# Patient Record
Sex: FEMALE | Race: WHITE | NOT HISPANIC OR LATINO | Employment: PART TIME | URBAN - METROPOLITAN AREA
[De-identification: names, ages, dates, MRNs, and addresses within clinical notes are randomized per-mention and may not be internally consistent; named-entity substitution may affect disease eponyms.]

---

## 2017-02-21 ENCOUNTER — TELEPHONE (OUTPATIENT)
Dept: FAMILY MEDICINE | Facility: CLINIC | Age: 62
End: 2017-02-21

## 2017-02-21 NOTE — TELEPHONE ENCOUNTER
Panel Management Review      Patient has the following on her problem list:     Depression / Dysthymia review  PHQ-9 SCORE 5/11/2015 1/15/2016 10/13/2016   Total Score 1 - -   Total Score - 18 2      Patient is due for:  None    Hypertension   Last three blood pressure readings:  BP Readings from Last 3 Encounters:   11/10/16 134/86   06/10/16 101/47   05/23/16 126/86     Blood pressure: Passed    HTN Guidelines:  Age 18-59 BP range:  Less than 140/90  Age 60-85 with Diabetes:  Less than 140/90  Age 60-85 without Diabetes:  less than 150/90      Composite cancer screening  Chart review shows that this patient is due/due soon for the following Fecal Colorectal (FIT)  Summary:    Patient is due/failing the following:   DAP and FIT    Action needed:   Patient needs to do the colon cancer screening(FIT Test). Looks like she did the FIT test last year.  So if she does not have the FIT Test she can pick one up at the lab.     Type of outreach:    Phone, left message for patient to call back.     Questions for provider review:    None                                                                                                                                    Lisa Magill, CMA       Chart routed to Care Team .

## 2017-02-24 NOTE — TELEPHONE ENCOUNTER
Patient has moved, she no longer in this area. She will follow up with new doctor in her area. Sherley Luna

## 2017-04-18 DIAGNOSIS — F41.9 ANXIETY: ICD-10-CM

## 2017-04-18 RX ORDER — BUPROPION HYDROCHLORIDE 150 MG/1
TABLET ORAL
Qty: 30 TABLET | Refills: 0 | Status: SHIPPED | OUTPATIENT
Start: 2017-04-18 | End: 2017-05-20

## 2017-04-18 NOTE — TELEPHONE ENCOUNTER
Bupropion       Last Written Prescription Date: 11/10/16  Last Fill Quantity: 90; # refills: 1  Last Office Visit with [facility], [facility] or [facility] prescribing provider:  11/10/16        Last PHQ-9 score on record=   PHQ-9 SCORE 10/13/2016   Total Score -   Total Score 2       Lab Results   Component Value Date    AST 16 05/23/2016     Lab Results   Component Value Date    ALT 21 05/23/2016

## 2017-04-18 NOTE — TELEPHONE ENCOUNTER
buPROPion (WELLBUTRIN XL) 150 MG 24 hr tablet       Last Written Prescription Date: 11/10/16  Last Fill Quantity: 90; # refills: 1  Last Office Visit with [facility], [facility] or [facility] prescribing provider:  11/10/16        Last PHQ-9 score on record=   PHQ-9 SCORE 10/13/2016   Total Score -   Total Score 2       Lab Results   Component Value Date    AST 16 05/23/2016     Lab Results   Component Value Date    ALT 21 05/23/2016

## 2017-04-18 NOTE — TELEPHONE ENCOUNTER
Prescription approved per [facility] Refill Protocol.    Margie FRAGA RN

## 2017-04-21 ENCOUNTER — OFFICE VISIT (OUTPATIENT)
Dept: FAMILY MEDICINE | Facility: CLINIC | Age: 62
End: 2017-04-21
Payer: COMMERCIAL

## 2017-04-21 VITALS
WEIGHT: 264 LBS | BODY MASS INDEX: 40.01 KG/M2 | TEMPERATURE: 98.2 F | DIASTOLIC BLOOD PRESSURE: 74 MMHG | RESPIRATION RATE: 20 BRPM | HEIGHT: 68 IN | HEART RATE: 76 BPM | SYSTOLIC BLOOD PRESSURE: 128 MMHG

## 2017-04-21 DIAGNOSIS — M81.0 OSTEOPOROSIS: ICD-10-CM

## 2017-04-21 DIAGNOSIS — Z13.29 SCREENING FOR HYPOTHYROIDISM: ICD-10-CM

## 2017-04-21 DIAGNOSIS — E66.01 MORBID OBESITY DUE TO EXCESS CALORIES (H): ICD-10-CM

## 2017-04-21 DIAGNOSIS — Z13.220 SCREENING FOR HYPERLIPIDEMIA: ICD-10-CM

## 2017-04-21 DIAGNOSIS — F32.0 MILD SINGLE CURRENT EPISODE OF MAJOR DEPRESSIVE DISORDER (H): ICD-10-CM

## 2017-04-21 DIAGNOSIS — Z12.11 COLON CANCER SCREENING: ICD-10-CM

## 2017-04-21 DIAGNOSIS — I10 BENIGN ESSENTIAL HYPERTENSION: ICD-10-CM

## 2017-04-21 DIAGNOSIS — Z00.00 ENCOUNTER FOR HEALTH MAINTENANCE EXAMINATION IN ADULT: Primary | ICD-10-CM

## 2017-04-21 DIAGNOSIS — Z96.653 STATUS POST TOTAL BILATERAL KNEE REPLACEMENT: ICD-10-CM

## 2017-04-21 LAB
ALBUMIN SERPL-MCNC: 3.8 G/DL (ref 3.4–5)
ALP SERPL-CCNC: 114 U/L (ref 40–150)
ALT SERPL W P-5'-P-CCNC: 16 U/L (ref 0–50)
ANION GAP SERPL CALCULATED.3IONS-SCNC: 9 MMOL/L (ref 3–14)
AST SERPL W P-5'-P-CCNC: 14 U/L (ref 0–45)
BASOPHILS # BLD AUTO: 0 10E9/L (ref 0–0.2)
BASOPHILS NFR BLD AUTO: 0.3 %
BILIRUB SERPL-MCNC: 0.7 MG/DL (ref 0.2–1.3)
BUN SERPL-MCNC: 17 MG/DL (ref 7–30)
CALCIUM SERPL-MCNC: 8.9 MG/DL (ref 8.5–10.1)
CHLORIDE SERPL-SCNC: 105 MMOL/L (ref 94–109)
CHOLEST SERPL-MCNC: 173 MG/DL
CO2 SERPL-SCNC: 25 MMOL/L (ref 20–32)
CREAT SERPL-MCNC: 0.81 MG/DL (ref 0.52–1.04)
DIFFERENTIAL METHOD BLD: NORMAL
EOSINOPHIL # BLD AUTO: 0.1 10E9/L (ref 0–0.7)
EOSINOPHIL NFR BLD AUTO: 1.4 %
ERYTHROCYTE [DISTWIDTH] IN BLOOD BY AUTOMATED COUNT: 14.1 % (ref 10–15)
GFR SERPL CREATININE-BSD FRML MDRD: 72 ML/MIN/1.7M2
GLUCOSE SERPL-MCNC: 81 MG/DL (ref 70–99)
HCT VFR BLD AUTO: 46 % (ref 35–47)
HDLC SERPL-MCNC: 73 MG/DL
HGB BLD-MCNC: 15.1 G/DL (ref 11.7–15.7)
LDLC SERPL CALC-MCNC: 85 MG/DL
LYMPHOCYTES # BLD AUTO: 1.6 10E9/L (ref 0.8–5.3)
LYMPHOCYTES NFR BLD AUTO: 26.3 %
MCH RBC QN AUTO: 29.4 PG (ref 26.5–33)
MCHC RBC AUTO-ENTMCNC: 32.8 G/DL (ref 31.5–36.5)
MCV RBC AUTO: 90 FL (ref 78–100)
MONOCYTES # BLD AUTO: 0.6 10E9/L (ref 0–1.3)
MONOCYTES NFR BLD AUTO: 9.1 %
NEUTROPHILS # BLD AUTO: 3.9 10E9/L (ref 1.6–8.3)
NEUTROPHILS NFR BLD AUTO: 62.9 %
NONHDLC SERPL-MCNC: 100 MG/DL
PLATELET # BLD AUTO: 212 10E9/L (ref 150–450)
POTASSIUM SERPL-SCNC: 4.2 MMOL/L (ref 3.4–5.3)
PROT SERPL-MCNC: 6.9 G/DL (ref 6.8–8.8)
RBC # BLD AUTO: 5.14 10E12/L (ref 3.8–5.2)
SODIUM SERPL-SCNC: 139 MMOL/L (ref 133–144)
TRIGL SERPL-MCNC: 76 MG/DL
TSH SERPL DL<=0.005 MIU/L-ACNC: 0.74 MU/L (ref 0.4–4)
WBC # BLD AUTO: 6.2 10E9/L (ref 4–11)

## 2017-04-21 PROCEDURE — 84443 ASSAY THYROID STIM HORMONE: CPT | Performed by: NURSE PRACTITIONER

## 2017-04-21 PROCEDURE — 80061 LIPID PANEL: CPT | Performed by: NURSE PRACTITIONER

## 2017-04-21 PROCEDURE — 85025 COMPLETE CBC W/AUTO DIFF WBC: CPT | Performed by: NURSE PRACTITIONER

## 2017-04-21 PROCEDURE — 80053 COMPREHEN METABOLIC PANEL: CPT | Performed by: NURSE PRACTITIONER

## 2017-04-21 PROCEDURE — 36415 COLL VENOUS BLD VENIPUNCTURE: CPT | Performed by: NURSE PRACTITIONER

## 2017-04-21 PROCEDURE — 99396 PREV VISIT EST AGE 40-64: CPT | Performed by: NURSE PRACTITIONER

## 2017-04-21 ASSESSMENT — ANXIETY QUESTIONNAIRES
7. FEELING AFRAID AS IF SOMETHING AWFUL MIGHT HAPPEN: NOT AT ALL
3. WORRYING TOO MUCH ABOUT DIFFERENT THINGS: SEVERAL DAYS
GAD7 TOTAL SCORE: 4
1. FEELING NERVOUS, ANXIOUS, OR ON EDGE: SEVERAL DAYS
2. NOT BEING ABLE TO STOP OR CONTROL WORRYING: SEVERAL DAYS
5. BEING SO RESTLESS THAT IT IS HARD TO SIT STILL: NOT AT ALL
6. BECOMING EASILY ANNOYED OR IRRITABLE: NOT AT ALL

## 2017-04-21 ASSESSMENT — PATIENT HEALTH QUESTIONNAIRE - PHQ9: 5. POOR APPETITE OR OVEREATING: SEVERAL DAYS

## 2017-04-21 NOTE — LETTER
My Depression Action Plan  Name: Donita Lerner   Date of Birth 1955  Date: 4/21/2017    My doctor: Daya Bruno   My clinic: [facility]  [address] [facility] [address]  [phone]          GREEN    ZONE   Good Control    What it looks like:     Things are going generally well. You have normal up s and down s. You may even feel depressed from time to time, but bad moods usually last less than a day.   What you need to do:  1. Continue to care for yourself (see self care plan)  2. Check your depression survival kit and update it as needed  3. Follow your physician s recommendations including any medication.  4. Do not stop taking medication unless you consult with your physician first.           YELLOW         ZONE Getting Worse    What it looks like:     Depression is starting to interfere with your life.     It may be hard to get out of bed; you may be starting to isolate yourself from others.    Symptoms of depression are starting to last most all day and this has happened for several days.     You may have suicidal thoughts but they are not constant.   What you need to do:     1. Call your care team, your response to treatment will improve if you keep your care team informed of your progress. Yellow periods are signs an adjustment may need to be made.     2. Continue your self-care, even if you have to fake it!    3. Talk to someone in your support network    4. Open up your depression survival kit           RED    ZONE Medical Alert - Get Help    What it looks like:     Depression is seriously interfering with your life.     You may experience these or other symptoms: You can t get out of bed most days, can t work or engage in other necessary activities, you have trouble taking care of basic hygiene, or basic responsibilities, thoughts of suicide or death that will not go away, self-injurious behavior.     What you need to do:  1. Call your care team and  request a same-day appointment. If they are not available (weekends or after hours) call your local crisis line, emergency room or 911.      Electronically signed by: Carrie Jones, April 21, 2017    Depression Self Care Plan / Survival Kit    Self-Care for Depression  Here s the deal. Your body and mind are really not as separate as most people think.  What you do and think affects how you feel and how you feel influences what you do and think. This means if you do things that people who feel good do, it will help you feel better.  Sometimes this is all it takes.  There is also a place for medication and therapy depending on how severe your depression is, so be sure to consult with your medical provider and/ or Behavioral Health Consultant if your symptoms are worsening or not improving.     In order to better manage my stress, I will:    Exercise  Get some form of exercise, every day. This will help reduce pain and release endorphins, the  feel good  chemicals in your brain. This is almost as good as taking antidepressants!  This is not the same as joining a gym and then never going! (they count on that by the way ) It can be as simple as just going for a walk or doing some gardening, anything that will get you moving.      Hygiene   Maintain good hygiene (Get out of bed in the morning, Make your bed, Brush your teeth, Take a shower, and Get dressed like you were going to work, even if you are unemployed).  If your clothes don't fit try to get ones that do.    Diet  I will strive to eat foods that are good for me, drink plenty of water, and avoid excessive sugar, caffeine, alcohol, and other mood-altering substances.  Some foods that are helpful in depression are: complex carbohydrates, B vitamins, flaxseed, fish or fish oil, fresh fruits and vegetables.    Psychotherapy  I agree to participate in Individual Therapy (if recommended).    Medication  If prescribed medications, I agree to take them.  Missing doses  can result in serious side effects.  I understand that drinking alcohol, or other illicit drug use, may cause potential side effects.  I will not stop my medication abruptly without first discussing it with my provider.    Staying Connected With Others  I will stay in touch with my friends, family members, and my primary care provider/team.    Use your imagination  Be creative.  We all have a creative side; it doesn t matter if it s oil painting, sand castles, or mud pies! This will also kick up the endorphins.    Witness Beauty  (AKA stop and smell the roses) Take a look outside, even in mid-winter. Notice colors, textures. Watch the squirrels and birds.     Service to others  Be of service to others.  There is always someone else in need.  By helping others we can  get out of ourselves  and remember the really important things.  This also provides opportunities for practicing all the other parts of the program.    Humor  Laugh and be silly!  Adjust your TV habits for less news and crime-drama and more comedy.    Control your stress  Try breathing deep, massage therapy, biofeedback, and meditation. Find time to relax each day.     My support system    Clinic Contact:  Phone number:    Contact 1:  Phone number:    Contact 2:  Phone number:    [employer]/:  Phone number:    Therapist:  Phone number:    Local crisis center:    Phone number:    Other community support:  Phone number:

## 2017-04-21 NOTE — MR AVS SNAPSHOT
After Visit Summary   4/21/2017    Donita Lerner    MRN: [id number]           Patient Information     Date Of Birth          1955        Visit Information        Provider Department      4/21/2017 8:00 AM Ami Augustine APRN [facility]        Today's Diagnoses     Encounter for health maintenance examination in adult    -  1    Colon cancer screening        Benign essential hypertension        Morbid obesity due to excess calories (H)        Status post total bilateral knee replacement        Osteoporosis        Mild single current episode of major depressive disorder (H)        Screening for hyperlipidemia        Screening for hypothyroidism          Care Instructions    We will call you with the results of your labs     Continue to work on weight loss    Schedule both your colonoscopy and bone scan (DEXA scan). You are due for both.    Return in a year please.        Preventive Health Recommendations  Female Ages 50 - 64    Yearly exam: See your health care provider every year in order to  o Review health changes.   o Discuss preventive care.    o Review your medicines if your doctor has prescribed any.      Get a Pap test every three years (unless you have an abnormal result and your provider advises testing more often).    If you get Pap tests with HPV test, you only need to test every 5 years, unless you have an abnormal result.     You do not need a Pap test if your uterus was removed (hysterectomy) and you have not had cancer.    You should be tested each year for STDs (sexually transmitted diseases) if you're at risk.     Have a mammogram every 1 to 2 years.    Have a colonoscopy at age 50, or have a yearly FIT test (stool test). These exams screen for colon cancer.      Have a cholesterol test every 5 years, or more often if advised.    Have a diabetes test (fasting glucose) every three years. If you are at risk for diabetes, you should have this test more  often.     If you are at risk for osteoporosis (brittle bone disease), think about having a bone density scan (DEXA).    Shots: Get a flu shot each year. Get a tetanus shot every 10 years.    Nutrition:     Eat at least 5 servings of fruits and vegetables each day.    Eat whole-grain bread, whole-wheat pasta and brown rice instead of white grains and rice.    Talk to your provider about Calcium and Vitamin D.     Lifestyle    Exercise at least 150 minutes a week (30 minutes a day, 5 days a week). This will help you control your weight and prevent disease.    Limit alcohol to one drink per day.    No smoking.     Wear sunscreen to prevent skin cancer.     See your dentist every six months for an exam and cleaning.    See your eye doctor every 1 to 2 years.    Colorectal Cancer Screening  Colorectal cancer (cancer in the colon or rectum) is a leading cause of cancer deaths in the [place]. But it doesn t have to be. When this cancer is found and removed early, the chances of a full recovery are very good. Because colorectal cancer rarely causes symptoms in its early stages, screening for the disease is important. It s even more crucial if you have risk factors for the disease. Learn more about colorectal cancer and its risk factors. Then talk to your healthcare provider about being screened. You could be saving your own life.  Risk factors for colorectal cancer  Your risk of having colorectal cancer increases if you:    Are 50 years of age or older    Have a family history or personal history of colorectal cancer or polyps    Have a personal history of type 2 diabetes, Crohn s disease, or ulcerative colitis    Have an inherited genetic syndrome like Mac syndrome (also known as HNPCC) or familial adenomatous polyposis (FAP)    Are very overweight    Are not physically active    Smoke    Drink a lot of alcohol    Eat a lot of red or processed meat  The colon and rectum        A SANDHYA can detect a growth in the  rectum or anus.      Waste from food you eat enters the colon from the small intestine. As it travels through the colon, the waste (stool) loses water and becomes more solid. Intestinal muscles push it toward the sigmoid--the last section of the colon. Stool then moves into the rectum, where it s stored until it s ready to leave the body during a bowel movement.  How cancer develops  Polyps are growths that form on the inner lining of the colon or rectum. Most are benign, which means they aren t cancerous. But over time, some polyps can become malignant (cancerous). This happens when cells in these polyps begin growing abnormally. In time, malignant cells invade more and more of the colon and rectum. The cancer may also spread to nearby organs or lymph nodes or to other parts of the body. Finding and removing polyps can help prevent cancer from ever forming.  Your screening  Screening means looking for a medical problem before you have symptoms. During screening for colorectal cancer, your healthcare provider will ask about your medical history, examine you, and do one or more tests.  History and exam  The history and exam involve the following:    Medical history. Your healthcare provider will ask about your medical history. Mention if a family member has had colon cancer or polyps. Also mention any health problems you have had in the past.    Digital rectal exam (SANDHYA). During a SANDHYA, the healthcare provider inserts a lubricated gloved finger into the rectum. The test is painless and takes less than a minute. Healthcare providers agree that this test alone is not enough to screen for colorectal cancer.  Screening test choices  Fecal occult blood test (FOBT) or fecal immunochemical test (FIT)  These tests check for occult blood in stool (blood you can t see). Hidden blood may be a sign of colon polyps or cancer. A small sample of stool is tested for blood in a laboratory. Most often, you collect this sample at home  using a kit your healthcare provider gives you. Follow the instructions carefully for using this kit. You might need to avoid certain foods and medicines before the test, as directed.  Barium enema with contrast (double-contrast barium enema)  This test uses X-rays to provide images of the entire colon and rectum. The day before this test, you will need to do a bowel prep to clean out the colon and rectum. A bowel prep is a liquid diet plus strong laxatives or enemas. You will be awake for the test, but you may be given medicine to help you relax. At the start of the test, a radiologist (a healthcare provider who specializes in imaging tests) places a soft tube into the rectum. The tube is used to fill the colon with a contrast liquid (barium) and air. This can be uncomfortable for some people. The liquid helps the colon show up clearly on the X-rays. Because the test uses X-rays, it exposes you to a small amount of radiation.  Virtual colonoscopy  This exam is also called a CT colonography. It uses a series of X-ray photographs to create a 3-D view of the colon and rectum. The day before the test, you will need to do a bowel prep to clean out your colon. Your healthcare provider will give you instructions on how to do this. During the procedure, you will lie on a table that is part of a special X-ray machine called a CT scanner. A small tube will be placed into your rectum to fill the colon and rectum with air. This can be uncomfortable for some people. Then, the table will move into the machine and pictures will be taken of your colon and rectum. A computer will combine these photos to create a 3-D picture. Because the test uses X-rays, it exposes you to a small amount of radiation.        Scope exams  Here are two types of scope exams:    Colonoscopy. This test can be used to find and remove polyps anywhere in the colon or rectum. The day before the test, you will do a bowel prep. This is a liquid diet plus a  strong laxative solution or an enema. The bowel prep will cleanse your colon. You will be given instructions for this. Just before the test, you are given a medicine to make you sleepy. Then, a long, flexible, lighted tube called a colonoscope is gently inserted into the rectum and guided through the entire colon. Images of the colon are viewed on a video screen. Any polyps that are found are removed and sent to a lab for testing. If a polyp can t be removed, a sample of tissue is taken and the polyp might be removed later during surgery. You will need to bring someone with you to drive you home after this test.     Sigmoidoscopy. This test is similar to colonoscopy, but focuses only on the sigmoid colon and rectum. As with colonoscopy, bowel prep must be done the day before this test. It might not need to be as complete as the bowel prep for a colonoscopy. You are awake during the procedure, but you may be given medicine to help you relax. During the test, the healthcare provider guides a thin, flexible, lighted tube called a sigmoidoscope through your rectum and lower colon. The images are displayed on a video screen. Polyps are removed, if possible, and sent to a lab for testing.  Colonoscopy is the only screening test that lets your healthcare provider see the entire colon and rectum. This test also lets your healthcare provider remove any pieces of tissue that need to be looked at by a lab. If something suspicious is found using any other tests, you will likely need a colonoscopy.  When to call your healthcare provider after a test  Call your healthcare provider if you have any of the following after any screening test:    Bleeding    Fever over 101 F (38.8 C)    Abdominal pain    Vomiting     9798-6554 [employer] [address] [place] [address], [place] [address]. All rights reserved. This information is not intended as a substitute for professional medical care. Always follow your healthcare  professional's instructions.        Weight Management: Healthy Eating  Food is your body s fuel. You can t live without it. The key is to give your body enough nutrients and energy without eating too much. Reading food labels can help you make healthy choices. Also, learn new eating habits to manage your weight.     All the values on the label are based on one serving. The serving size is the average portion. Remember to multiply the values on the label by the number of servings you eat.   Eat less fat  A gram of fat has almost 2.5 times the calories of a gram of protein or carbohydrates. Try to balance your food choices so that only 20% to 35% of your calories comes from total fat. This means an average of 2  to 3  grams of fat for each 100 calories you eat.  Eat more fiber  High-fiber foods are digested more slowly than low-fiber foods, so you feel full longer. Try to get 31 grams of fiber each day. Foods high in fiber include:    Vegetables and fruits    Whole-grain or bran breads, pastas, and cereals    Legumes (beans) and peas  As you begin to eat more fiber, be sure to drink plenty of water to keep your digestive system working smoothly.  Tips    Don t skip meals. This often leads to overeating later on. It s best to spread your eating throughout the day.    Eat a variety of foods, not just a few favorites.    If you find yourself eating when you re not hungry, ask yourself why. Many of us eat when we re bored, stressed, or just to be polite. Listen to your body. If you re not hungry, get busy doing something else instead of eating.    Eat slower, shooting for 20 to 30 minutes for each meal. It takes 20 minutes for your stomach to tell your brain that it s full.    Pay attention to what you eat. Don t read or watch TV during your meal.    8521-9535 [employer] [address] [place] [address], [place] [address]. All rights reserved. This information is not intended as a substitute for professional medical  care. Always follow your healthcare professional's instructions.        Weight Management: Overcoming Your Barriers  You may have many reasons why you re not ready to lose weight. You may not feel you have the time or the skills. You may be afraid of losing weight and gaining it back again. Well, you can lose weight. And you can keep the weight off, if you make changes slowly and stick with them. Remember that you may never find the perfect time to lose weight. Decide that the right time to be healthier is now.    Common barriers  Barrier 1: I don t want to deny myself.  Barrier Buster: You don t have to! Moderation is the key.    Watch portion sizes and know when you're eating more than one serving.    Plan to ask for a doggy bag when you eat out.    Have just one.    Choose lower-fat and lower-calorie versions of your favorites.  Barrier 2: I lost weight before but I gained it right back.  Barrier Buster: Make this time different.    List what worked and didn t work last time and what you can try this time.    Choose changes that you are willing to stick with.    Work exercise into your weight-loss plan.    Be realistic about what is possible.   Barrier 3: I don t have the time to be active.  Barrier Buster: It takes just a few minutes a day!    Be active with a pet or the kids.    Block off activity time in your schedule.    Borrow some time that you usually spend watching TV.    You are too important not to take time to exercise -- it is your life!   Feel good about yourself  Do you eat more because you feel bad about yourself, then feel even worse as you gain weight? This is a  vicious cycle.  Breaking this cycle is not easy. You may need group support or counseling. Always remember that you are a valuable person, no matter what size or shape you are.  Do you have a health problem? If so, don t use it as an excuse for not losing weight. Ask your doctor, dietitian, or other health care provider about methods to  lose weight that are safe for you. For example, even if you have severe arthritis, you can walk in a pool. Get advice from a .      6242-4456 [employer]. [address] [place] [address], [place] [address]. All rights reserved. This information is not intended as a substitute for professional medical care. Always follow your healthcare professional's instructions.              Follow-ups after your visit        Additional Services     GASTROENTEROLOGY ADULT REF PROCEDURE ONLY       Last Lab Result: Creatinine (mg/dL)       Date                     Value                 06/08/2016               0.59             ----------  Body mass index is 40.14 kg/(m^2).     Needed:  No  Language:  English    Patient will be contacted to schedule procedure.     Please be aware that coverage of these services is subject to the terms and limitations of your health insurance plan.  Call member services at your health plan with any benefit or coverage questions.  Any procedures must be performed at a [facility] facility OR coordinated by your clinic's referral office.    Please bring the following with you to your appointment:    (1) Any X-Rays, CTs or MRIs which have been performed.  Contact the facility where they were done to arrange for  prior to your scheduled appointment.    (2) List of current medications   (3) This referral request   (4) Any documents/labs given to you for this referral                  Future tests that were ordered for you today     Open Future Orders        Priority Expected Expires Ordered    Fecal colorectal cancer screen (FIT) Routine 5/12/2017 7/14/2017 4/21/2017            Who to contact     If you have questions or need follow up information about today's clinic visit or your schedule please contact [facility] directly at [phone].  Normal or non-critical lab and imaging results will be communicated to you by MyChart, letter or phone within  "4 business days after the clinic has received the results. If you do not hear from us within 7 days, please contact the clinic through [facility] or phone. If you have a critical or abnormal lab result, we will notify you by phone as soon as possible.  Submit refill requests through [facility] or call your pharmacy and they will forward the refill request to us. Please allow 3 business days for your refill to be completed.          Additional Information About Your Visit        [facility] Information     [facility] gives you secure access to your electronic health record. If you see a primary care provider, you can also send messages to your care team and make appointments. If you have questions, please call your primary care clinic.  If you do not have a primary care provider, please call [phone] and they will assist you.        Care EveryWhere ID     This is your Care EveryWhere ID. This could be used by other organizations to access your [facility] medical records  [id number]        Your Vitals Were     Pulse Temperature Respirations Height Last Period BMI (Body Mass Index)    76 98.2  F (36.8  C) (Tympanic) 20 5' 8\" (1.727 m) 07/01/2002 40.14 kg/m2       Blood Pressure from Last 3 Encounters:   04/21/17 128/74   11/10/16 134/86   06/10/16 101/47    Weight from Last 3 Encounters:   04/21/17 264 lb (119.7 kg)   11/10/16 267 lb (121.1 kg)   06/08/16 257 lb 9.6 oz (116.8 kg)              We Performed the Following     CBC with platelets differential     Comprehensive metabolic panel     DEPRESSION ACTION PLAN (DAP)     GASTROENTEROLOGY ADULT REF PROCEDURE ONLY     Lipid Profile with reflex to direct LDL     TSH with free T4 reflex          Today's Medication Changes          These changes are accurate as of: 4/21/17  9:08 AM.  If you have any questions, ask your nurse or doctor.               These medicines have changed or have updated prescriptions.        Dose/Directions    vitamin D 2000 UNITS tablet   This may have " changed:  when to take this   Used for:  Routine general medical examination at a health care facility        Dose:  2000 Units   Take 2,000 Units by mouth daily   Quantity:  100 tablet   Refills:  3                Primary Care Provider Office Phone # Fax #    Daya Bruno [phone]       [facility] [address]   [facility] [address]        Thank you!     Thank you for choosing [facility] [place]  for your care. Our goal is always to provide you with excellent care. Hearing back from our patients is one way we can continue to improve our services. Please take a few minutes to complete the written survey that you may receive in the mail after your visit with us. Thank you!             Your Updated Medication List - Protect others around you: Learn how to safely use, store and throw away your medicines at www.disposemymeds.org.          This list is accurate as of: 4/21/17  9:08 AM.  Always use your most recent med list.                   Brand Name Dispense Instructions for use    amLODIPine 5 MG tablet    NORVASC    90 tablet    Take 1 tablet (5 mg) by mouth daily       aspirin 81 MG tablet     90 tablet    Take 1 tablet (81 mg) by mouth daily       buPROPion 150 MG 24 hr tablet    WELLBUTRIN XL    30 tablet    TAKE ONE TABLET BY MOUTH ONCE DAILY IN THE MORNING       escitalopram 20 MG tablet    LEXAPRO    90 tablet    Take 1 tablet (20 mg) by mouth daily       metoprolol 50 MG 24 hr tablet    TOPROL-XL    90 tablet    Take 1 tablet (50 mg) by mouth daily       vitamin D 2000 UNITS tablet     100 tablet    Take 2,000 Units by mouth daily

## 2017-04-21 NOTE — NURSING NOTE
"Chief Complaint   Patient presents with     Physical       Initial /74  Pulse 76  Temp 98.2  F (36.8  C) (Tympanic)  Resp 20  Ht 5' 8\" (1.727 m)  Wt 264 lb (119.7 kg)  LMP 07/01/2002  BMI 40.14 kg/m2 Estimated body mass index is 40.14 kg/(m^2) as calculated from the following:    Height as of this encounter: 5' 8\" (1.727 m).    Weight as of this encounter: 264 lb (119.7 kg).  Medication Reconciliation: complete  "

## 2017-04-21 NOTE — PROGRESS NOTES
SUBJECTIVE:     CC: Donita Lerner is an 61 year old woman who presents for preventive health visit.     Healthy Habits:    Do you get at least three servings of calcium containing foods daily (dairy, green leafy vegetables, etc.)? yes    Amount of exercise or daily activities, outside of work: minimal      Problems taking medications regularly No    Medication side effects: No    Have you had an eye exam in the past two years? yes    Do you see a dentist twice per year? yes  Do you have sleep apnea, excessive snoring or daytime drowsiness?no      HTN  BP Readings from Last 6 Encounters:   04/21/17 128/74   11/10/16 134/86   06/10/16 101/47   05/23/16 126/86   03/28/16 (!) 148/98   03/08/16 (!) 158/106     Depression:  Doing well on the Wellbutrin  Does not see any need to change medication or dose      Status post total bilateral knee replacement  Left partial knee 2016  Right knee on 2013  No current issues.    Morbid obesity  Wt Readings from Last 4 Encounters:   04/21/17 264 lb (119.7 kg)   11/10/16 267 lb (121.1 kg)   06/08/16 257 lb 9.6 oz (116.8 kg)   05/23/16 257 lb 9.6 oz (116.8 kg)   discussed availability of dietitian to review diet with patient.  She refuses at this time, stating that she knows what she needs to do.  Options available through [facility] were discussed including bariatric clinic down at the [facility].    Status post CVA  Had Vertigo In the past.. MRI done in 2005 found previous CVA on right, some left side weakness    4/20/2005:    MRI BRAIN WITHOUT AND WITH INTRAVENOUS CONTRAST:    HISTORY: Chronic left-sided deafness with recent onset of  dizziness and left endolymphatic hydrops.   Evaluate for  retrocochlear lesion.   IMPRESSION:  1.  Volume loss related to the right sylvian fissure region with  a small ribbon of underlying abnormal increased T2 signal in this  area.   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    2.   No other significant intracranial abnormalities.   In  particular, no posterior fossa lesions are identified.    3.   Small right maxillary sinus showing mild mucosal thickening.    Antonio Cho M.D./dickson    Health maintenance screening:  Screening for hyperlipidemia  Screening for hypothyroidism  Colon cancer screening      Today's PHQ-2 Score:   PHQ-2 ( 1999 [employer]) 5/23/2016 3/28/2016   Q1: Little interest or pleasure in doing things 0 0   Q2: Feeling down, depressed or hopeless 0 0   PHQ-2 Score 0 0       Abuse: Current or Past(Physical, Sexual or Emotional)- No  Do you feel safe in your environment - Yes    Social History   Substance Use Topics     Smoking status: Never Smoker     Smokeless tobacco: Never Used     Alcohol use No     The patient does not drink >3 drinks per day nor >7 drinks per week.    Recent Labs   Lab Test  03/28/16   1109  03/23/15   1117   CHOL  189  163   HDL  69  64   LDL  105*  87   TRIG  73  60   CHOLHDLRATIO   --   2.5   NHDL  120   --        Reviewed orders with patient.  Reviewed health maintenance and updated orders accordingly - Yes    Mammo Decision Support:  Patient over age 50, mutual decision to screen reflected in health maintenance.    Pertinent mammograms are reviewed under the imaging tab.  History of abnormal Pap smear: NO - age 30- 65 PAP every 3 years recommended    Reviewed and updated as needed this visit by clinical staff  Tobacco  Allergies  Med Hx  Surg Hx  Fam Hx  Soc Hx        Reviewed and updated as needed this visit by Provider            ROS:  C: NEGATIVE for fever, chills, change in weight  I: NEGATIVE for worrisome rashes, moles or lesions  E: NEGATIVE for vision changes or irritation  ENT: NEGATIVE for ear, mouth and throat problems  R: NEGATIVE for significant cough or SOB  B: NEGATIVE for masses, tenderness or discharge  CV: NEGATIVE for chest pain, palpitations or peripheral edema  GI: NEGATIVE for nausea, abdominal pain, heartburn, or  "change in bowel habits  : NEGATIVE for unusual urinary or vaginal symptoms. No vaginal bleeding.  M: NEGATIVE for significant arthralgias or myalgia  N: NEGATIVE for weakness, dizziness or paresthesias  P: NEGATIVE for changes in mood or affect     Problem list, Medication list, Allergies, and Medical/Social/Surgical histories reviewed in [facility] and updated as appropriate.  OBJECTIVE:     /74  Pulse 76  Temp 98.2  F (36.8  C) (Tympanic)  Resp 20  Ht 5' 8\" (1.727 m)  Wt 264 lb (119.7 kg)  LMP 07/01/2002  BMI 40.14 kg/m2  EXAM:  GENERAL: healthy, alert, no distress and obese  EYES: Eyes grossly normal to inspection and conjunctivae and sclerae normal  HENT: ear canals and TM's normal, nose and mouth without ulcers or lesions  NECK: no adenopathy, no asymmetry, masses, or scars and thyroid normal to palpation  RESP: lungs clear to auscultation - no rales, rhonchi or wheezes  BREAST: normal without masses, tenderness or nipple discharge and no palpable axillary masses or adenopathy  CV: regular rate and rhythm, normal S1 S2, no S3 or S4, no murmur, click or rub, no peripheral edema and peripheral pulses strong  ABDOMEN: unable to hear bowel sounds due to abdominal girth  MS: no gross musculoskeletal defects noted, no edema  SKIN: no suspicious lesions or rashes  NEURO: Normal strength and tone, mentation intact and speech normal  PSYCH: mentation appears normal, affect normal/bright    ASSESSMENT/PLAN:     1. Encounter for health maintenance examination in adult  - CBC with platelets differential      2. Morbid obesity due to excess calories (H)  Discussed options available to assist with weight loss.  She is not interested at this time. Encouraged patient to give more thought to receiving support.      3. Benign essential hypertension  Blood pressure now is in desired range on metoprolol and amlodipine. No change in plan of care  - Comprehensive metabolic panel      4. Status post total bilateral knee " "replacement  Denies knee pain.  Stable    5. Mild single current episode of major depressive disorder (H)  Well-managed on bupropion and escitalopram. No change in plan of care    6. Colon cancer screening  - Fecal colorectal cancer screen (FIT); Future  - GASTROENTEROLOGY ADULT REF PROCEDURE ONLY    7. Screening for hyperlipidemia  - Lipid Profile with reflex to direct LDL    8. Screening for hypothyroidism  - TSH with free T4 reflex      COUNSELING:   Reviewed preventive health counseling, as reflected in patient instructions  Special attention given to:        Regular exercise       Healthy diet/nutrition       Colon cancer screening    BP Screening:   Last 3 BP Readings:    BP Readings from Last 3 Encounters:   04/21/17 128/74   11/10/16 134/86   06/10/16 101/47       The following was recommended to the patient:  Re-screen BP within a year and recommended lifestyle modifications     reports that she has never smoked. She has never used smokeless tobacco.    Estimated body mass index is 40.14 kg/(m^2) as calculated from the following:    Height as of this encounter: 5' 8\" (1.727 m).    Weight as of this encounter: 264 lb (119.7 kg).   Weight management plan: Discussed healthy diet and exercise guidelines and patient will follow up in 12 months in clinic to re-evaluate.    Counseling Resources:  ATP IV Guidelines  Pooled Cohorts Equation Calculator  Breast Cancer Risk Calculator  FRAX Risk Assessment  ICSI Preventive Guidelines  Dietary Guidelines for Americans, 2010  USDA's MyPlate  ASA Prophylaxis  Lung CA Screening  ASSESSMENT:      Patient Instructions     We will call you with the results of your labs     Continue to work on weight loss    Schedule both your colonoscopy and bone scan (DEXA scan). You are due for both.    Return in a year please.        Preventive Health Recommendations  Female Ages 50 - 64    Yearly exam: See your health care provider every year in order to  o Review health changes. "   o Discuss preventive care.    o Review your medicines if your doctor has prescribed any.      Get a Pap test every three years (unless you have an abnormal result and your provider advises testing more often).    If you get Pap tests with HPV test, you only need to test every 5 years, unless you have an abnormal result.     You do not need a Pap test if your uterus was removed (hysterectomy) and you have not had cancer.    You should be tested each year for STDs (sexually transmitted diseases) if you're at risk.     Have a mammogram every 1 to 2 years.    Have a colonoscopy at age 50, or have a yearly FIT test (stool test). These exams screen for colon cancer.      Have a cholesterol test every 5 years, or more often if advised.    Have a diabetes test (fasting glucose) every three years. If you are at risk for diabetes, you should have this test more often.     If you are at risk for osteoporosis (brittle bone disease), think about having a bone density scan (DEXA).    Shots: Get a flu shot each year. Get a tetanus shot every 10 years.    Nutrition:     Eat at least 5 servings of fruits and vegetables each day.    Eat whole-grain bread, whole-wheat pasta and brown rice instead of white grains and rice.    Talk to your provider about Calcium and Vitamin D.     Lifestyle    Exercise at least 150 minutes a week (30 minutes a day, 5 days a week). This will help you control your weight and prevent disease.    Limit alcohol to one drink per day.    No smoking.     Wear sunscreen to prevent skin cancer.     See your dentist every six months for an exam and cleaning.    See your eye doctor every 1 to 2 years.    Colorectal Cancer Screening  Colorectal cancer (cancer in the colon or rectum) is a leading cause of cancer deaths in the [place]. But it doesn t have to be. When this cancer is found and removed early, the chances of a full recovery are very good. Because colorectal cancer rarely causes symptoms in its  early stages, screening for the disease is important. It s even more crucial if you have risk factors for the disease. Learn more about colorectal cancer and its risk factors. Then talk to your healthcare provider about being screened. You could be saving your own life.  Risk factors for colorectal cancer  Your risk of having colorectal cancer increases if you:    Are 50 years of age or older    Have a family history or personal history of colorectal cancer or polyps    Have a personal history of type 2 diabetes, Crohn s disease, or ulcerative colitis    Have an inherited genetic syndrome like Mac syndrome (also known as HNPCC) or familial adenomatous polyposis (FAP)    Are very overweight    Are not physically active    Smoke    Drink a lot of alcohol    Eat a lot of red or processed meat  The colon and rectum        A SANDHYA can detect a growth in the rectum or anus.      Waste from food you eat enters the colon from the small intestine. As it travels through the colon, the waste (stool) loses water and becomes more solid. Intestinal muscles push it toward the sigmoid--the last section of the colon. Stool then moves into the rectum, where it s stored until it s ready to leave the body during a bowel movement.  How cancer develops  Polyps are growths that form on the inner lining of the colon or rectum. Most are benign, which means they aren t cancerous. But over time, some polyps can become malignant (cancerous). This happens when cells in these polyps begin growing abnormally. In time, malignant cells invade more and more of the colon and rectum. The cancer may also spread to nearby organs or lymph nodes or to other parts of the body. Finding and removing polyps can help prevent cancer from ever forming.  Your screening  Screening means looking for a medical problem before you have symptoms. During screening for colorectal cancer, your healthcare provider will ask about your medical history, examine you, and do  one or more tests.  History and exam  The history and exam involve the following:    Medical history. Your healthcare provider will ask about your medical history. Mention if a family member has had colon cancer or polyps. Also mention any health problems you have had in the past.    Digital rectal exam (SANDHYA). During a SANDHYA, the healthcare provider inserts a lubricated gloved finger into the rectum. The test is painless and takes less than a minute. Healthcare providers agree that this test alone is not enough to screen for colorectal cancer.  Screening test choices  Fecal occult blood test (FOBT) or fecal immunochemical test (FIT)  These tests check for occult blood in stool (blood you can t see). Hidden blood may be a sign of colon polyps or cancer. A small sample of stool is tested for blood in a laboratory. Most often, you collect this sample at home using a kit your healthcare provider gives you. Follow the instructions carefully for using this kit. You might need to avoid certain foods and medicines before the test, as directed.  Barium enema with contrast (double-contrast barium enema)  This test uses X-rays to provide images of the entire colon and rectum. The day before this test, you will need to do a bowel prep to clean out the colon and rectum. A bowel prep is a liquid diet plus strong laxatives or enemas. You will be awake for the test, but you may be given medicine to help you relax. At the start of the test, a radiologist (a healthcare provider who specializes in imaging tests) places a soft tube into the rectum. The tube is used to fill the colon with a contrast liquid (barium) and air. This can be uncomfortable for some people. The liquid helps the colon show up clearly on the X-rays. Because the test uses X-rays, it exposes you to a small amount of radiation.  Virtual colonoscopy  This exam is also called a CT colonography. It uses a series of X-ray photographs to create a 3-D view of the colon and  rectum. The day before the test, you will need to do a bowel prep to clean out your colon. Your healthcare provider will give you instructions on how to do this. During the procedure, you will lie on a table that is part of a special X-ray machine called a CT scanner. A small tube will be placed into your rectum to fill the colon and rectum with air. This can be uncomfortable for some people. Then, the table will move into the machine and pictures will be taken of your colon and rectum. A computer will combine these photos to create a 3-D picture. Because the test uses X-rays, it exposes you to a small amount of radiation.        Scope exams  Here are two types of scope exams:    Colonoscopy. This test can be used to find and remove polyps anywhere in the colon or rectum. The day before the test, you will do a bowel prep. This is a liquid diet plus a strong laxative solution or an enema. The bowel prep will cleanse your colon. You will be given instructions for this. Just before the test, you are given a medicine to make you sleepy. Then, a long, flexible, lighted tube called a colonoscope is gently inserted into the rectum and guided through the entire colon. Images of the colon are viewed on a video screen. Any polyps that are found are removed and sent to a lab for testing. If a polyp can t be removed, a sample of tissue is taken and the polyp might be removed later during surgery. You will need to bring someone with you to drive you home after this test.     Sigmoidoscopy. This test is similar to colonoscopy, but focuses only on the sigmoid colon and rectum. As with colonoscopy, bowel prep must be done the day before this test. It might not need to be as complete as the bowel prep for a colonoscopy. You are awake during the procedure, but you may be given medicine to help you relax. During the test, the healthcare provider guides a thin, flexible, lighted tube called a sigmoidoscope through your rectum and lower  colon. The images are displayed on a video screen. Polyps are removed, if possible, and sent to a lab for testing.  Colonoscopy is the only screening test that lets your healthcare provider see the entire colon and rectum. This test also lets your healthcare provider remove any pieces of tissue that need to be looked at by a lab. If something suspicious is found using any other tests, you will likely need a colonoscopy.  When to call your healthcare provider after a test  Call your healthcare provider if you have any of the following after any screening test:    Bleeding    Fever over 101 F (38.8 C)    Abdominal pain    Vomiting     2269-8367 [employer]. [address] [place] [address]. All rights reserved. This information is not intended as a substitute for professional medical care. Always follow your healthcare professional's instructions.        Weight Management: Healthy Eating  Food is your body s fuel. You can t live without it. The key is to give your body enough nutrients and energy without eating too much. Reading food labels can help you make healthy choices. Also, learn new eating habits to manage your weight.     All the values on the label are based on one serving. The serving size is the average portion. Remember to multiply the values on the label by the number of servings you eat.   Eat less fat  A gram of fat has almost 2.5 times the calories of a gram of protein or carbohydrates. Try to balance your food choices so that only 20% to 35% of your calories comes from total fat. This means an average of 2  to 3  grams of fat for each 100 calories you eat.  Eat more fiber  High-fiber foods are digested more slowly than low-fiber foods, so you feel full longer. Try to get 31 grams of fiber each day. Foods high in fiber include:    Vegetables and fruits    Whole-grain or bran breads, pastas, and cereals    Legumes (beans) and peas  As you begin to eat more fiber, be sure to drink  plenty of water to keep your digestive system working smoothly.  Tips    Don t skip meals. This often leads to overeating later on. It s best to spread your eating throughout the day.    Eat a variety of foods, not just a few favorites.    If you find yourself eating when you re not hungry, ask yourself why. Many of us eat when we re bored, stressed, or just to be polite. Listen to your body. If you re not hungry, get busy doing something else instead of eating.    Eat slower, shooting for 20 to 30 minutes for each meal. It takes 20 minutes for your stomach to tell your brain that it s full.    Pay attention to what you eat. Don t read or watch TV during your meal.    9274-8942 [employer]. [address] [place] [address], [place] [address]. All rights reserved. This information is not intended as a substitute for professional medical care. Always follow your healthcare professional's instructions.        Weight Management: Overcoming Your Barriers  You may have many reasons why you re not ready to lose weight. You may not feel you have the time or the skills. You may be afraid of losing weight and gaining it back again. Well, you can lose weight. And you can keep the weight off, if you make changes slowly and stick with them. Remember that you may never find the perfect time to lose weight. Decide that the right time to be healthier is now.    Common barriers  Barrier 1: I don t want to deny myself.  Barrier Buster: You don t have to! Moderation is the key.    Watch portion sizes and know when you're eating more than one serving.    Plan to ask for a doggy bag when you eat out.    Have just one.    Choose lower-fat and lower-calorie versions of your favorites.  Barrier 2: I lost weight before but I gained it right back.  Barrier Buster: Make this time different.    List what worked and didn t work last time and what you can try this time.    Choose changes that you are willing to stick with.    Work exercise  into your weight-loss plan.    Be realistic about what is possible.   Barrier 3: I don t have the time to be active.  Barrier Buster: It takes just a few minutes a day!    Be active with a pet or the kids.    Block off activity time in your schedule.    Borrow some time that you usually spend watching TV.    You are too important not to take time to exercise -- it is your life!   Feel good about yourself  Do you eat more because you feel bad about yourself, then feel even worse as you gain weight? This is a  vicious cycle.  Breaking this cycle is not easy. You may need group support or counseling. Always remember that you are a valuable person, no matter what size or shape you are.  Do you have a health problem? If so, don t use it as an excuse for not losing weight. Ask your doctor, dietitian, or other health care provider about methods to lose weight that are safe for you. For example, even if you have severe arthritis, you can walk in a pool. Get advice from a .      [id number] [employer]. [address] [place] [address], [place] [address]. All rights reserved. This information is not intended as a substitute for professional medical care. Always follow your healthcare professional's instructions.          Ami Augustine NP, APRN [facility]

## 2017-04-22 ASSESSMENT — ANXIETY QUESTIONNAIRES: GAD7 TOTAL SCORE: 4

## 2017-04-22 ASSESSMENT — PATIENT HEALTH QUESTIONNAIRE - PHQ9: SUM OF ALL RESPONSES TO PHQ QUESTIONS 1-9: 3

## 2017-05-08 PROCEDURE — 82274 ASSAY TEST FOR BLOOD FECAL: CPT | Performed by: NURSE PRACTITIONER

## 2017-05-09 DIAGNOSIS — Z12.11 COLON CANCER SCREENING: ICD-10-CM

## 2017-05-09 LAB — HEMOCCULT STL QL IA: NEGATIVE

## 2017-05-20 DIAGNOSIS — I10 ESSENTIAL HYPERTENSION, BENIGN: ICD-10-CM

## 2017-05-20 DIAGNOSIS — F41.9 ANXIETY: ICD-10-CM

## 2017-05-22 RX ORDER — BUPROPION HYDROCHLORIDE 150 MG/1
150 TABLET ORAL EVERY MORNING
Qty: 90 TABLET | Refills: 1 | Status: SHIPPED | OUTPATIENT
Start: 2017-05-22 | End: 2017-11-21

## 2017-05-22 RX ORDER — AMLODIPINE BESYLATE 5 MG/1
TABLET ORAL
Qty: 90 TABLET | Refills: 1 | Status: SHIPPED | OUTPATIENT
Start: 2017-05-22 | End: 2017-12-28

## 2017-05-22 NOTE — TELEPHONE ENCOUNTER
bupropion       Last Written Prescription Date: 4/18/17  Last Fill Quantity: 30; # refills: 0  Last Office Visit with [facility] or [facility] provider:  11/10/16        Last PHQ-9 score on record=   PHQ-9 SCORE 4/21/2017   Total Score -   Total Score 3       Lab Results   Component Value Date    AST 14 04/21/2017     Lab Results   Component Value Date    ALT 16 04/21/2017     amlodipine      Last Written Prescription Date: 11/10/16  Last Fill Quantity: 90, # refills: 1    Last Office Visit with [facility] prescribing provider:  11/10/16   Future Office Visit:        BP Readings from Last 3 Encounters:   04/21/17 128/74   11/10/16 134/86   06/10/16 101/47

## 2017-06-16 ENCOUNTER — TELEPHONE (OUTPATIENT)
Dept: FAMILY MEDICINE | Facility: CLINIC | Age: 62
End: 2017-06-16

## 2017-06-16 NOTE — TELEPHONE ENCOUNTER
Panel Management Review      Patient has the following on her problem list:     Depression / Dysthymia review  PHQ-9 SCORE 1/15/2016 10/13/2016 4/21/2017   Total Score - - -   Total Score 18 2 3      Patient is due for:  None    Hypertension   Last three blood pressure readings:  BP Readings from Last 3 Encounters:   04/21/17 128/74   11/10/16 134/86   06/10/16 101/47     Blood pressure: Passed    HTN Guidelines:  Age 18-59 BP range:  Less than 140/90  Age 60-85 with Diabetes:  Less than 140/90  Age 60-85 without Diabetes:  less than 150/90      Composite cancer screening  Chart review shows that this patient is due/due soon for the following Fecal Colorectal (FIT)  Summary:    Patient is due/failing the following:   FIT    Action needed:   NONE.     Type of outreach:    NONE.  Patient completed the FIT Test on 05/08/17.     Questions for provider review:    None                                                                                                                                    Lisa Magill, CMA

## 2017-07-16 DIAGNOSIS — F41.9 ANXIETY: ICD-10-CM

## 2017-07-17 RX ORDER — ESCITALOPRAM OXALATE 20 MG/1
TABLET ORAL
Qty: 90 TABLET | Refills: 0 | Status: SHIPPED | OUTPATIENT
Start: 2017-07-17 | End: 2017-09-19

## 2017-07-17 NOTE — TELEPHONE ENCOUNTER
#scitalopram     Last Written Prescription Date: 11/10/16  Last Fill Quantity: 90, # refills: 1  Last Office Visit with [facility] primary care provider:  04/21/17        Last PHQ-9 score on record=   PHQ-9 SCORE 4/21/2017   Total Score -   Total Score 3

## 2017-07-25 DIAGNOSIS — I10 ESSENTIAL HYPERTENSION, BENIGN: ICD-10-CM

## 2017-07-25 RX ORDER — METOPROLOL SUCCINATE 50 MG/1
TABLET, EXTENDED RELEASE ORAL
Qty: 90 TABLET | Refills: 0 | Status: SHIPPED | OUTPATIENT
Start: 2017-07-25 | End: 2017-10-24

## 2017-07-25 NOTE — TELEPHONE ENCOUNTER
metoprolol      Last Written Prescription Date: 11/10/16  Last Fill Quantity: 90, # refills: 1    Last Office Visit with [facility] or [facility] prescribing provider:  04/21/17   Future Office Visit:        BP Readings from Last 3 Encounters:   04/21/17 128/74   11/10/16 134/86   06/10/16 101/47

## 2017-09-05 ENCOUNTER — OFFICE VISIT (OUTPATIENT)
Dept: ENDOCRINOLOGY | Facility: CLINIC | Age: 62
End: 2017-09-05

## 2017-09-05 VITALS
HEART RATE: 54 BPM | SYSTOLIC BLOOD PRESSURE: 134 MMHG | WEIGHT: 264 LBS | HEIGHT: 68 IN | OXYGEN SATURATION: 97 % | BODY MASS INDEX: 40.01 KG/M2 | DIASTOLIC BLOOD PRESSURE: 85 MMHG

## 2017-09-05 DIAGNOSIS — E66.01 MORBID OBESITY, UNSPECIFIED OBESITY TYPE (H): Primary | ICD-10-CM

## 2017-09-05 RX ORDER — NALTREXONE HYDROCHLORIDE 50 MG/1
TABLET, FILM COATED ORAL
Qty: 30 TABLET | Refills: 3 | Status: SHIPPED | OUTPATIENT
Start: 2017-09-05 | End: 2018-04-18

## 2017-09-05 ASSESSMENT — PAIN SCALES - GENERAL: PAINLEVEL: NO PAIN (0)

## 2017-09-05 NOTE — MR AVS SNAPSHOT
After Visit Summary   9/5/2017    Donita Lerner    MRN: [id number]           Patient Information     Date Of Birth          1955        Visit Information        Provider Department      9/5/2017 12:00 PM Amaya Silverio MD  Health Medical Weight Management        Today's Diagnoses     Morbid obesity, unspecified obesity type (H)    -  1      Care Instructions    Start naltrexone 1/2 tab or 25 mg - take 1-2 hours before worst cravings - daily. Work up to 50 mg daily after a few days if tolerating lower dose.      1,200-1,300 calorie meal plan to lose 1 lbs weekly without exercise  Use meal replacements such as Teresa's meals, Lean Cuisines, Healthy Choice, Smart Ones, Weight Watchers Meals, and Slim Fast and Glucerna shakes and supplement with fresh fruits and vegetables  Please drink a lot of water daily. Most people typically need about 2 liters of water daily and more if they are exercising, have a large weight, or have a fever or illness. Add Crystal Light for flavoring if desired. But no pop with calories in it.  Please keep a food journal of what you eat, calories in what you eat, and mood and bring the journal with you to your next appointment.  Consider using applications for smart phones such as ProHatch, Shanda Games, PopdeemRecipes, LoseIt, Tap&Track, and RelaxM.  Focus on wet volumetrics, meaning, eat more foods that are high in water and fiber such as fruits and vegetables in order to get that full feeling. These are also good for your overall health as well.  Check out Dr. Monique Barrios from [facility] - she has cookbooks with low calorie volumetric recipes  You can try Let's Dish to help you prepare meals for you and your family. Often times, the caloric and nutrition data and serving sizes are available for this food. This can be a time saving maneuver. The website can give you more information http://www.Kitware.Viridity Energy/  Jeanine's Delivers has Let's Dish & fresh low calorie  salads  Check out Hello Fresh at https://www.Causata.Zoobean/food-boxes/classic-box/  Try Cooking Light recipes for low calorie meal preparation and planning  Other food plan options you can search for on the internet and check out include: Lavinia KNOTT, Esme Negron  Please consider health psychologist to discuss how depression and/or anxiety impact your eating.  Pool exercise or bike 60 minutes x 5 days weekly              Follow-ups after your visit        Follow-up notes from your care team     Return in about 3 months (around 12/5/2017).      Who to contact     Please call your clinic at [phone] to:    Ask questions about your health    Make or cancel appointments    Discuss your medicines    Learn about your test results    Speak to your doctor   If you have compliments or concerns about an experience at your clinic, or if you wish to file a complaint, please contact [facility] Patient Relations at [phone] or email us at [email or web address]         Additional Information About Your Visit        Nimbus LLChart Information     Pipeliner CRM gives you secure access to your electronic health record. If you see a primary care provider, you can also send messages to your care team and make appointments. If you have questions, please call your primary care clinic.  If you do not have a primary care provider, please call [phone] and they will assist you.      Pipeliner CRM is an electronic gateway that provides easy, online access to your medical records. With Pipeliner CRM, you can request a clinic appointment, read your test results, renew a prescription or communicate with your care team.     To access your existing account, please contact your [facility] or call [phone] for assistance.        Care EveryWhere ID     This is your Care EveryWhere ID. This could be used by other organizations to access your [place] medical records  [id number]       "  Your Vitals Were     Pulse Height Last Period Pulse Oximetry BMI (Body Mass Index)       54 1.727 m (5' 8\") 07/01/2002 97% 40.14 kg/m2        Blood Pressure from Last 3 Encounters:   09/05/17 134/85   04/21/17 128/74   11/10/16 134/86    Weight from Last 3 Encounters:   09/05/17 119.7 kg (264 lb)   04/21/17 119.7 kg (264 lb)   11/10/16 121.1 kg (267 lb)              Today, you had the following     No orders found for display         Today's Medication Changes          These changes are accurate as of: 9/5/17 12:33 PM.  If you have any questions, ask your nurse or doctor.               Start taking these medicines.        Dose/Directions    naltrexone 50 MG tablet   Commonly known as:  DEPADE;REVIA   Used for:  Morbid obesity, unspecified obesity type (H)   Started by:  Amaya Silverio MD        Take 1/2 tablet.  Time it one to two hours prior to worst cravings.  Then increase to one full tablet as instructed.   Quantity:  30 tablet   Refills:  3         These medicines have changed or have updated prescriptions.        Dose/Directions    vitamin D 2000 UNITS tablet   This may have changed:  when to take this   Used for:  Routine general medical examination at a health care facility        Dose:  2000 Units   Take 2,000 Units by mouth daily   Quantity:  100 tablet   Refills:  3            Where to get your medicines      These medications were sent to [facility] [address] [place] [address] [address]  [address] [address]     Phone:  [phone]     naltrexone 50 MG tablet                Primary Care Provider Office Phone # Fax #    Daya Debbie Bruno [phone]       [id number]  [address]   [facility] [address]        Equal Access to Services     [facility] BETHANIE AH: Liliane Graham, wanadege ortega, qaybta kaalmakamini petit, sonia covarrubias. So [facility] [phone].    ATENCIÓN: Si habla español, tiene a escobar disposición servicios " penny de asistencia lingüística. Shanda price [phone].    We comply with applicable federal civil rights laws and [place] laws. We do not discriminate on the basis of race, color, national origin, age, disability sex, sexual orientation or gender identity.            Thank you!     Thank you for choosing [facility]  for your care. Our goal is always to provide you with excellent care. Hearing back from our patients is one way we can continue to improve our services. Please take a few minutes to complete the written survey that you may receive in the mail after your visit with us. Thank you!             Your Updated Medication List - Protect others around you: Learn how to safely use, store and throw away your medicines at www.disposemymeds.org.          This list is accurate as of: 9/5/17 12:33 PM.  Always use your most recent med list.                   Brand Name Dispense Instructions for use Diagnosis    amLODIPine 5 MG tablet    NORVASC    90 tablet    TAKE ONE TABLET BY MOUTH ONCE DAILY    Essential hypertension, benign       aspirin 81 MG tablet     90 tablet    Take 1 tablet (81 mg) by mouth daily        buPROPion 150 MG 24 hr tablet    WELLBUTRIN XL    90 tablet    Take 1 tablet (150 mg) by mouth every morning    Anxiety       escitalopram 20 MG tablet    LEXAPRO    90 tablet    TAKE ONE TABLET BY MOUTH ONCE DAILY    Anxiety       metoprolol 50 MG 24 hr tablet    TOPROL-XL    90 tablet    TAKE ONE TABLET BY MOUTH ONCE DAILY    Essential hypertension, benign       naltrexone 50 MG tablet    DEPADE;REVIA    30 tablet    Take 1/2 tablet.  Time it one to two hours prior to worst cravings.  Then increase to one full tablet as instructed.    Morbid obesity, unspecified obesity type (H)       vitamin D 2000 UNITS tablet     100 tablet    Take 2,000 Units by mouth daily    Routine general medical examination at a health care facility

## 2017-09-05 NOTE — NURSING NOTE
"(   Chief Complaint   Patient presents with     Consult     new pt    )    ( Weight: 264 lb )  ( Height: 5' 8\" )  ( BMI (Calculated): 40.22 )  (   )  (   )  (   )  (   )  (   )  (   )    ( BP: 134/85 )  (   )  (   )  (   )  ( Pulse: 54 )  (   )  ( SpO2: 97 % )    (   Patient Active Problem List   Diagnosis     Generalized anxiety disorder     Symptomatic menopausal or female climacteric states     Sensorineural hearing loss     CNS DISORDER -gliosis on mri     S/P total knee arthroplasty     Advanced care planning/counseling discussion     Abnormal MRI of head     Essential hypertension      obesity, unspecified obesity type (HCC)     Major depressive disorder, single episode, mild (H)     S/P left unicompartmental knee replacement     Osteoporosis     Seasonal affective disorder (H)    )  (   Current Outpatient Prescriptions   Medication Sig Dispense Refill     metoprolol (TOPROL-XL) 50 MG 24 hr tablet TAKE ONE TABLET BY MOUTH ONCE DAILY 90 tablet 0     escitalopram (LEXAPRO) 20 MG tablet TAKE ONE TABLET BY MOUTH ONCE DAILY 90 tablet 0     buPROPion (WELLBUTRIN XL) 150 MG 24 hr tablet Take 1 tablet (150 mg) by mouth every morning 90 tablet 1     amLODIPine (NORVASC) 5 MG tablet TAKE ONE TABLET BY MOUTH ONCE DAILY 90 tablet 1     aspirin 81 MG tablet Take 1 tablet (81 mg) by mouth daily 90 tablet 3     Cholecalciferol (VITAMIN D) 2000 UNITS tablet Take 2,000 Units by mouth daily (Patient taking differently: Take 2,000 Units by mouth 2 times daily ) 100 tablet 3    )  ( Diabetes Eval:    )    ( Pain Eval:  No Pain (0) )    ( Wound Eval:       )    (   History   Smoking Status     Never Smoker   Smokeless Tobacco     Never Used    )    ( Signed By:  Anthony Norris; September 5, 2017; 12:02 PM )    "

## 2017-09-05 NOTE — PATIENT INSTRUCTIONS
Start naltrexone 1/2 tab or 25 mg - take 1-2 hours before worst cravings - daily. Work up to 50 mg daily after a few days if tolerating lower dose.      1,200-1,300 calorie meal plan to lose 1 lbs weekly without exercise  Use meal replacements such as Teresa's meals, Lean Cuisines, Healthy Choice, Smart Ones, Weight Watchers Meals, and Slim Fast and Glucerna shakes and supplement with fresh fruits and vegetables  Please drink a lot of water daily. Most people typically need about 2 liters of water daily and more if they are exercising, have a large weight, or have a fever or illness. Add Crystal Light for flavoring if desired. But no pop with calories in it.  Please keep a food journal of what you eat, calories in what you eat, and mood and bring the journal with you to your next appointment.  Consider using applications for smart phones such as MyActivityPal, Ozmott, Liquipel, LoseIt, Tap&Track, and RelaxM.  Focus on wet volumetrics, meaning, eat more foods that are high in water and fiber such as fruits and vegetables in order to get that full feeling. These are also good for your overall health as well.  Check out Dr. Monique Barrios from [facility] - she has cookbooks with low calorie volumetric recipes  You can try Let's Dish to help you prepare meals for you and your family. Often times, the caloric and nutrition data and serving sizes are available for this food. This can be a time saving maneuver. The website can give you more information http://www.Receept.EraGen Biosciences/  Coborn's Delivers has Let's Dish & fresh low calorie salads  Check out Hello Fresh at https://www.Glo Bags/food-boxes/classic-box/  Try Cooking Light recipes for low calorie meal preparation and planning  Other food plan options you can search for on the internet and check out include: Lavinia KNOTT, [facility]  Please consider health psychologist to discuss how depression and/or anxiety impact your eating.  Pool exercise or bike 60 minutes x  5 days weekly

## 2017-09-05 NOTE — PROGRESS NOTES
"    New Medical Weight Management Consult    PATIENT:  Donita Lerner  MRN:         [id number]  :         1955  SHANA:         2017    Dear Kiana, Daya Lewis,    I had the pleasure of seeing your patient, Dointa Lerner.  Full intake/assessment done to determine barriers to weight loss success and develop a treatment plan.  Donita Lerner is a 61 year old female interested in treatment of medical problems associated with weight.  Her weight today is 264 lbs 0 oz, Body mass index is 40.14 kg/(m^2)., and she has the following co-morbidities:     2017   I have the following co-morbidities associated with obesity: High Blood Pressure, Weight Bearing Joint Pain       Patient Goals Reviewed With Patient 2017   I am interested in attaining a healthier weight to diminish current health problems related to co-morbid conditions: Yes   I am interested in attaining a healthier weight in order to prevent future health problems: Yes       Referring Provider 2017   Please name the provider who referred you to Medical Weight Management.  If you do not know, please answer: \"I Don't Know\". I dont know       Wt Readings from Last 4 Encounters:   17 119.7 kg (264 lb)   17 119.7 kg (264 lb)   11/10/16 121.1 kg (267 lb)   16 116.8 kg (257 lb 9.6 oz)       Weight History Reviewed With Patient 2017   How concerned are you about your weight? Very Concerned   Would you describe your weight gain as gradual? Yes   I became overweight: As an Adult   The following factors have contributed to my weight gain:  A Health Crisis/Stress, Eating Too Much, Lack of Exercise   I have tried the following methods to lose weight: Watching Portions or Calories, Exercise, Weight Watchers   I have the following family history of obesity/being overweight:  Many of my relatives are overweight   Has anyone in your family had weight loss surgery? No       No flowsheet data found.    Eating Habits Reviewed With " Patient 9/5/2017   Generally, my meals include foods like these: bread, pasta, rice, potatoes, corn, crackers, sweet dessert, pop, or juice. Almost Everyday   Generally, my meals include foods like these: fried meats, brats, burgers, french fries, pizza, cheese, chips, or ice cream. Half of the Week   Eat fast food (like McDonalds, BurSecret Stoney, Power.com Bell). Never   Eat at a buffet or sit-down restaurant. Never   Eat most of my meals in front of the TV or computer. Almost Everyday   Often skip meals, eat at random times, have no regular eating times. A Few Times a Week   Rarely sit down for a meal but snack or graze throughout.  A Few Times a Week   Eat extra snacks between meals. Almost Everyday   Eat most of my food at the end of the day. Almost Everyday   Eat in the middle of the night or wake up at night to eat. Never   Eat extra snacks to prevent or correct low blood sugar. Never   Eat to prevent acid reflux or stomach pain. Never   Worry about not having enough food to eat. Never   Have you been to the food shelf at least a few times this year? No   I eat when I am depressed, stressed, anxious, or bored. Everyday   I eat when I am happy or as a reward. A Few Times a Week   I feel hungry all the time even if I just have eaten. Almost Everyday   Feeling full is important to me. Everyday   Once I start eating, it is hard to stop. Almost Everyday   I finish all the food on my plate even if I am already full. Almost Everyday   I can't resist eating delicious food or walk past the good food/smell. A Few Times a Week   I eat/snack without noticing that I am eating. Almost Everyday   I eat when I am preparing the meal. Almost Everyday   I eat more than usual when I see others eating. Almost Everyday   I have trouble not eating sweets, ice cream, cookies, or chips if they are around the house. Almost Everyday   I think about food all day. A Few Times a Week   What foods, if any, do you crave? Cheese   Please list any  other foods you crave? bread     ROS    PAST MEDICAL HISTORY:  Past Medical History:   Diagnosis Date     Arthritis      Depressive disorder, not elsewhere classified      Generalized anxiety disorder      Hypertension     No cardiologist     Meniere's disease 5/17/2005     Problem list name updated by automated process. 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 Less Than 50%   What is your marital status? /In a Relationship   If in a relationship, is your significant other overweight? Yes   Do you have children? Yes   If you have children, are they overweight? No       Mental Health History Reviewed With Patient 9/5/2017   Have you ever been physically or sexually abused? No   How often in the past 2 weeks have you felt little interest or pleasure in doing things? More Than Half the Days   Over the past 2 weeks how often have you felt down, depressed, or hopeless? More Than Half the Days       No flowsheet data found.    MEDICATIONS:   Current Outpatient Prescriptions   Medication Sig Dispense Refill     metoprolol (TOPROL-XL) 50 MG 24 hr tablet TAKE ONE TABLET BY MOUTH ONCE DAILY 90 tablet 0     escitalopram (LEXAPRO) 20 MG tablet TAKE ONE TABLET BY MOUTH ONCE DAILY 90 tablet 0     buPROPion (WELLBUTRIN XL) 150 MG 24 hr tablet Take 1 tablet (150 mg) by mouth every morning 90 tablet 1     amLODIPine (NORVASC) 5 MG tablet TAKE ONE TABLET BY MOUTH ONCE DAILY 90 tablet 1     aspirin 81 MG tablet Take 1 tablet (81 mg) by mouth daily 90 tablet 3     Cholecalciferol (VITAMIN D) 2000 UNITS tablet Take 2,000 Units by mouth daily (Patient taking differently:  "Take 2,000 Units by mouth 2 times daily ) 100 tablet 3       ALLERGIES:   Allergies   Allergen Reactions     Penicillins Anaphylaxis and Swelling     Lisinopril Cough     Sulfa Drugs Rash       PHYSICAL EXAM:  /85  Pulse 54  Ht 1.727 m (5' 8\")  Wt 119.7 kg (264 lb)  LMP 07/01/2002  SpO2 97%  BMI 40.14 kg/m2   A & O x 3  HEENT: NCAT, mucous membranes moist  Respirations unlabored  Location of obesity: Mixed Obesity    ASSESSMENT:  Donita is a patient with mature onset morbid obesity with significant element of familial/genetic influence and with current health consequences. She does need aggressive weight loss plan due to BMI>40.  Donita Lerner eats a high carb diet, eats a high fat diet, uses food as a reward, uses food as mood management, has perception of intense hunger, eats most meals in front of TV, mostly eats during the evening and tends to snack/graze throughout day, rarely sitting to eat a true meal.    Her problem is complicated by a hunger disorder, strong craving/reward pathways, mental health/psychopharmacological barriers, poor lifestyle choices and is s/p 2 knee replacements.    Her ability to lose weight is impacted by physical impairment and food cravings.    PLAN:    Craving/Reward   Ancillary testing:  N/A.  Food Plan:  See below.   Activity Plan:  See below.  Supplementary:  N/A.   Medication:  The patient will begin medication in pursuit of improved medical status as influenced by body weight. She will start naltrexone. Patient was made aware that naltrexone is not approved for the treatment of obesity.  There is a mutual understanding of the goals and risks of this therapy. The patient is in agreement. She is educated on dosage regimen and possible side effects.      Start naltrexone 1/2 tab or 25 mg - take 1-2 hours before worst cravings - daily. Work up to 50 mg daily after a few days if tolerating lower dose.    1,200-1,300 calorie meal plan to lose 1 lbs weekly without " exercise  Use meal replacements such as Teresa's meals, Lean Cuisines, Healthy Choice, Smart Ones, Weight Watchers Meals, and Slim Fast and Glucerna shakes and supplement with fresh fruits and vegetables  Please drink a lot of water daily. Most people typically need about 2 liters of water daily and more if they are exercising, have a large weight, or have a fever or illness. Add Crystal Light for flavoring if desired. But no pop with calories in it.  Please keep a food journal of what you eat, calories in what you eat, and mood and bring the journal with you to your next appointment.  Consider using applications for smart phones such as TARGET BRAZIL, Wututu, uberVU, LoseIt, Tap&Track, and RelaxM.  Focus on wet volumetrics, meaning, eat more foods that are high in water and fiber such as fruits and vegetables in order to get that full feeling. These are also good for your overall health as well.  Check out Dr. Monique Barrios from [facility] - she has cookbooks with low calorie volumetric recipes  You can try Let's Dish to help you prepare meals for you and your family. Often times, the caloric and nutrition data and serving sizes are available for this food. This can be a time saving maneuver. The website can give you more information http://www.Executive Trading Solutions.Food Reporter/  Cobwilliam's Delivers has Let's Dish & fresh low calorie salads  Check out Hello Fresh at https://www.Lightningcast.Food Reporter/food-boxes/classic-box/  Try Cooking Light recipes for low calorie meal preparation and planning  Other food plan options you can search for on the internet and check out include: Lavinia KNOTT, [facility]  Please consider health psychologist to discuss how depression and/or anxiety impact your eating.  Pool exercise or bike 60 minutes x 5 days weekly      RTC:    3 months    TIME: 25 min spent on evaluation, management, counseling, education, & motivational interviewing with greater than 50 % of the total time was spent on counseling and  coordinating care    Sincerely,    Amaya Silverio MD

## 2017-09-05 NOTE — LETTER
"2017       RE: Donita Lerner  [address] [facility] [address]     Dear Colleague,    Thank you for referring your patient, Donita Lerner, to the [facility] MEDICAL WEIGHT MANAGEMENT at [facility]. Please see a copy of my visit note below.      New Medical Weight Management Consult    PATIENT:  Donita Lerner  MRN:         [id number]  :         1955  SHANA:         2017    Dear Daya Bruno,    I had the pleasure of seeing your patient, Donita Lerner.  Full intake/assessment done to determine barriers to weight loss success and develop a treatment plan.  Donita Lerner is a 61 year old female interested in treatment of medical problems associated with weight.  Her weight today is 264 lbs 0 oz, Body mass index is 40.14 kg/(m^2)., and she has the following co-morbidities:     2017   I have the following co-morbidities associated with obesity: High Blood Pressure, Weight Bearing Joint Pain       Patient Goals Reviewed With Patient 2017   I am interested in attaining a healthier weight to diminish current health problems related to co-morbid conditions: Yes   I am interested in attaining a healthier weight in order to prevent future health problems: Yes       Referring Provider 2017   Please name the provider who referred you to Medical Weight Management.  If you do not know, please answer: \"I Don't Know\". I dont know       Wt Readings from Last 4 Encounters:   17 119.7 kg (264 lb)   17 119.7 kg (264 lb)   11/10/16 121.1 kg (267 lb)   16 116.8 kg (257 lb 9.6 oz)       Weight History Reviewed With Patient 2017   How concerned are you about your weight? Very Concerned   Would you describe your weight gain as gradual? Yes   I became overweight: As an Adult   The following factors have contributed to my weight gain:  A Health Crisis/Stress, Eating Too Much, Lack of Exercise   I have tried the following methods to lose " weight: Watching Portions or Calories, Exercise, Weight Watchers   I have the following family history of obesity/being overweight:  Many of my relatives are overweight   Has anyone in your family had weight loss surgery? No       No flowsheet data found.    Eating Habits Reviewed With Patient 9/5/2017   Generally, my meals include foods like these: bread, pasta, rice, potatoes, corn, crackers, sweet dessert, pop, or juice. Almost Everyday   Generally, my meals include foods like these: fried meats, brats, burgers, french fries, pizza, cheese, chips, or ice cream. Half of the Week   Eat fast food (like McDonalds, [employer]Exec[employer], Taco Bell). Never   Eat at a buffet or sit-down restaurant. Never   Eat most of my meals in front of the TV or computer. Almost Everyday   Often skip meals, eat at random times, have no regular eating times. A Few Times a Week   Rarely sit down for a meal but snack or graze throughout.  A Few Times a Week   Eat extra snacks between meals. Almost Everyday   Eat most of my food at the end of the day. Almost Everyday   Eat in the middle of the night or wake up at night to eat. Never   Eat extra snacks to prevent or correct low blood sugar. Never   Eat to prevent acid reflux or stomach pain. Never   Worry about not having enough food to eat. Never   Have you been to the food shelf at least a few times this year? No   I eat when I am depressed, stressed, anxious, or bored. Everyday   I eat when I am happy or as a reward. A Few Times a Week   I feel hungry all the time even if I just have eaten. Almost Everyday   Feeling full is important to me. Everyday   Once I start eating, it is hard to stop. Almost Everyday   I finish all the food on my plate even if I am already full. Almost Everyday   I can't resist eating delicious food or walk past the good food/smell. A Few Times a Week   I eat/snack without noticing that I am eating. Almost Everyday   I eat when I am preparing the meal. Almost Everyday   I  eat more than usual when I see others eating. Almost Everyday   I have trouble not eating sweets, ice cream, cookies, or chips if they are around the house. Almost Everyday   I think about food all day. A Few Times a Week   What foods, if any, do you crave? Cheese   Please list any other foods you crave? bread     ROS    PAST MEDICAL HISTORY:  Past Medical History:   Diagnosis Date     Arthritis      Depressive disorder, not elsewhere classified      Generalized anxiety disorder      Hypertension     No cardiologist     Meniere's disease 5/17/2005     Problem list name updated by automated process. 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 Less Than 50%   What is your marital status? /In a Relationship   If in a relationship, is your significant other overweight? Yes   Do you have children? Yes   If you have children, are they overweight? No       Mental Health History Reviewed With Patient 9/5/2017   Have you ever been physically or sexually abused? No   How often in the past 2 weeks have you felt little interest or pleasure in doing things? More Than Half the Days   Over the past 2 weeks how often have you felt down, depressed, or hopeless? More Than Half the Days       No flowsheet data found.    MEDICATIONS:   Current Outpatient Prescriptions   Medication Sig Dispense Refill     metoprolol (TOPROL-XL) 50 MG 24 hr tablet TAKE ONE TABLET BY MOUTH ONCE DAILY 90 tablet 0     escitalopram (LEXAPRO) 20 MG tablet TAKE ONE TABLET BY MOUTH ONCE DAILY 90 tablet 0     buPROPion (WELLBUTRIN XL) 150 MG 24 hr tablet Take 1 tablet (150 mg) by mouth every morning  "90 tablet 1     amLODIPine (NORVASC) 5 MG tablet TAKE ONE TABLET BY MOUTH ONCE DAILY 90 tablet 1     aspirin 81 MG tablet Take 1 tablet (81 mg) by mouth daily 90 tablet 3     Cholecalciferol (VITAMIN D) 2000 UNITS tablet Take 2,000 Units by mouth daily (Patient taking differently: Take 2,000 Units by mouth 2 times daily ) 100 tablet 3       ALLERGIES:   Allergies   Allergen Reactions     Penicillins Anaphylaxis and Swelling     Lisinopril Cough     Sulfa Drugs Rash       PHYSICAL EXAM:  /85  Pulse 54  Ht 1.727 m (5' 8\")  Wt 119.7 kg (264 lb)  LMP 07/01/2002  SpO2 97%  BMI 40.14 kg/m2   A & O x 3  HEENT: NCAT, mucous membranes moist  Respirations unlabored  Location of obesity: Mixed Obesity    ASSESSMENT:  Donita is a patient with mature onset morbid obesity with significant element of familial/genetic influence and with current health consequences. She does need aggressive weight loss plan due to BMI>40.  Donita Lerner eats a high carb diet, eats a high fat diet, uses food as a reward, uses food as mood management, has perception of intense hunger, eats most meals in front of TV, mostly eats during the evening and tends to snack/graze throughout day, rarely sitting to eat a true meal.    Her problem is complicated by a hunger disorder, strong craving/reward pathways, mental health/psychopharmacological barriers, poor lifestyle choices and is s/p 2 knee replacements.    Her ability to lose weight is impacted by physical impairment and food cravings.    PLAN:    Craving/Reward   Ancillary testing:  N/A.  Food Plan:  See below.   Activity Plan:  See below.  Supplementary:  N/A.   Medication:  The patient will begin medication in pursuit of improved medical status as influenced by body weight. She will start naltrexone. Patient was made aware that naltrexone is not approved for the treatment of obesity.  There is a mutual understanding of the goals and risks of this therapy. The patient is in agreement. She " is educated on dosage regimen and possible side effects.      Start naltrexone 1/2 tab or 25 mg - take 1-2 hours before worst cravings - daily. Work up to 50 mg daily after a few days if tolerating lower dose.    1,200-1,300 calorie meal plan to lose 1 lbs weekly without exercise  Use meal replacements such as Teresa's meals, Lean Cuisines, Healthy Choice, Smart Ones, Weight Watchers Meals, and Slim Fast and Glucerna shakes and supplement with fresh fruits and vegetables  Please drink a lot of water daily. Most people typically need about 2 liters of water daily and more if they are exercising, have a large weight, or have a fever or illness. Add Crystal Light for flavoring if desired. But no pop with calories in it.  Please keep a food journal of what you eat, calories in what you eat, and mood and bring the journal with you to your next appointment.  Consider using applications for smart phones such as Floxx, Knottykart, [employer]Mentors, LoseIt, Tap&Track, and RelaxM.  Focus on wet volumetrics, meaning, eat more foods that are high in water and fiber such as fruits and vegetables in order to get that full feeling. These are also good for your overall health as well.  Check out Dr. Monique Barrios from [facility] - she has cookbooks with low calorie volumetric recipes  You can try Let's Dish to help you prepare meals for you and your family. Often times, the caloric and nutrition data and serving sizes are available for this food. This can be a time saving maneuver. The website can give you more information http://www.Videoplaza.Dot/  C[employer] has Let's Dish & fresh low calorie salads  Check out Hello Fresh at https://www.Funding Circle/food-boxes/classic-box/  Try Cooking Light recipes for low calorie meal preparation and planning  Other food plan options you can search for on the internet and check out include: Lavinia KNOTT, [facility]  Please consider health psychologist to discuss how depression and/or  anxiety impact your eating.  Pool exercise or bike 60 minutes x 5 days weekly      RTC:    3 months    TIME: 25 min spent on evaluation, management, counseling, education, & motivational interviewing with greater than 50 % of the total time was spent on counseling and coordinating care    Sincerely,    Amaya Silverio MD

## 2017-09-19 DIAGNOSIS — F41.9 ANXIETY: ICD-10-CM

## 2017-09-19 RX ORDER — ESCITALOPRAM OXALATE 20 MG/1
TABLET ORAL
Qty: 90 TABLET | Refills: 0 | Status: SHIPPED | OUTPATIENT
Start: 2017-09-19 | End: 2017-10-19

## 2017-09-19 NOTE — TELEPHONE ENCOUNTER
escitalopram (LEXAPRO) 20 MG tablet     Last Written Prescription Date: 7/17/2017  Last Fill Quantity: 90, # refills: 0  Last Office Visit with [facility] primary care provider:  4/21/2017        Last PHQ-9 score on record=   PHQ-9 SCORE 4/21/2017   Total Score -   Total Score 3

## 2017-10-19 DIAGNOSIS — F41.9 ANXIETY: ICD-10-CM

## 2017-10-19 RX ORDER — ESCITALOPRAM OXALATE 20 MG/1
TABLET ORAL
Qty: 30 TABLET | Refills: 0 | Status: SHIPPED | OUTPATIENT
Start: 2017-10-19 | End: 2018-01-20

## 2017-10-24 DIAGNOSIS — I10 ESSENTIAL HYPERTENSION, BENIGN: ICD-10-CM

## 2017-10-24 RX ORDER — METOPROLOL SUCCINATE 50 MG/1
TABLET, EXTENDED RELEASE ORAL
Qty: 90 TABLET | Refills: 0 | Status: SHIPPED | OUTPATIENT
Start: 2017-10-24 | End: 2018-01-20

## 2017-11-21 DIAGNOSIS — F41.9 ANXIETY: ICD-10-CM

## 2017-11-21 RX ORDER — BUPROPION HYDROCHLORIDE 150 MG/1
TABLET ORAL
Qty: 90 TABLET | Refills: 0 | Status: SHIPPED | OUTPATIENT
Start: 2017-11-21 | End: 2018-02-23

## 2018-01-20 DIAGNOSIS — I10 ESSENTIAL HYPERTENSION, BENIGN: ICD-10-CM

## 2018-01-20 DIAGNOSIS — F41.9 ANXIETY: ICD-10-CM

## 2018-01-20 NOTE — TELEPHONE ENCOUNTER
"Requested Prescriptions   Pending Prescriptions Disp Refills     escitalopram (LEXAPRO) 20 MG tablet   Last Written Prescription Date:  10/19/17  Last Fill Quantity: 30,  # refills: 0   Last Office Visit with [facility] or [facility] prescribing provider:  11/10/16   Future Office Visit:      30 tablet 0     Sig: TAKE ONE TABLET BY MOUTH ONCE DAILY    SSRIs Protocol Failed    1/20/2018  8:06 AM       Failed - PHQ-9 score less than 5 in past 6 months    The PHQ-9 criteria is meant to fail. It requires a PHQ-9 score review  PHQ-9 SCORE 1/15/2016 10/13/2016 4/21/2017   Total Score - - -   Total Score 18 2 3     NATACHA-7 SCORE 5/11/2015 1/15/2016 4/21/2017   Total Score 1 - -   Total Score - 15 4                  Failed - Recent (6 mo) or future visit with authorizing provider's specialty    Patient had office visit in the last 6 months or has a visit in the next 30 days with authorizing provider.  See \"Patient Info\" tab in inbasket, or \"Choose Columns\" in Meds & Orders section of the refill encounter.           Passed - Recent or future visit with authorizing provider    Patient had office visit in the last year or has a visit in the next 30 days with authorizing provider.  See \"Patient Info\" tab in inbasket, or \"Choose Columns\" in Meds & Orders section of the refill encounter.            Passed - Patient is age 18 or older       Passed - No active pregnancy on record       Passed - No positive pregnancy test in last 12 months        metoprolol succinate (TOPROL-XL) 50 MG 24 hr tablet   Last Written Prescription Date:  10/24/17  Last Fill Quantity: 90,  # refills: 0   Last Office Visit with [facility], [facility] or [facility] prescribing provider:  11/10/16   Future Office Visit:      90 tablet 0     Sig: TAKE ONE TABLET BY MOUTH ONCE DAILY    Beta-Blockers Protocol Passed    1/20/2018  8:06 AM       Passed - Blood pressure under 140/90    BP Readings from Last 3 Encounters:   09/05/17 134/85   04/21/17 128/74   11/10/16 134/86             " "   Passed - Patient is age 6 or older       Passed - Recent or future visit with authorizing provider's specialty    Patient had office visit in the last year or has a visit in the next 30 days with authorizing provider.  See \"Patient Info\" tab in inbasket, or \"Choose Columns\" in Meds & Orders section of the refill encounter.               "

## 2018-01-22 RX ORDER — METOPROLOL SUCCINATE 50 MG/1
TABLET, EXTENDED RELEASE ORAL
Qty: 60 TABLET | Refills: 0 | Status: SHIPPED | OUTPATIENT
Start: 2018-01-22 | End: 2018-04-18

## 2018-01-22 RX ORDER — ESCITALOPRAM OXALATE 20 MG/1
TABLET ORAL
Qty: 60 TABLET | Refills: 0 | Status: SHIPPED | OUTPATIENT
Start: 2018-01-22 | End: 2018-03-27

## 2018-02-23 DIAGNOSIS — F41.9 ANXIETY: ICD-10-CM

## 2018-02-23 RX ORDER — BUPROPION HYDROCHLORIDE 150 MG/1
TABLET ORAL
Qty: 90 TABLET | Refills: 0 | Status: SHIPPED | OUTPATIENT
Start: 2018-02-23 | End: 2018-04-18

## 2018-02-23 NOTE — TELEPHONE ENCOUNTER
"Requested Prescriptions   Pending Prescriptions Disp Refills     buPROPion (WELLBUTRIN XL) 150 MG 24 hr tablet [Pharmacy Med Name: BUPROPION XL 150MG TAB] 90 tablet 0     Sig: TAKE ONE TABLET BY MOUTH ONCE DAILY IN THE MORNING    SSRIs Protocol Passed    2/23/2018 10:07 AM       Passed - Recent or future visit with authorizing provider    Patient had office visit in the last year or has a visit in the next 30 days with authorizing provider.  See \"Patient Info\" tab in inbasket, or \"Choose Columns\" in Meds & Orders section of the refill encounter.            Passed - Medication is Bupropion    If the medication is Bupropion (Wellbutrin), and the patient is taking for smoking cessation; OK to refill.         Passed - Patient is age 18 or older       Passed - No active pregnancy on record       Passed - No positive pregnancy test in last 12 months        PHQ-9 SCORE 1/15/2016 10/13/2016 4/21/2017   Total Score - - -   Total Score 18 2 3     NATACHA-7 SCORE 5/11/2015 1/15/2016 4/21/2017   Total Score 1 - -   Total Score - 15 4       Last Written Prescription Date:  11/21/2017  Last Fill Quantity: 90,  # refills: 0   Last office visit: 4/21/2017 with prescribing provider:  Fawn   Future Office Visit:      "

## 2018-03-27 DIAGNOSIS — F41.9 ANXIETY: ICD-10-CM

## 2018-03-27 RX ORDER — ESCITALOPRAM OXALATE 20 MG/1
TABLET ORAL
Qty: 60 TABLET | Refills: 0 | Status: SHIPPED | OUTPATIENT
Start: 2018-03-27 | End: 2018-04-18

## 2018-04-18 ENCOUNTER — OFFICE VISIT (OUTPATIENT)
Dept: FAMILY MEDICINE | Facility: CLINIC | Age: 63
End: 2018-04-18
Payer: COMMERCIAL

## 2018-04-18 VITALS
WEIGHT: 274.4 LBS | BODY MASS INDEX: 41.59 KG/M2 | DIASTOLIC BLOOD PRESSURE: 82 MMHG | HEIGHT: 68 IN | TEMPERATURE: 96.6 F | OXYGEN SATURATION: 99 % | RESPIRATION RATE: 18 BRPM | SYSTOLIC BLOOD PRESSURE: 138 MMHG | HEART RATE: 48 BPM

## 2018-04-18 DIAGNOSIS — E66.01 MORBID OBESITY, UNSPECIFIED OBESITY TYPE (H): ICD-10-CM

## 2018-04-18 DIAGNOSIS — E55.9 VITAMIN D DEFICIENCY: ICD-10-CM

## 2018-04-18 DIAGNOSIS — I10 ESSENTIAL HYPERTENSION, BENIGN: ICD-10-CM

## 2018-04-18 DIAGNOSIS — Z12.31 VISIT FOR SCREENING MAMMOGRAM: ICD-10-CM

## 2018-04-18 DIAGNOSIS — Z12.11 SCREEN FOR COLON CANCER: ICD-10-CM

## 2018-04-18 DIAGNOSIS — Z78.0 ASYMPTOMATIC POSTMENOPAUSAL STATUS: ICD-10-CM

## 2018-04-18 DIAGNOSIS — Z00.00 ROUTINE GENERAL MEDICAL EXAMINATION AT A HEALTH CARE FACILITY: Primary | ICD-10-CM

## 2018-04-18 DIAGNOSIS — F41.9 ANXIETY: ICD-10-CM

## 2018-04-18 LAB
ALBUMIN SERPL-MCNC: 3.8 G/DL (ref 3.4–5)
ALP SERPL-CCNC: 110 U/L (ref 40–150)
ALT SERPL W P-5'-P-CCNC: 16 U/L (ref 0–50)
ANION GAP SERPL CALCULATED.3IONS-SCNC: 2 MMOL/L (ref 3–14)
AST SERPL W P-5'-P-CCNC: 15 U/L (ref 0–45)
BASOPHILS # BLD AUTO: 0 10E9/L (ref 0–0.2)
BASOPHILS NFR BLD AUTO: 0.3 %
BILIRUB SERPL-MCNC: 0.7 MG/DL (ref 0.2–1.3)
BUN SERPL-MCNC: 19 MG/DL (ref 7–30)
CALCIUM SERPL-MCNC: 8.9 MG/DL (ref 8.5–10.1)
CHLORIDE SERPL-SCNC: 107 MMOL/L (ref 94–109)
CHOLEST SERPL-MCNC: 185 MG/DL
CO2 SERPL-SCNC: 30 MMOL/L (ref 20–32)
CREAT SERPL-MCNC: 0.71 MG/DL (ref 0.52–1.04)
DEPRECATED CALCIDIOL+CALCIFEROL SERPL-MC: 36 UG/L (ref 20–75)
DIFFERENTIAL METHOD BLD: NORMAL
EOSINOPHIL # BLD AUTO: 0.1 10E9/L (ref 0–0.7)
EOSINOPHIL NFR BLD AUTO: 1.9 %
ERYTHROCYTE [DISTWIDTH] IN BLOOD BY AUTOMATED COUNT: 14 % (ref 10–15)
GFR SERPL CREATININE-BSD FRML MDRD: 83 ML/MIN/1.7M2
GLUCOSE SERPL-MCNC: 89 MG/DL (ref 70–99)
HCT VFR BLD AUTO: 46.4 % (ref 35–47)
HDLC SERPL-MCNC: 63 MG/DL
HGB BLD-MCNC: 15.1 G/DL (ref 11.7–15.7)
LDLC SERPL CALC-MCNC: 105 MG/DL
LYMPHOCYTES # BLD AUTO: 1.6 10E9/L (ref 0.8–5.3)
LYMPHOCYTES NFR BLD AUTO: 23.8 %
MCH RBC QN AUTO: 30 PG (ref 26.5–33)
MCHC RBC AUTO-ENTMCNC: 32.5 G/DL (ref 31.5–36.5)
MCV RBC AUTO: 92 FL (ref 78–100)
MONOCYTES # BLD AUTO: 0.5 10E9/L (ref 0–1.3)
MONOCYTES NFR BLD AUTO: 7.8 %
NEUTROPHILS # BLD AUTO: 4.6 10E9/L (ref 1.6–8.3)
NEUTROPHILS NFR BLD AUTO: 66.2 %
NONHDLC SERPL-MCNC: 122 MG/DL
PLATELET # BLD AUTO: 232 10E9/L (ref 150–450)
POTASSIUM SERPL-SCNC: 4.5 MMOL/L (ref 3.4–5.3)
PROT SERPL-MCNC: 7.6 G/DL (ref 6.8–8.8)
RBC # BLD AUTO: 5.04 10E12/L (ref 3.8–5.2)
SODIUM SERPL-SCNC: 139 MMOL/L (ref 133–144)
TRIGL SERPL-MCNC: 85 MG/DL
WBC # BLD AUTO: 6.9 10E9/L (ref 4–11)

## 2018-04-18 PROCEDURE — 80053 COMPREHEN METABOLIC PANEL: CPT | Performed by: NURSE PRACTITIONER

## 2018-04-18 PROCEDURE — 80061 LIPID PANEL: CPT | Performed by: NURSE PRACTITIONER

## 2018-04-18 PROCEDURE — 85025 COMPLETE CBC W/AUTO DIFF WBC: CPT | Performed by: NURSE PRACTITIONER

## 2018-04-18 PROCEDURE — 82306 VITAMIN D 25 HYDROXY: CPT | Performed by: NURSE PRACTITIONER

## 2018-04-18 PROCEDURE — 99396 PREV VISIT EST AGE 40-64: CPT | Performed by: NURSE PRACTITIONER

## 2018-04-18 PROCEDURE — 36415 COLL VENOUS BLD VENIPUNCTURE: CPT | Performed by: NURSE PRACTITIONER

## 2018-04-18 RX ORDER — NALTREXONE HYDROCHLORIDE 50 MG/1
TABLET, FILM COATED ORAL
Qty: 30 TABLET | Refills: 3 | Status: SHIPPED | OUTPATIENT
Start: 2018-04-18 | End: 2019-04-03

## 2018-04-18 RX ORDER — BUPROPION HYDROCHLORIDE 150 MG/1
TABLET ORAL
Qty: 90 TABLET | Refills: 0 | Status: SHIPPED | OUTPATIENT
Start: 2018-04-18 | End: 2018-08-27

## 2018-04-18 RX ORDER — METOPROLOL SUCCINATE 25 MG/1
25 TABLET, EXTENDED RELEASE ORAL DAILY
Qty: 90 TABLET | Refills: 3 | Status: SHIPPED | OUTPATIENT
Start: 2018-04-18 | End: 2019-04-03

## 2018-04-18 RX ORDER — AMLODIPINE BESYLATE 5 MG/1
7.5 TABLET ORAL DAILY
Qty: 135 TABLET | Refills: 3 | Status: SHIPPED | OUTPATIENT
Start: 2018-04-18 | End: 2019-04-03

## 2018-04-18 RX ORDER — ESCITALOPRAM OXALATE 20 MG/1
20 TABLET ORAL DAILY
Qty: 60 TABLET | Refills: 0 | Status: SHIPPED | OUTPATIENT
Start: 2018-04-18 | End: 2018-08-03

## 2018-04-18 ASSESSMENT — PAIN SCALES - GENERAL: PAINLEVEL: MILD PAIN (2)

## 2018-04-18 ASSESSMENT — ANXIETY QUESTIONNAIRES
7. FEELING AFRAID AS IF SOMETHING AWFUL MIGHT HAPPEN: NOT AT ALL
1. FEELING NERVOUS, ANXIOUS, OR ON EDGE: SEVERAL DAYS
GAD7 TOTAL SCORE: 2
6. BECOMING EASILY ANNOYED OR IRRITABLE: NOT AT ALL
2. NOT BEING ABLE TO STOP OR CONTROL WORRYING: NOT AT ALL
5. BEING SO RESTLESS THAT IT IS HARD TO SIT STILL: NOT AT ALL
3. WORRYING TOO MUCH ABOUT DIFFERENT THINGS: NOT AT ALL

## 2018-04-18 ASSESSMENT — PATIENT HEALTH QUESTIONNAIRE - PHQ9: 5. POOR APPETITE OR OVEREATING: SEVERAL DAYS

## 2018-04-18 NOTE — MR AVS SNAPSHOT
After Visit Summary   4/18/2018    Donita Lerner    MRN: [id number]           Patient Information     Date Of Birth          1955        Visit Information        Provider Department      4/18/2018 9:00 AM Ami Augustine APRN [facility]        Today's Diagnoses     Routine general medical examination at a health care facility    -  1    Screen for colon cancer        Asymptomatic postmenopausal status        Visit for screening mammogram        Essential hypertension, benign        Anxiety        Morbid obesity, unspecified obesity type (H)        Vitamin D deficiency          Care Instructions    Increase your Amlodipine to 7.5 mg daily (one and a half tablets)    Decrease the Metoprolol XL to 25 mg daily    Check your blood pressure weekly for the next month and let us know the results in a month    You are due for your mammogram, please schedule: [place][phone], [place][phone], [place]=[phone], [facility] [phone] or Nnkzswn=[phone]  Complete and mail FIT (stool) test for colon cancer screening  Preventive Health Recommendations  Female Ages 50 - 64    Yearly exam: See your health care provider every year in order to  o Review health changes.   o Discuss preventive care.    o Review your medicines if your doctor has prescribed any.      Get a Pap test every three years (unless you have an abnormal result and your provider advises testing more often).    If you get Pap tests with HPV test, you only need to test every 5 years, unless you have an abnormal result.     You do not need a Pap test if your uterus was removed (hysterectomy) and you have not had cancer.    You should be tested each year for STDs (sexually transmitted diseases) if you're at risk.     Have a mammogram every 1 to 2 years.    Have a colonoscopy at age 50, or have a yearly FIT test (stool test). These exams screen for colon cancer.      Have a cholesterol test every 5  years, or more often if advised.    Have a diabetes test (fasting glucose) every three years. If you are at risk for diabetes, you should have this test more often.     If you are at risk for osteoporosis (brittle bone disease), think about having a bone density scan (DEXA).    Shots: Get a flu shot each year. Get a tetanus shot every 10 years.    Nutrition:     Eat at least 5 servings of fruits and vegetables each day.    Eat whole-grain bread, whole-wheat pasta and brown rice instead of white grains and rice.    Talk to your provider about Calcium and Vitamin D.     Lifestyle    Exercise at least 150 minutes a week (30 minutes a day, 5 days a week). This will help you control your weight and prevent disease.    Limit alcohol to one drink per day.    No smoking.     Wear sunscreen to prevent skin cancer.     See your dentist every six months for an exam and cleaning.    See your eye doctor every 1 to 2 years.            Follow-ups after your visit        Future tests that were ordered for you today     Open Future Orders        Priority Expected Expires Ordered    DEXA HIP/PELVIS/SPINE - Future Routine  4/18/2019 4/18/2018    MA SCREENING DIGITAL BILAT - Future  (s+30) Routine  4/18/2019 4/18/2018    Fecal colorectal cancer screen FIT - Future (S+30) Routine 5/9/2018 4/18/2019 4/18/2018            Who to contact     If you have questions or need follow up information about today's clinic visit or your schedule please contact [facility] directly at [phone].  Normal or non-critical lab and imaging results will be communicated to you by MyChart, letter or phone within 4 business days after the clinic has received the results. If you do not hear from us within 7 days, please contact the clinic through [email or web address] or phone. If you have a critical or abnormal lab result, we will notify you by phone as soon as possible.  Submit refill requests through [employer] or call your pharmacy and they will forward  "the refill request to us. Please allow 3 business days for your refill to be completed.          Additional Information About Your Visit        [email or web address] Information     [employer] gives you secure access to your electronic health record. If you see a primary care provider, you can also send messages to your care team and make appointments. If you have questions, please call your primary care clinic.  If you do not have a primary care provider, please call [phone] and they will assist you.        Care EveryWhere ID     This is your Care EveryWhere ID. This could be used by other organizations to access your [facility] medical records  [id number]        Your Vitals Were     Pulse Temperature Respirations Height Last Period Pulse Oximetry    48 96.6  F (35.9  C) 18 5' 7.5\" (1.715 m) 07/01/2002 99%    Breastfeeding? BMI (Body Mass Index)                No 42.34 kg/m2           Blood Pressure from Last 3 Encounters:   04/18/18 138/82   09/05/17 134/85   04/21/17 128/74    Weight from Last 3 Encounters:   04/18/18 274 lb 6.4 oz (124.5 kg)   09/05/17 264 lb (119.7 kg)   04/21/17 264 lb (119.7 kg)              We Performed the Following     CBC with platelets differential     Comprehensive metabolic panel (BMP + Alb, Alk Phos, ALT, AST, Total. Bili, TP)     DEPRESSION ACTION PLAN (DAP)     Lipid Profile     Vitamin D Deficiency          Today's Medication Changes          These changes are accurate as of 4/18/18  9:58 AM.  If you have any questions, ask your nurse or doctor.               These medicines have changed or have updated prescriptions.        Dose/Directions    amLODIPine 5 MG tablet   Commonly known as:  NORVASC   This may have changed:  See the new instructions.   Used for:  Essential hypertension, benign   Changed by:  Ami Augustine, APRN CNP        Dose:  7.5 mg   Take 1.5 tablets (7.5 mg) by mouth daily   Quantity:  135 tablet   Refills:  3       buPROPion 150 MG 24 hr tablet   Commonly known as:  " WELLBUTRIN XL   This may have changed:  See the new instructions.   Used for:  Anxiety   Changed by:  Ami Augustine APRN CNP        TAKE ONE TABLET BY MOUTH ONCE DAILY IN THE MORNING   Quantity:  90 tablet   Refills:  0       escitalopram 20 MG tablet   Commonly known as:  LEXAPRO   This may have changed:  See the new instructions.   Used for:  Anxiety   Changed by:  Ami Augustine APRN CNP        Dose:  20 mg   Take 1 tablet (20 mg) by mouth daily   Quantity:  60 tablet   Refills:  0       metoprolol succinate 25 MG 24 hr tablet   Commonly known as:  TOPROL-XL   This may have changed:  See the new instructions.   Used for:  Essential hypertension, benign   Changed by:  Ami Augustine APRN CNP        Dose:  25 mg   Take 1 tablet (25 mg) by mouth daily   Quantity:  90 tablet   Refills:  3       vitamin D 2000 units tablet   This may have changed:  when to take this   Used for:  Routine general medical examination at a health care facility        Dose:  2000 Units   Take 2,000 Units by mouth daily   Quantity:  100 tablet   Refills:  3            Where to get your medicines      These medications were sent to [facility] [address] [place] [address] [address] [place] [address]  [address] [facility] [address]     Phone:  [phone]     amLODIPine 5 MG tablet    buPROPion 150 MG 24 hr tablet    escitalopram 20 MG tablet    metoprolol succinate 25 MG 24 hr tablet    naltrexone 50 MG tablet                Primary Care Provider Office Phone # Fax #    Daya Debbie Bruno [phone]       [id number]  [address]   [facility] [address]        Equal Access to Services     [facility] AH: Hadii aad ku hadasho Soomaali, waaxda luqadaha, qaybta kaalmada adeegyada, waxay pino covarrubias. So [facility] [phone].    ATENCIÓN: Si habla español, tiene a escobar disposición servicios gratuitos de asistencia lingüística. Llame al [phone].    We comply with applicable [facility] civil  rights laws and [place] laws. We do not discriminate on the basis of race, color, national origin, age, disability, sex, sexual orientation, or gender identity.            Thank you!     Thank you for choosing [facility]  for your care. Our goal is always to provide you with excellent care. Hearing back from our patients is one way we can continue to improve our services. Please take a few minutes to complete the written survey that you may receive in the mail after your visit with us. Thank you!             Your Updated Medication List - Protect others around you: Learn how to safely use, store and throw away your medicines at www.disposemymeds.org.          This list is accurate as of 4/18/18  9:58 AM.  Always use your most recent med list.                   Brand Name Dispense Instructions for use Diagnosis    amLODIPine 5 MG tablet    NORVASC    135 tablet    Take 1.5 tablets (7.5 mg) by mouth daily    Essential hypertension, benign       aspirin 81 MG tablet     90 tablet    Take 1 tablet (81 mg) by mouth daily        buPROPion 150 MG 24 hr tablet    WELLBUTRIN XL    90 tablet    TAKE ONE TABLET BY MOUTH ONCE DAILY IN THE MORNING    Anxiety       escitalopram 20 MG tablet    LEXAPRO    60 tablet    Take 1 tablet (20 mg) by mouth daily    Anxiety       metoprolol succinate 25 MG 24 hr tablet    TOPROL-XL    90 tablet    Take 1 tablet (25 mg) by mouth daily    Essential hypertension, benign       naltrexone 50 MG tablet    DEPADE;REVIA    30 tablet    Take 1/2 tablet.  Time it one to two hours prior to worst cravings.  Then increase to one full tablet as instructed.    Morbid obesity, unspecified obesity type (H)       vitamin D 2000 units tablet     100 tablet    Take 2,000 Units by mouth daily    Routine general medical examination at a health care facility

## 2018-04-18 NOTE — PATIENT INSTRUCTIONS
Increase your Amlodipine to 7.5 mg daily (one and a half tablets)    Decrease the Metoprolol XL to 25 mg daily    Check your blood pressure weekly for the next month and let us know the results in a month    You are due for your mammogram, please schedule: [place]vc=[phone], [place]=[phone], [place]f=[phone], [facility] [phone] or Tkxjwip=[phone]  Complete and mail FIT (stool) test for colon cancer screening  Preventive Health Recommendations  Female Ages 50 - 64    Yearly exam: See your health care provider every year in order to  o Review health changes.   o Discuss preventive care.    o Review your medicines if your doctor has prescribed any.      Get a Pap test every three years (unless you have an abnormal result and your provider advises testing more often).    If you get Pap tests with HPV test, you only need to test every 5 years, unless you have an abnormal result.     You do not need a Pap test if your uterus was removed (hysterectomy) and you have not had cancer.    You should be tested each year for STDs (sexually transmitted diseases) if you're at risk.     Have a mammogram every 1 to 2 years.    Have a colonoscopy at age 50, or have a yearly FIT test (stool test). These exams screen for colon cancer.      Have a cholesterol test every 5 years, or more often if advised.    Have a diabetes test (fasting glucose) every three years. If you are at risk for diabetes, you should have this test more often.     If you are at risk for osteoporosis (brittle bone disease), think about having a bone density scan (DEXA).    Shots: Get a flu shot each year. Get a tetanus shot every 10 years.    Nutrition:     Eat at least 5 servings of fruits and vegetables each day.    Eat whole-grain bread, whole-wheat pasta and brown rice instead of white grains and rice.    Talk to your provider about Calcium and Vitamin D.     Lifestyle    Exercise at least 150 minutes a week (30 minutes a day, 5 days  a week). This will help you control your weight and prevent disease.    Limit alcohol to one drink per day.    No smoking.     Wear sunscreen to prevent skin cancer.     See your dentist every six months for an exam and cleaning.    See your eye doctor every 1 to 2 years.

## 2018-04-18 NOTE — PROGRESS NOTES
SUBJECTIVE:   CC: Donita Lerner is an 62 year old woman who presents for preventive health visit.     Healthy Habits:    Do you get at least three servings of calcium containing foods daily (dairy, green leafy vegetables, etc.)? no, taking calcium and/or vitamin D supplement: yes -     Amount of exercise or daily activities, outside of work: 0 day(s) per week    Problems taking medications regularly No    Medication side effects: No    Have you had an eye exam in the past two years? yes    Do you see a dentist twice per year? yes    Do you have sleep apnea, excessive snoring or daytime drowsiness?no    Hypertension  BP Readings from Last 6 Encounters:   04/27/18 128/86   04/23/18 132/90   04/18/18 138/82   09/05/17 134/85   04/21/17 128/74   11/10/16 134/86   Bradycardia.  Heart rate today 48.  September 2017 heart rate 54.  Patient on metoprolol      Morbid obesity  Has gained 10 pounds over the last 7 months.  Quite discouraged about this.  Has not been getting much exercise during the winter months.  Patient hoping to increase her exercise now with weather getting warmer  Patient interested in weight loss.    Anxiety with depression  Doing well.  Patient feels she is managing fine on current doses.      Vitamin D deficiency  Would like to have vitamin D level checked.    Health maintenance  Patient is due for the following health maintenance: DEXA scan, mammogram, colonoscopy or fit test.  She wishes to do the fit test.        Today's PHQ-2 Score:   PHQ-2 ( 1999 Pfizer) 4/18/2018 5/23/2016   Q1: Little interest or pleasure in doing things 1 0   Q2: Feeling down, depressed or hopeless 1 0   PHQ-2 Score 2 0       Abuse: Current or Past(Physical, Sexual or Emotional)-no do you feel safe in your environment - Yes    Social History   Substance Use Topics     Smoking status: Never Smoker     Smokeless tobacco: Never Used     Alcohol use No     If you drink alcohol do you typically have >3 drinks per day or >7  drinks per week? No                     Reviewed orders with patient.  Reviewed health maintenance and updated orders accordingly - Yes  Patient Active Problem List   Diagnosis     Generalized anxiety disorder     Symptomatic menopausal or female climacteric states     Sensorineural hearing loss     CNS DISORDER -gliosis on mri     S/P total knee arthroplasty     Advanced care planning/counseling discussion     Abnormal MRI of head     Essential hypertension      obesity, unspecified obesity type (HCC)     Major depressive disorder, single episode, mild (H)     S/P left unicompartmental knee replacement     Osteoporosis     Seasonal affective disorder (H)     Calculus of gallbladder without cholecystitis     Past Surgical History:   Procedure Laterality Date     ARTHROPLASTY KNEE  2013    Procedure: ARTHROPLASTY KNEE;  Right Total Knee Arthroplasty  ;  Surgeon: Ganesh Sheikh MD;  Location: [facility] OR     ARTHROPLASTY KNEE UNICOMPARTMENT Left 2016    Procedure: ARTHROPLASTY KNEE UNICOMPARTMENT;  Surgeon: Ganesh Sheikh MD;  Location: [facility] OR     C ANESTH, SECTION      x3       Social History   Substance Use Topics     Smoking status: Never Smoker     Smokeless tobacco: Never Used     Alcohol use No     Family History   Problem Relation Age of Onset     CANCER Mother      lymphoma     Depression Mother      CANCER Father      lung     Psychotic Disorder Daughter      borderline personality disorder     Depression Brother          Chief Complaint   Patient presents with     Physical     Weight Problem     would like to increase medication for weight problem             Pertinent mammograms are reviewed under the imaging tab.  History of abnormal Pap smear:   Last 3 Pap Results:   PAP (no units)   Date Value   2016 NIL   2015 UNSAT   2007 NIL       Reviewed and updated as needed this visit by clinical staff  Tobacco  Allergies  Meds  Problems         Reviewed and  "updated as needed this visit by Provider  Allergies  Meds  Problems            ROS:  CONSTITUTIONAL: NEGATIVE for fever, chills, change in weight  INTEGUMENTARY/SKIN: NEGATIVE for worrisome rashes, moles or lesions  EYES: NEGATIVE for vision changes or irritation  ENT: NEGATIVE for ear, mouth and throat problems  RESP: NEGATIVE for significant cough or SOB  BREAST: NEGATIVE for masses, tenderness or discharge  CV: NEGATIVE for chest pain, palpitations or peripheral edema  GI: abdominal pain RUQ and RLQ  : NEGATIVE for unusual urinary or vaginal symptoms. No vaginal bleeding.  MUSCULOSKELETAL: NEGATIVE for significant arthralgias or myalgia  NEURO: NEGATIVE for weakness, dizziness or paresthesias  PSYCHIATRIC: NEGATIVE for changes in mood or affect     OBJECTIVE:   /82  Pulse (!) 48  Temp 96.6  F (35.9  C)  Resp 18  Ht 5' 7.5\" (1.715 m)  Wt 274 lb 6.4 oz (124.5 kg)  LMP 07/01/2002  SpO2 99%  Breastfeeding? No  BMI 42.34 kg/m2  EXAM:  GENERAL APPEARANCE: healthy, alert, no distress and obese  EYES: Eyes grossly normal to inspection, PERRL and conjunctivae and sclerae normal  HENT: ear canals and TM's normal, nose and mouth without ulcers or lesions, oropharynx clear and oral mucous membranes moist  NECK: no adenopathy, no asymmetry, masses, or scars and thyroid normal to palpation  RESP: lungs clear to auscultation - no rales, rhonchi or wheezes  BREAST: normal without masses, tenderness or nipple discharge and no palpable axillary masses or adenopathy  CV: regular rate and rhythm, normal S1 S2, no S3 or S4, no murmur, click or rub, no peripheral edema and peripheral pulses strong  ABDOMEN: soft, no hepatosplenomegaly, no masses, bowel sounds normal  MS: no musculoskeletal defects are noted and gait is age appropriate without ataxia  SKIN: no suspicious lesions or rashes  NEURO: Normal strength and tone, sensory exam grossly normal, mentation intact and speech normal  PSYCH: mentation appears " normal and affect normal/bright    ASSESSMENT/PLAN:     ASSESSMENT:  1. Routine general medical examination at a health care facility    2. Morbid obesity, unspecified obesity type (H).  Discussed weight loss techniques including increased exercise, diet, support program such as Weight Watchers.  We also reviewed the weight management clinic if she would consider this if unable to lose weight with these other measures.   3. Essential hypertension, benign.  Heart rate is 48.  Will decrease metoprolol and increase amlodipine.  Monitor closely.   4. Anxiety.Stable.  No change in plan of care.   5. Screen for colon cancer.  Patient will complete FIT test   6. Asymptomatic postmenopausal status    7. Visit for screening mammogram    8. Vitamin D deficiency        PLAN:  Orders Placed This Encounter     DEPRESSION ACTION PLAN (DAP)     DEXA HIP/PELVIS/SPINE - Future     MA SCREENING DIGITAL BILAT - Future  (s+30)     Fecal colorectal cancer screen FIT - Future (S+30)     Comprehensive metabolic panel (BMP + Alb, Alk Phos, ALT, AST, Total. Bili, TP)     Lipid Profile     Vitamin D Deficiency     CBC with platelets differential     amLODIPine (NORVASC) 5 MG tablet     buPROPion (WELLBUTRIN XL) 150 MG 24 hr tablet     escitalopram (LEXAPRO) 20 MG tablet     metoprolol succinate (TOPROL-XL) 25 MG 24 hr tablet     naltrexone (DEPADE;REVIA) 50 MG tablet       Patient Instructions   Increase your Amlodipine to 7.5 mg daily (one and a half tablets)    Decrease the Metoprolol XL to 25 mg daily    Check your blood pressure weekly for the next month and let us know the results in a month    You are due for your mammogram, please schedule: [place]Xhar=[phone], [place]=[phone], [place]zu=[phone], [facility] [phone] or Zrjlhsa=[phone]  Complete and mail FIT (stool) test for colon cancer screening  Preventive Health Recommendations  Female Ages 50 - 64    Yearly exam: See your health care provider every year in  order to  o Review health changes.   o Discuss preventive care.    o Review your medicines if your doctor has prescribed any.      Get a Pap test every three years (unless you have an abnormal result and your provider advises testing more often).    If you get Pap tests with HPV test, you only need to test every 5 years, unless you have an abnormal result.     You do not need a Pap test if your uterus was removed (hysterectomy) and you have not had cancer.    You should be tested each year for STDs (sexually transmitted diseases) if you're at risk.     Have a mammogram every 1 to 2 years.    Have a colonoscopy at age 50, or have a yearly FIT test (stool test). These exams screen for colon cancer.      Have a cholesterol test every 5 years, or more often if advised.    Have a diabetes test (fasting glucose) every three years. If you are at risk for diabetes, you should have this test more often.     If you are at risk for osteoporosis (brittle bone disease), think about having a bone density scan (DEXA).    Shots: Get a flu shot each year. Get a tetanus shot every 10 years.    Nutrition:     Eat at least 5 servings of fruits and vegetables each day.    Eat whole-grain bread, whole-wheat pasta and brown rice instead of white grains and rice.    Talk to your provider about Calcium and Vitamin D.     Lifestyle    Exercise at least 150 minutes a week (30 minutes a day, 5 days a week). This will help you control your weight and prevent disease.    Limit alcohol to one drink per day.    No smoking.     Wear sunscreen to prevent skin cancer.     See your dentist every six months for an exam and cleaning.    See your eye doctor every 1 to 2 years.        COUNSELING:   Reviewed preventive health counseling, as reflected in patient instructions       Regular exercise       Healthy diet/nutrition       Osteoporosis Prevention/Bone Health         reports that she has never smoked. She has never used smokeless  "tobacco.    Estimated body mass index is 42.34 kg/(m^2) as calculated from the following:    Height as of this encounter: 5' 7.5\" (1.715 m).    Weight as of this encounter: 274 lb 6.4 oz (124.5 kg).   Weight management plan: Discussed healthy diet and exercise guidelines and patient will follow up in 6 months in clinic to re-evaluate.    Counseling Resources:  ATP IV Guidelines  Pooled Cohorts Equation Calculator  Breast Cancer Risk Calculator  FRAX Risk Assessment  ICSI Preventive Guidelines  Dietary Guidelines for Americans, 2010  USDA's MyPlate  ASA Prophylaxis  Lung CA Screening    Ami Augustine, TODD, APRN [facility]  "

## 2018-04-18 NOTE — NURSING NOTE
"Chief Complaint   Patient presents with     Physical       Initial LMP 07/01/2002  Breastfeeding? No Estimated body mass index is 40.14 kg/(m^2) as calculated from the following:    Height as of 9/5/17: 5' 8\" (1.727 m).    Weight as of 9/5/17: 264 lb (119.7 kg).      Health Maintenance that is potentially due pending provider review:  Mammogram and Colonoscopy/FIT    Gave pt phone number/pended order to schedule mammo and/or colonoscopy(or FIT)        "

## 2018-04-18 NOTE — LETTER
My Depression Action Plan  Name: Donita Lerner   Date of Birth 1955  Date: 4/18/2018    My doctor: Daya Bruno   My clinic: [facility]  [address] [facility] [address]  [phone]          GREEN    ZONE   Good Control    What it looks like:     Things are going generally well. You have normal up s and down s. You may even feel depressed from time to time, but bad moods usually last less than a day.   What you need to do:  1. Continue to care for yourself (see self care plan)  2. Check your depression survival kit and update it as needed  3. Follow your physician s recommendations including any medication.  4. Do not stop taking medication unless you consult with your physician first.           YELLOW         ZONE Getting Worse    What it looks like:     Depression is starting to interfere with your life.     It may be hard to get out of bed; you may be starting to isolate yourself from others.    Symptoms of depression are starting to last most all day and this has happened for several days.     You may have suicidal thoughts but they are not constant.   What you need to do:     1. Call your care team, your response to treatment will improve if you keep your care team informed of your progress. Yellow periods are signs an adjustment may need to be made.     2. Continue your self-care, even if you have to fake it!    3. Talk to someone in your support network    4. Open up your depression survival kit           RED    ZONE Medical Alert - Get Help    What it looks like:     Depression is seriously interfering with your life.     You may experience these or other symptoms: You can t get out of bed most days, can t work or engage in other necessary activities, you have trouble taking care of basic hygiene, or basic responsibilities, thoughts of suicide or death that will not go away, self-injurious behavior.     What you need to do:  1. Call your care team and  request a same-day appointment. If they are not available (weekends or after hours) call your local crisis line, emergency room or 911.            Depression Self Care Plan / Survival Kit    Self-Care for Depression  Here s the deal. Your body and mind are really not as separate as most people think.  What you do and think affects how you feel and how you feel influences what you do and think. This means if you do things that people who feel good do, it will help you feel better.  Sometimes this is all it takes.  There is also a place for medication and therapy depending on how severe your depression is, so be sure to consult with your medical provider and/ or Behavioral Health Consultant if your symptoms are worsening or not improving.     In order to better manage my stress, I will:    Exercise  Get some form of exercise, every day. This will help reduce pain and release endorphins, the  feel good  chemicals in your brain. This is almost as good as taking antidepressants!  This is not the same as joining a gym and then never going! (they count on that by the way ) It can be as simple as just going for a walk or doing some gardening, anything that will get you moving.      Hygiene   Maintain good hygiene (Get out of bed in the morning, Make your bed, Brush your teeth, Take a shower, and Get dressed like you were going to work, even if you are unemployed).  If your clothes don't fit try to get ones that do.    Diet  I will strive to eat foods that are good for me, drink plenty of water, and avoid excessive sugar, caffeine, alcohol, and other mood-altering substances.  Some foods that are helpful in depression are: complex carbohydrates, B vitamins, flaxseed, fish or fish oil, fresh fruits and vegetables.    Psychotherapy  I agree to participate in Individual Therapy (if recommended).    Medication  If prescribed medications, I agree to take them.  Missing doses can result in serious side effects.  I understand that  drinking alcohol, or other illicit drug use, may cause potential side effects.  I will not stop my medication abruptly without first discussing it with my provider.    Staying Connected With Others  I will stay in touch with my friends, family members, and my primary care provider/team.    Use your imagination  Be creative.  We all have a creative side; it doesn t matter if it s oil painting, sand castles, or mud pies! This will also kick up the endorphins.    Witness Beauty  (AKA stop and smell the roses) Take a look outside, even in mid-winter. Notice colors, textures. Watch the squirrels and birds.     Service to others  Be of service to others.  There is always someone else in need.  By helping others we can  get out of ourselves  and remember the really important things.  This also provides opportunities for practicing all the other parts of the program.    Humor  Laugh and be silly!  Adjust your TV habits for less news and crime-drama and more comedy.    Control your stress  Try breathing deep, massage therapy, biofeedback, and meditation. Find time to relax each day.     My support system    Clinic Contact:  Phone number:    Contact 1:  Phone number:    Contact 2:  Phone number:    Mandaeism/:  Phone number:    Therapist:  Phone number:    Local crisis center:    Phone number:    Other community support:  Phone number:

## 2018-04-19 ASSESSMENT — ANXIETY QUESTIONNAIRES: GAD7 TOTAL SCORE: 2

## 2018-04-19 ASSESSMENT — PATIENT HEALTH QUESTIONNAIRE - PHQ9: SUM OF ALL RESPONSES TO PHQ QUESTIONS 1-9: 8

## 2018-04-23 ENCOUNTER — ALLIED HEALTH/NURSE VISIT (OUTPATIENT)
Dept: FAMILY MEDICINE | Facility: CLINIC | Age: 63
End: 2018-04-23
Payer: COMMERCIAL

## 2018-04-23 VITALS — DIASTOLIC BLOOD PRESSURE: 90 MMHG | HEART RATE: 56 BPM | SYSTOLIC BLOOD PRESSURE: 132 MMHG

## 2018-04-23 DIAGNOSIS — I10 ESSENTIAL HYPERTENSION: Primary | ICD-10-CM

## 2018-04-23 PROCEDURE — 99207 ZZC NO CHARGE NURSE ONLY: CPT

## 2018-04-23 NOTE — MR AVS SNAPSHOT
After Visit Summary   4/23/2018    Donita Lerner    MRN: [id number]           Patient Information     Date Of Birth          1955        Visit Information        Provider Department      4/23/2018 4:00 PM FL RC RN [facility]        Today's Diagnoses     Essential hypertension    -  1       Follow-ups after your visit        Your next 10 appointments already scheduled     Apr 30, 2018  4:00 PM CDT   Nurse Only with FL RC RN   [facility] ([facility])    [address] [place] [id number] [phone]            May 02, 2018  2:45 PM CDT   (Arrive by 2:30 PM)   MA SCREENING DIGITAL BILATERAL with NBMA1   [facility] ([facility])    [address] [place] [id number] [phone]           Do not use any powder, lotion or deodorant under your arms or on your breast. If you do, we will ask you to remove it before your exam.  Wear comfortable, two-piece clothing.  If you have any allergies, tell your care team.  Bring any previous mammograms from other facilities or have them mailed to the breast center.            May 07, 2018  4:00 PM CDT   Nurse Only with FL RC RN   [facility] ([facility])    [address] [place] [id number] [phone]            May 14, 2018  4:00 PM CDT   Nurse Only with FL RC RN   [facility] ([facility])    [address] [place] [id number] [phone]              Who to contact     If you have questions or need follow up information about today's clinic visit or your schedule please contact [facility] directly at [phone].  Normal or non-critical lab and imaging results will be communicated to you by MyChart, letter or phone within 4 business days after the clinic has received the results. If you do not hear from us within 7 days, please contact the clinic through MyChart or  phone. If you have a critical or abnormal lab result, we will notify you by phone as soon as possible.  Submit refill requests through [employer] or call your pharmacy and they will forward the refill request to us. Please allow 3 business days for your refill to be completed.          Additional Information About Your Visit        [employer] Information     [employer] gives you secure access to your electronic health record. If you see a primary care provider, you can also send messages to your care team and make appointments. If you have questions, please call your primary care clinic.  If you do not have a primary care provider, please call [phone] and they will assist you.        Care EveryWhere ID     This is your Care EveryWhere ID. This could be used by other organizations to access your [place] medical records  [id number]        Your Vitals Were     Pulse Last Period                56 07/01/2002           Blood Pressure from Last 3 Encounters:   04/23/18 132/90   04/18/18 138/82   09/05/17 134/85    Weight from Last 3 Encounters:   04/18/18 274 lb 6.4 oz (124.5 kg)   09/05/17 264 lb (119.7 kg)   04/21/17 264 lb (119.7 kg)              Today, you had the following     No orders found for display         Today's Medication Changes          These changes are accurate as of 4/23/18  4:27 PM.  If you have any questions, ask your nurse or doctor.               These medicines have changed or have updated prescriptions.        Dose/Directions    vitamin D 2000 units tablet   This may have changed:  when to take this   Used for:  Routine general medical examination at a health care facility        Dose:  2000 Units   Take 2,000 Units by mouth daily   Quantity:  100 tablet   Refills:  3                Primary Care Provider Office Phone # Fax #    Daya Bruno [phone]       [id number]  [address]   [facility][1] [id number]        Equal Access to Services     JEAN-PAUL KOVACS AH: Liliane garcia  Sofrancesco, wacoltonda luqadaha, qaybta kaalmada marisabel, sonia covarrubias. So [facility] [phone].    ATENCIÓN: Si elicia cook, tiene a escobar disposición servicios gratuitos de asistencia lingüística. Shanda price [phone].    We comply with applicable federal civil rights laws and [place] laws. We do not discriminate on the basis of race, color, national origin, age, disability, sex, sexual orientation, or gender identity.            Thank you!     Thank you for choosing [facility]  for your care. Our goal is always to provide you with excellent care. Hearing back from our patients is one way we can continue to improve our services. Please take a few minutes to complete the written survey that you may receive in the mail after your visit with us. Thank you!             Your Updated Medication List - Protect others around you: Learn how to safely use, store and throw away your medicines at www.disposemymeds.org.          This list is accurate as of 4/23/18  4:27 PM.  Always use your most recent med list.                   Brand Name Dispense Instructions for use Diagnosis    amLODIPine 5 MG tablet    NORVASC    135 tablet    Take 1.5 tablets (7.5 mg) by mouth daily    Essential hypertension, benign       aspirin 81 MG tablet     90 tablet    Take 1 tablet (81 mg) by mouth daily        buPROPion 150 MG 24 hr tablet    WELLBUTRIN XL    90 tablet    TAKE ONE TABLET BY MOUTH ONCE DAILY IN THE MORNING    Anxiety       escitalopram 20 MG tablet    LEXAPRO    60 tablet    Take 1 tablet (20 mg) by mouth daily    Anxiety       metoprolol succinate 25 MG 24 hr tablet    TOPROL-XL    90 tablet    Take 1 tablet (25 mg) by mouth daily    Essential hypertension, benign       naltrexone 50 MG tablet    DEPADE;REVIA    30 tablet    Take 1/2 tablet.  Time it one to two hours prior to worst cravings.  Then increase to one full tablet as instructed.    Morbid obesity, unspecified obesity type (H)        vitamin D 2000 units tablet     100 tablet    Take 2,000 Units by mouth daily    Routine general medical examination at a health care facility

## 2018-04-23 NOTE — PROGRESS NOTES
Increase your Amlodipine to 7.5 mg daily (one and a half tablets)     Decrease the Metoprolol XL to 25 mg daily      Pt has changed dosages as noted above x 5 days.  Pt has appointments scheduled for the next 4 mondays to have this rechecked.

## 2018-04-26 ENCOUNTER — TELEPHONE (OUTPATIENT)
Dept: FAMILY MEDICINE | Facility: CLINIC | Age: 63
End: 2018-04-26

## 2018-04-26 NOTE — TELEPHONE ENCOUNTER
Patient is calling requesting to speak with Yolanda Augustine's nurse as she is having severe pain in her stomach and her back. She is wondering about her gallbladder. Please advise.    Cristiana [facility]-Station

## 2018-04-26 NOTE — TELEPHONE ENCOUNTER
Pt states she is having pain in stomach and back. States she ate stir jimenez and 1 1/2 hours later developed pain in back and stomach. States the pain is gradually getting better after several hours. States she thinks she is having gall bladder issues. Appointment made for tomorrow with Yolanda Augustine. Lennie Paiz RN

## 2018-04-27 ENCOUNTER — OFFICE VISIT (OUTPATIENT)
Dept: FAMILY MEDICINE | Facility: CLINIC | Age: 63
End: 2018-04-27
Payer: COMMERCIAL

## 2018-04-27 VITALS
DIASTOLIC BLOOD PRESSURE: 86 MMHG | SYSTOLIC BLOOD PRESSURE: 128 MMHG | BODY MASS INDEX: 40.47 KG/M2 | HEART RATE: 62 BPM | WEIGHT: 267 LBS | TEMPERATURE: 96.5 F | HEIGHT: 68 IN | OXYGEN SATURATION: 96 %

## 2018-04-27 DIAGNOSIS — R10.13 ABDOMINAL PAIN, EPIGASTRIC: Primary | ICD-10-CM

## 2018-04-27 LAB
ALBUMIN SERPL-MCNC: 4.1 G/DL (ref 3.4–5)
ALP SERPL-CCNC: 228 U/L (ref 40–150)
ALT SERPL W P-5'-P-CCNC: 418 U/L (ref 0–50)
ANION GAP SERPL CALCULATED.3IONS-SCNC: 4 MMOL/L (ref 3–14)
AST SERPL W P-5'-P-CCNC: 346 U/L (ref 0–45)
BASOPHILS # BLD AUTO: 0 10E9/L (ref 0–0.2)
BASOPHILS NFR BLD AUTO: 0.3 %
BILIRUB SERPL-MCNC: 1.1 MG/DL (ref 0.2–1.3)
BUN SERPL-MCNC: 18 MG/DL (ref 7–30)
CALCIUM SERPL-MCNC: 9 MG/DL (ref 8.5–10.1)
CHLORIDE SERPL-SCNC: 105 MMOL/L (ref 94–109)
CO2 SERPL-SCNC: 28 MMOL/L (ref 20–32)
CREAT SERPL-MCNC: 0.78 MG/DL (ref 0.52–1.04)
DIFFERENTIAL METHOD BLD: ABNORMAL
EOSINOPHIL # BLD AUTO: 0.1 10E9/L (ref 0–0.7)
EOSINOPHIL NFR BLD AUTO: 0.9 %
ERYTHROCYTE [DISTWIDTH] IN BLOOD BY AUTOMATED COUNT: 13.8 % (ref 10–15)
GFR SERPL CREATININE-BSD FRML MDRD: 74 ML/MIN/1.7M2
GLUCOSE SERPL-MCNC: 91 MG/DL (ref 70–99)
HCT VFR BLD AUTO: 48.4 % (ref 35–47)
HGB BLD-MCNC: 15.9 G/DL (ref 11.7–15.7)
LIPASE SERPL-CCNC: 152 U/L (ref 73–393)
LYMPHOCYTES # BLD AUTO: 1.1 10E9/L (ref 0.8–5.3)
LYMPHOCYTES NFR BLD AUTO: 17.7 %
MCH RBC QN AUTO: 29.9 PG (ref 26.5–33)
MCHC RBC AUTO-ENTMCNC: 32.9 G/DL (ref 31.5–36.5)
MCV RBC AUTO: 91 FL (ref 78–100)
MONOCYTES # BLD AUTO: 0.4 10E9/L (ref 0–1.3)
MONOCYTES NFR BLD AUTO: 6.9 %
NEUTROPHILS # BLD AUTO: 4.7 10E9/L (ref 1.6–8.3)
NEUTROPHILS NFR BLD AUTO: 74.2 %
PLATELET # BLD AUTO: 224 10E9/L (ref 150–450)
POTASSIUM SERPL-SCNC: 4.7 MMOL/L (ref 3.4–5.3)
PROT SERPL-MCNC: 8.1 G/DL (ref 6.8–8.8)
RBC # BLD AUTO: 5.32 10E12/L (ref 3.8–5.2)
SODIUM SERPL-SCNC: 137 MMOL/L (ref 133–144)
WBC # BLD AUTO: 6.4 10E9/L (ref 4–11)

## 2018-04-27 PROCEDURE — 99214 OFFICE O/P EST MOD 30 MIN: CPT | Performed by: NURSE PRACTITIONER

## 2018-04-27 PROCEDURE — 83690 ASSAY OF LIPASE: CPT | Performed by: NURSE PRACTITIONER

## 2018-04-27 PROCEDURE — 36415 COLL VENOUS BLD VENIPUNCTURE: CPT | Performed by: NURSE PRACTITIONER

## 2018-04-27 PROCEDURE — 80053 COMPREHEN METABOLIC PANEL: CPT | Performed by: NURSE PRACTITIONER

## 2018-04-27 PROCEDURE — 85025 COMPLETE CBC W/AUTO DIFF WBC: CPT | Performed by: NURSE PRACTITIONER

## 2018-04-27 NOTE — PATIENT INSTRUCTIONS
We will call you with the results of your labs     If you have increased abdominal pain or fever, then go to the ER.    Schedule the ultrasound

## 2018-04-27 NOTE — MR AVS SNAPSHOT
After Visit Summary   4/27/2018    Donita Lerner    MRN: [id number]           Patient Information     Date Of Birth          1955        Visit Information        Provider Department      4/27/2018 8:40 AM Ami Augustine APRN CNP [facility]        Today's Diagnoses     Abdominal pain, epigastric    -  1      Care Instructions    We will call you with the results of your labs     If you have increased abdominal pain or fever, then go to the ER.    Schedule the ultrasound          Follow-ups after your visit        Your next 10 appointments already scheduled     Apr 30, 2018  4:00 PM CDT   Nurse Only with FL RAY RN   [facility] ([facility]    [address] [place] [id number] [phone]            May 02, 2018  2:45 PM CDT   (Arrive by 2:30 PM)   MA SCREENING DIGITAL BILATERAL with NBMA1   [facility] ([facility])    [address] [place] [phone]   [phone]           Do not use any powder, lotion or deodorant under your arms or on your breast. If you do, we will ask you to remove it before your exam.  Wear comfortable, two-piece clothing.  If you have any allergies, tell your care team.  Bring any previous mammograms from other facilities or have them mailed to the breast center.            May 07, 2018  4:00 PM CDT   Nurse Only with FL RAY RN   [facility] ([facility])    [address] [place] [id number] [phone]            May 14, 2018  4:00 PM CDT   Nurse Only with FL RAY RN   [facility] ([facility])    [address] [place] [id number] [phone]              Who to contact     If you have questions or need follow up information about today's clinic visit or your schedule please contact [facility] directly at [phone].  Normal or non-critical lab and imaging results will be  "communicated to you by [employer], letter or phone within 4 business days after the clinic has received the results. If you do not hear from us within 7 days, please contact the clinic through [employer] or phone. If you have a critical or abnormal lab result, we will notify you by phone as soon as possible.  Submit refill requests through [employer] or call your pharmacy and they will forward the refill request to us. Please allow 3 business days for your refill to be completed.          Additional Information About Your Visit        [employer] Information     [employer] gives you secure access to your electronic health record. If you see a primary care provider, you can also send messages to your care team and make appointments. If you have questions, please call your primary care clinic.  If you do not have a primary care provider, please call [phone] and they will assist you.        Care EveryWhere ID     This is your Care EveryWhere ID. This could be used by other organizations to access your [place] medical records  [id number]        Your Vitals Were     Pulse Temperature Height Last Period Pulse Oximetry BMI (Body Mass Index)    62 96.5  F (35.8  C) (Tympanic) 5' 7.5\" (1.715 m) 07/01/2002 96% 41.2 kg/m2       Blood Pressure from Last 3 Encounters:   04/27/18 128/86   04/23/18 132/90   04/18/18 138/82    Weight from Last 3 Encounters:   04/27/18 267 lb (121.1 kg)   04/18/18 274 lb 6.4 oz (124.5 kg)   09/05/17 264 lb (119.7 kg)              We Performed the Following     CBC with platelets and differential     Comprehensive metabolic panel (BMP + Alb, Alk Phos, ALT, AST, Total. Bili, TP)     Lipase          Today's Medication Changes          These changes are accurate as of 4/27/18  9:35 AM.  If you have any questions, ask your nurse or doctor.               These medicines have changed or have updated prescriptions.        Dose/Directions    vitamin D 2000 units tablet   This may have changed:  when to take this "   Used for:  Routine general medical examination at a health care facility        Dose:  2000 Units   Take 2,000 Units by mouth daily   Quantity:  100 tablet   Refills:  3                Primary Care Provider Office Phone # Fax #    Daya Debbie Bruno [phone]       [id number]  [address]   [facility] [address]        Equal Access to Services     [facility]: Hadii aad ku hadasho Soomaali, waaxda luqadaha, qaybta kaalmada adeegyada, waxay idiin hayaan adeeg kharash la'aan . So [facility] [phone].    ATENCIÓN: Si habla español, tiene a escobar disposición servicios gratuitos de asistencia lingüística. Llame al [phone].    We comply with applicable federal civil rights laws and [place] laws. We do not discriminate on the basis of race, color, national origin, age, disability, sex, sexual orientation, or gender identity.            Thank you!     Thank you for choosing [facility]  for your care. Our goal is always to provide you with excellent care. Hearing back from our patients is one way we can continue to improve our services. Please take a few minutes to complete the written survey that you may receive in the mail after your visit with us. Thank you!             Your Updated Medication List - Protect others around you: Learn how to safely use, store and throw away your medicines at www.disposemymeds.org.          This list is accurate as of 4/27/18  9:35 AM.  Always use your most recent med list.                   Brand Name Dispense Instructions for use Diagnosis    amLODIPine 5 MG tablet    NORVASC    135 tablet    Take 1.5 tablets (7.5 mg) by mouth daily    Essential hypertension, benign       aspirin 81 MG tablet     90 tablet    Take 1 tablet (81 mg) by mouth daily        buPROPion 150 MG 24 hr tablet    WELLBUTRIN XL    90 tablet    TAKE ONE TABLET BY MOUTH ONCE DAILY IN THE MORNING    Anxiety       escitalopram 20 MG tablet    LEXAPRO    60 tablet    Take 1 tablet  (20 mg) by mouth daily    Anxiety       metoprolol succinate 25 MG 24 hr tablet    TOPROL-XL    90 tablet    Take 1 tablet (25 mg) by mouth daily    Essential hypertension, benign       naltrexone 50 MG tablet    DEPADE;REVIA    30 tablet    Take 1/2 tablet.  Time it one to two hours prior to worst cravings.  Then increase to one full tablet as instructed.    Morbid obesity, unspecified obesity type (H)       vitamin D 2000 units tablet     100 tablet    Take 2,000 Units by mouth daily    Routine general medical examination at a health care facility

## 2018-04-27 NOTE — PROGRESS NOTES
SUBJECTIVE:   Donita Lerner is a 62 year old female who presents to clinic today for the following health issues:      Abdominal Pain      Duration: Started yesterday afternoon.    Description (location/character/radiation): right in the center of abdomen       Associated flank pain: pain radiates from side to back     Intensity:  moderate, 7/10    Accompanying signs and symptoms:        Fever/Chills: no        Gas/Bloating: YES- gas and bloating       Nausea/vomitting: YES- nausea       Diarrhea: YES       Dysuria or Hematuria: no     History (previous similar pain/trauma/previous testing): concerned about family history of gall bladder issues    Precipitating or alleviating factors:       Pain worse with eating/BM/urination: cannot eat when this happens        Pain relieved by BM: YES- a little     Therapies tried and outcome: None    LMP:  not applicable      Abdominal pain started yesterday afternoon and has persisted almost continuously.  Pain initially in the epigastric region, moved to the right side and around to the right flank.  Developed heartburn as well  Had chicken stir jimenez night before.  Has been eating a lot of eggs.  Better now with watching foods and staying away from cheeses and greasy foods over the past week since her physical a week ago.      Problem list and histories reviewed & adjusted, as indicated.  Additional history: as documented    Patient Active Problem List   Diagnosis     Generalized anxiety disorder     Symptomatic menopausal or female climacteric states     Sensorineural hearing loss     CNS DISORDER -gliosis on mri     S/P total knee arthroplasty     Advanced care planning/counseling discussion     Abnormal MRI of head     Essential hypertension      obesity, unspecified obesity type (HCC)     Major depressive disorder, single episode, mild (H)     S/P left unicompartmental knee replacement     Osteoporosis     Seasonal affective disorder (H)     Calculus of gallbladder  without cholecystitis     Past Surgical History:   Procedure Laterality Date     ARTHROPLASTY KNEE  2013    Procedure: ARTHROPLASTY KNEE;  Right Total Knee Arthroplasty  ;  Surgeon: Ganesh Sheikh MD;  Location: [facility] OR     ARTHROPLASTY KNEE UNICOMPARTMENT Left 2016    Procedure: ARTHROPLASTY KNEE UNICOMPARTMENT;  Surgeon: Ganesh Sheikh MD;  Location: [facility] OR     C ANESTH, SECTION      x3       Social History   Substance Use Topics     Smoking status: Never Smoker     Smokeless tobacco: Never Used     Alcohol use No     Family History   Problem Relation Age of Onset     CANCER Mother      lymphoma     Depression Mother      CANCER Father      lung     Psychotic Disorder Daughter      borderline personality disorder     Depression Brother          Current Outpatient Prescriptions   Medication Sig Dispense Refill     amLODIPine (NORVASC) 5 MG tablet Take 1.5 tablets (7.5 mg) by mouth daily 135 tablet 3     aspirin 81 MG tablet Take 1 tablet (81 mg) by mouth daily 90 tablet 3     buPROPion (WELLBUTRIN XL) 150 MG 24 hr tablet TAKE ONE TABLET BY MOUTH ONCE DAILY IN THE MORNING 90 tablet 0     Cholecalciferol (VITAMIN D) 2000 UNITS tablet Take 2,000 Units by mouth daily (Patient taking differently: Take 2,000 Units by mouth 2 times daily ) 100 tablet 3     escitalopram (LEXAPRO) 20 MG tablet Take 1 tablet (20 mg) by mouth daily 60 tablet 0     metoprolol succinate (TOPROL-XL) 25 MG 24 hr tablet Take 1 tablet (25 mg) by mouth daily 90 tablet 3     naltrexone (DEPADE;REVIA) 50 MG tablet Take 1/2 tablet.  Time it one to two hours prior to worst cravings.  Then increase to one full tablet as instructed. 30 tablet 3       Reviewed and updated as needed this visit by clinical staff  Tobacco  Allergies  Meds  Problems  Med Hx  Surg Hx  Fam Hx  Soc Hx        Reviewed and updated as needed this visit by Provider  Allergies  Meds  Problems         ROS:  Constitutional, HEENT,  "cardiovascular, pulmonary, gi and gu systems are negative, except as otherwise noted.    OBJECTIVE:     /86  Pulse 62  Temp 96.5  F (35.8  C) (Tympanic)  Ht 5' 7.5\" (1.715 m)  Wt 267 lb (121.1 kg)  LMP 07/01/2002  SpO2 96%  BMI 41.2 kg/m2  Body mass index is 41.2 kg/(m^2).       GENERAL: healthy, alert, no distress and obese  EYES: Eyes grossly normal to inspection, PERRL and conjunctivae and sclerae normal  HENT: ear canals and TM's normal, nose and mouth without ulcers or lesions  NECK: no adenopathy, no asymmetry, masses, or scars and thyroid normal to palpation  RESP: lungs clear to auscultation - no rales, rhonchi or wheezes  CV: regular rate and rhythm, normal S1 S2, no S3 or S4, no murmur, click or rub, no peripheral edema and peripheral pulses strong  ABDOMEN: tender to palpation right upper and right lower quadrants, no rebound tenderness or guarding.  Bowel sounds normal.  Abdomen soft.  No masses noted.  MS: no gross musculoskeletal defects noted, no edema  PSYCH: mentation appears normal, affect normal/bright    Diagnostic Test Results:    4/27/18  9:43 AM     Component Results   Component Value Flag Ref Range Units Status Collected Lab   Sodium 137  133 - 144 mmol/L Final 04/27/2018  9:43 AM 59   Potassium 4.7  3.4 - 5.3 mmol/L Final 04/27/2018  9:43 AM 59   Chloride 105  94 - 109 mmol/L Final 04/27/2018  9:43 AM 59   Carbon Dioxide 28  20 - 32 mmol/L Final 04/27/2018  9:43 AM 59   Anion Gap 4  3 - 14 mmol/L Final 04/27/2018  9:43 AM 59   Glucose 91  70 - 99 mg/dL Final 04/27/2018  9:43 AM 59   Urea Nitrogen 18  7 - 30 mg/dL Final 04/27/2018  9:43 AM 59   Creatinine 0.78  0.52 - 1.04 mg/dL Final 04/27/2018  9:43 AM 59   GFR Estimate 74  >60 mL/min/1.7m2 Final 04/27/2018  9:43 AM 59   Comment:   Non  GFR Calc   GFR Estimate If Black 90  >60 mL/min/1.7m2 Final 04/27/2018  9:43 AM 59   Comment:   African American GFR Calc   Calcium 9.0  8.5 - 10.1 mg/dL Final 04/27/2018 "  9:43 AM 59   Bilirubin Total 1.1  0.2 - 1.3 mg/dL Final 04/27/2018  9:43 AM 59   Albumin 4.1  3.4 - 5.0 g/dL Final 04/27/2018  9:43 AM 59   Protein Total 8.1  6.8 - 8.8 g/dL Final 04/27/2018  9:43 AM 59   Alkaline Phosphatase 228 (H) 40 - 150 U/L Final 04/27/2018  9:43 AM 59    (H) 0 - 50 U/L Final 04/27/2018  9:43 AM 59    (H) 0 - 45 U/L Final 04/27/2018  9:43 AM      Exam Date Exam Time Accession # Results    4/27/18  9:43 AM     Component Results   Component Value Flag Ref Range Units Status Collected Lab   Lipase 152  73 - 393 U/L Final 04/27/2018  9:43 AM 59     4/27/18  9:43 AM     Component Results   Component Value Flag Ref Range Units Status Collected Lab   WBC 6.4  4.0 - 11.0 10e9/L Final 04/27/2018  9:43 AM 63   RBC Count 5.32 (H) 3.8 - 5.2 10e12/L Final 04/27/2018  9:43 AM 63   Hemoglobin 15.9 (H) 11.7 - 15.7 g/dL Final 04/27/2018  9:43 AM 63   Hematocrit 48.4 (H) 35.0 - 47.0 % Final 04/27/2018  9:43 AM 63   MCV 91  78 - 100 fl Final 04/27/2018  9:43 AM 63   MCH 29.9  26.5 - 33.0 pg Final 04/27/2018  9:43 AM 63   MCHC 32.9  31.5 - 36.5 g/dL Final 04/27/2018  9:43 AM 63   RDW 13.8  10.0 - 15.0 % Final 04/27/2018  9:43 AM 63   Platelet Count 224  150 - 450 10e9/L Final 04/27/2018  9:43 AM 63   Diff Method     Final 04/27/2018  9:43 AM 63   Automated Method   % Neutrophils 74.2   % Final 04/27/2018  9:43 AM 63   % Lymphocytes 17.7   % Final 04/27/2018  9:43 AM 63   % Monocytes 6.9   % Final 04/27/2018  9:43 AM 63   % Eosinophils 0.9   % Final 04/27/2018  9:43 AM 63   % Basophils 0.3   % Final 04/27/2018  9:43 AM 63   Absolute Neutrophil 4.7  1.6 - 8.3 10e9/L Final 04/27/2018  9:43 AM 63   Absolute Lymphocytes 1.1  0.8 - 5.3 10e9/L Final 04/27/2018  9:43 AM 63   Absolute Monocytes 0.4  0.0 - 1.3 10e9/L Final 04/27/2018  9:43 AM 63   Absolute Eosinophils 0.1  0.0 - 0.7 10e9/L Final 04/27/2018  9:43 AM 63   Absolute Basophils 0.0  0.0 - 0.2 10e9/L Final 04/27/2018  9:43 AM  63         ASSESSMENT/PLAN:     ASSESSMENT:  1. Abdominal pain, epigastric.  Will do labs and ultrasound.  CBC results available at visits- WBC is normal.  Discussed with patient that if she should develop a fever or increased pain, she should go to the emergency room.     Addendum: Metabolic panel available later in the day and reviewed: alk phosphatase, ALT and AST significantly elevated.  Suspect acute process is going on.  Spoke with patient and reviewed this information with her.  She has her ultrasound scheduled for Monday.  Instructed to go to the emergency room if she develops acute pain, fever.  She voiced understanding.    PLAN:  Orders Placed This Encounter     CBC with platelets and differential     Lipase     Comprehensive metabolic panel (BMP + Alb, Alk Phos, ALT, AST, Total. Bili, TP)        Ultrasound abdomen      Patient Instructions   We will call you with the results of your labs     If you have increased abdominal pain or fever, then go to the ER.    Schedule the ultrasound    Patient agrees with plan of care as outlined. Call or return to the clinic with any worsening of symptoms or no resolution. Patient/Parent verbalized understanding and is in agreement. Medication side effects reviewed.    Chart documentation with Dragon Voice recognition Software. Although reviewed after completion, some words and grammatical errors may remain.        Ami Augustine, OTDD, APRN [facility]

## 2018-04-30 ENCOUNTER — RADIANT APPOINTMENT (OUTPATIENT)
Dept: ULTRASOUND IMAGING | Facility: CLINIC | Age: 63
End: 2018-04-30
Attending: NURSE PRACTITIONER
Payer: COMMERCIAL

## 2018-04-30 DIAGNOSIS — R10.13 ABDOMINAL PAIN, EPIGASTRIC: ICD-10-CM

## 2018-04-30 PROCEDURE — 76705 ECHO EXAM OF ABDOMEN: CPT

## 2018-05-02 ENCOUNTER — RADIANT APPOINTMENT (OUTPATIENT)
Dept: MAMMOGRAPHY | Facility: CLINIC | Age: 63
End: 2018-05-02
Attending: NURSE PRACTITIONER
Payer: COMMERCIAL

## 2018-05-02 DIAGNOSIS — Z12.31 VISIT FOR SCREENING MAMMOGRAM: ICD-10-CM

## 2018-05-02 PROCEDURE — 77067 SCR MAMMO BI INCL CAD: CPT | Mod: TC

## 2018-05-03 ENCOUNTER — TELEPHONE (OUTPATIENT)
Dept: FAMILY MEDICINE | Facility: CLINIC | Age: 63
End: 2018-05-03

## 2018-05-03 DIAGNOSIS — R79.89 ELEVATED LFTS: ICD-10-CM

## 2018-05-03 DIAGNOSIS — K80.20 CALCULUS OF GALLBLADDER WITHOUT CHOLECYSTITIS WITHOUT OBSTRUCTION: Primary | ICD-10-CM

## 2018-05-04 PROBLEM — K80.20 CALCULUS OF GALLBLADDER WITHOUT CHOLECYSTITIS: Status: ACTIVE | Noted: 2018-04-30

## 2018-05-04 NOTE — TELEPHONE ENCOUNTER
Recent Results (from the past 744 hour(s))   US Abdomen Limited    Narrative    ULTRASOUND ABDOMEN LIMITED April 30, 2018 1:30 PM     HISTORY: Abdominal pain, epigastric.     COMPARISON: CT abdomen/pelvis 10/25/2011.    FINDINGS: Liver is normal in echogenicity with a small cyst adjacent  to the gallbladder fossa as seen on prior CT. This measures 1.5 cm  which is stable. The gallbladder contains gallstones and sludge, but  there is no evidence of gallbladder wall thickening or pericholecystic  fluid. Common bile duct is normal in diameter. Pancreas is normal  where visualized. Examination of the right kidney demonstrates a large  upper pole calcification, stable since prior CT. This is likely in the  parenchyma rather than in the collecting system.      Impression    IMPRESSION:    1. Cholelithiasis without ultrasound evidence of cholecystitis. No  bile duct dilatation.  2. Stable pericholecystic cyst in the liver as compared to prior CT.  2. Parenchymal calcification mid to upper pole right kidney unchanged  since prior CT.    HONORIO MARY MD   MA SCREENING DIGITAL BILAT - Future  (s+30)    Narrative    MA SCREENING DIGITAL BILATERAL 5/2/2018 2:55 PM    HISTORY:  Screening.  No new breast complaints.    COMPARISON:  02/02/2016, 11/19/2013, 03/30/2004    TECHNIQUE:  Digital mammography with CAD is performed.    BREAST DENSITY: Scattered fibroglandular densities.    COMMENTS: No findings of suspicion for malignancy.       Impression    IMPRESSION: BI-RADS CATEGORY: 1 -  NEGATIVE.    RECOMMENDED FOLLOW-UP: Annual Mammography.    JOY CHAVEZ MD     Gallstones and sludge on US.   Surgical consult recommended with ongoing pain.  Thanks Margie Taylor [facility]

## 2018-05-04 NOTE — TELEPHONE ENCOUNTER
Yolanda,     Did talk with pt - does she need a referral placed for the surgical consult?  She is also wondering if you would like to see in her in clinic.    Advise,  Genevieve DUMONT RN

## 2018-05-04 NOTE — TELEPHONE ENCOUNTER
Call placed to patient and message left on voicemail.  She should be seen by surgery due to cholelithiasis.  Would also like her to return to the lab early this coming week and have repeat LFTs drawn.  Order entered.  Please call patient and let her know about labs.  If she has any questions, a telephone encounter could be set up for this coming Tuesday, May 8.  If she is continuing to have pain, she should return to see me. Ami Augustine RNC, NP

## 2018-05-07 ENCOUNTER — ALLIED HEALTH/NURSE VISIT (OUTPATIENT)
Dept: FAMILY MEDICINE | Facility: CLINIC | Age: 63
End: 2018-05-07
Payer: COMMERCIAL

## 2018-05-07 VITALS
SYSTOLIC BLOOD PRESSURE: 121 MMHG | HEART RATE: 53 BPM | RESPIRATION RATE: 16 BRPM | BODY MASS INDEX: 41.88 KG/M2 | DIASTOLIC BLOOD PRESSURE: 84 MMHG | WEIGHT: 271.4 LBS

## 2018-05-07 DIAGNOSIS — K80.20 CALCULUS OF GALLBLADDER WITHOUT CHOLECYSTITIS WITHOUT OBSTRUCTION: ICD-10-CM

## 2018-05-07 DIAGNOSIS — R79.89 ELEVATED LFTS: ICD-10-CM

## 2018-05-07 DIAGNOSIS — I10 ESSENTIAL HYPERTENSION, BENIGN: Primary | ICD-10-CM

## 2018-05-07 PROCEDURE — 99207 ZZC NO CHARGE NURSE ONLY: CPT

## 2018-05-07 PROCEDURE — 36415 COLL VENOUS BLD VENIPUNCTURE: CPT | Performed by: NURSE PRACTITIONER

## 2018-05-07 PROCEDURE — 80076 HEPATIC FUNCTION PANEL: CPT | Performed by: NURSE PRACTITIONER

## 2018-05-08 ENCOUNTER — OFFICE VISIT (OUTPATIENT)
Dept: SURGERY | Facility: CLINIC | Age: 63
End: 2018-05-08
Payer: COMMERCIAL

## 2018-05-08 ENCOUNTER — TELEPHONE (OUTPATIENT)
Dept: SURGERY | Facility: CLINIC | Age: 63
End: 2018-05-08

## 2018-05-08 ENCOUNTER — TELEPHONE (OUTPATIENT)
Dept: FAMILY MEDICINE | Facility: CLINIC | Age: 63
End: 2018-05-08

## 2018-05-08 VITALS
WEIGHT: 271.39 LBS | SYSTOLIC BLOOD PRESSURE: 124 MMHG | HEIGHT: 68 IN | DIASTOLIC BLOOD PRESSURE: 75 MMHG | RESPIRATION RATE: 16 BRPM | HEART RATE: 52 BPM | BODY MASS INDEX: 41.13 KG/M2 | TEMPERATURE: 97.9 F

## 2018-05-08 DIAGNOSIS — K80.71 CALCULUS OF GALLBLADDER AND BILE DUCT WITH OBSTRUCTION WITHOUT CHOLECYSTITIS: Primary | ICD-10-CM

## 2018-05-08 LAB
ALBUMIN SERPL-MCNC: 3.7 G/DL (ref 3.4–5)
ALP SERPL-CCNC: 121 U/L (ref 40–150)
ALT SERPL W P-5'-P-CCNC: 32 U/L (ref 0–50)
AST SERPL W P-5'-P-CCNC: 16 U/L (ref 0–45)
BILIRUB DIRECT SERPL-MCNC: 0.1 MG/DL (ref 0–0.2)
BILIRUB SERPL-MCNC: 0.6 MG/DL (ref 0.2–1.3)
PROT SERPL-MCNC: 7.5 G/DL (ref 6.8–8.8)

## 2018-05-08 PROCEDURE — 99244 OFF/OP CNSLTJ NEW/EST MOD 40: CPT | Performed by: SURGERY

## 2018-05-08 NOTE — LETTER
5/8/2018         RE: Donita Lerner  [address] [facility] [address]        Dear Colleague,    Thank you for referring your patient, Donita Lerner, to the [facility]. Please see a copy of my visit note below.    Patient seen in consultation for gallbladder by Ami Augustine    HPI:  Patient is a 62 year old female  with complaints of upper abdominal pain  Some associated heartburn  Pain radiated into back and lasted many hours  The patient noticed the symptoms about 1.5 weeks ago.   Has been watching what she is eating   time makes the episode better.  Now feeling nausea  Has had some lesser pains with heartburn in the past  Patient has family history of gallbladder problems in sister and mother    Review Of Systems    Skin: negative  Ears/Nose/Throat: negative  Respiratory: No shortness of breath, dyspnea on exertion, cough, or hemoptysis  Cardiovascular: negative  Gastrointestinal: as above  Genitourinary: negative  Musculoskeletal: negative  Neurologic: stroke in past when was on hormone replacement  Hematologic/Lymphatic/Immunologic: negative  Endocrine: negative      Past Medical History:   Diagnosis Date     Arthritis      Depressive disorder, not elsewhere classified      Generalized anxiety disorder      Hypertension     No cardiologist     Meniere's disease 5/17/2005     Problem list name updated by automated process. Provider to review     Meniere's disease, unspecified      Sensorineural hearing loss, unspecified     left ear since birth complete     Unspecified episodic mood disorder     depression worsens winter SADD       Past Surgical History:   Procedure Laterality Date     ARTHROPLASTY KNEE  6/17/2013    Procedure: ARTHROPLASTY KNEE;  Right Total Knee Arthroplasty  ;  Surgeon: Ganesh Sheikh MD;  Location: [facility] OR     ARTHROPLASTY KNEE UNICOMPARTMENT Left 6/8/2016    Procedure: ARTHROPLASTY KNEE UNICOMPARTMENT;  Surgeon: Ganesh Sheikh MD;   "Location: [facility] OR     C ANESTH, SECTION      x3       Social History     Social History     Marital status:      Spouse name: Alex     Number of children: 3     Years of education: 16     Occupational History     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     Social History Main Topics     Smoking status: Never Smoker     Smokeless tobacco: Never Used     Alcohol use No     Drug use: No     Sexual activity: Yes     Partners: Male     Birth control/ protection: Surgical     Other Topics Concern     Not on file     Social History Narrative       Current Outpatient Prescriptions   Medication Sig Dispense Refill     amLODIPine (NORVASC) 5 MG tablet Take 1.5 tablets (7.5 mg) by mouth daily 135 tablet 3     aspirin 81 MG tablet Take 1 tablet (81 mg) by mouth daily 90 tablet 3     buPROPion (WELLBUTRIN XL) 150 MG 24 hr tablet TAKE ONE TABLET BY MOUTH ONCE DAILY IN THE MORNING 90 tablet 0     Cholecalciferol (VITAMIN D) 2000 UNITS tablet Take 2,000 Units by mouth daily (Patient taking differently: Take 2,000 Units by mouth 2 times daily ) 100 tablet 3     escitalopram (LEXAPRO) 20 MG tablet Take 1 tablet (20 mg) by mouth daily 60 tablet 0     metoprolol succinate (TOPROL-XL) 25 MG 24 hr tablet Take 1 tablet (25 mg) by mouth daily 90 tablet 3     naltrexone (DEPADE;REVIA) 50 MG tablet Take 1/2 tablet.  Time it one to two hours prior to worst cravings.  Then increase to one full tablet as instructed. 30 tablet 3       Medications and history reviewed    Physical exam:  Vitals: /75 (BP Location: Right arm, Patient Position: Chair, Cuff Size: Adult Large)  Pulse 52  Temp 97.9  F (36.6  C) (Oral)  Resp 16  Ht 1.715 m (5' 7.5\")  Wt 123.1 kg (271 lb 6.2 oz)  LMP 2002  BMI 41.88 kg/m2  BMI= Body mass index is 41.88 kg/(m^2).    Constitutional: healthy, alert and no distress  Head: Normocephalic. No masses, lesions, tenderness or " abnormalities  Cardiovascular: negative, PMI normal. No lifts, heaves, or thrills. RRR. No murmurs, clicks gallops or rub  Respiratory: negative, Percussion normal. Good diaphragmatic excursion. Lungs clear  Gastrointestinal: Abdomen soft, non-tender. BS normal. No masses, organomegaly, positive findings: lower midline scar no palpable hernia  : Deferred  Musculoskeletal: extremities normal- no gross deformities noted, gait normal and normal muscle tone  Skin: no suspicious lesions or rashes  Psychiatric: mentation appears normal and affect normal/bright  Hematologic/Lymphatic/Immunologic: Normal cervical lymph nodes  Patient able to get up on table without difficulty.    Labs show:  Results for FREYA DELEON (MRN [id number]) as of 5/8/2018 11:33   Ref. Range 4/18/2018 10:16 4/27/2018 09:43 4/30/2018 13:30 5/2/2018 14:55 5/7/2018 16:14   Sodium Latest Ref Range: 133 - 144 mmol/L 139 137      Potassium Latest Ref Range: 3.4 - 5.3 mmol/L 4.5 4.7      Chloride Latest Ref Range: 94 - 109 mmol/L 107 105      Carbon Dioxide Latest Ref Range: 20 - 32 mmol/L 30 28      Urea Nitrogen Latest Ref Range: 7 - 30 mg/dL 19 18      Creatinine Latest Ref Range: 0.52 - 1.04 mg/dL 0.71 0.78      GFR Estimate Latest Ref Range: >60 mL/min/1.7m2 83 74      GFR Estimate If Black Latest Ref Range: >60 mL/min/1.7m2 >90 90      Calcium Latest Ref Range: 8.5 - 10.1 mg/dL 8.9 9.0      Anion Gap Latest Ref Range: 3 - 14 mmol/L 2 (L) 4      Albumin Latest Ref Range: 3.4 - 5.0 g/dL 3.8 4.1   3.7   Protein Total Latest Ref Range: 6.8 - 8.8 g/dL 7.6 8.1   7.5   Bilirubin Total Latest Ref Range: 0.2 - 1.3 mg/dL 0.7 1.1   0.6   Alkaline Phosphatase Latest Ref Range: 40 - 150 U/L 110 228 (H)   121   ALT Latest Ref Range: 0 - 50 U/L 16 418 (H)   32   AST Latest Ref Range: 0 - 45 U/L 15 346 (H)   16   Bilirubin Direct Latest Ref Range: 0.0 - 0.2 mg/dL     0.1   Cholesterol Latest Ref Range: <200 mg/dL 185       HDL Cholesterol Latest Ref Range: >49  mg/dL 63       LDL Cholesterol Calculated Latest Ref Range: <100 mg/dL 105 (H)       Lipase Latest Ref Range: 73 - 393 U/L  152      Non HDL Cholesterol Latest Ref Range: <130 mg/dL 122       Triglycerides Latest Ref Range: <150 mg/dL 85       Vitamin D Deficiency screening Latest Ref Range: 20 - 75 ug/L 36       Glucose Latest Ref Range: 70 - 99 mg/dL 89 91      WBC Latest Ref Range: 4.0 - 11.0 10e9/L 6.9 6.4      Hemoglobin Latest Ref Range: 11.7 - 15.7 g/dL 15.1 15.9 (H)      Hematocrit Latest Ref Range: 35.0 - 47.0 % 46.4 48.4 (H)      Platelet Count Latest Ref Range: 150 - 450 10e9/L 232 224      RBC Count Latest Ref Range: 3.8 - 5.2 10e12/L 5.04 5.32 (H)      MCV Latest Ref Range: 78 - 100 fl 92 91      MCH Latest Ref Range: 26.5 - 33.0 pg 30.0 29.9      MCHC Latest Ref Range: 31.5 - 36.5 g/dL 32.5 32.9      RDW Latest Ref Range: 10.0 - 15.0 % 14.0 13.8      Diff Method Unknown Automated Method Automated Method      % Neutrophils Latest Units: % 66.2 74.2      % Lymphocytes Latest Units: % 23.8 17.7      % Monocytes Latest Units: % 7.8 6.9      % Eosinophils Latest Units: % 1.9 0.9      % Basophils Latest Units: % 0.3 0.3      Absolute Neutrophil Latest Ref Range: 1.6 - 8.3 10e9/L 4.6 4.7      Absolute Lymphocytes Latest Ref Range: 0.8 - 5.3 10e9/L 1.6 1.1      Absolute Monocytes Latest Ref Range: 0.0 - 1.3 10e9/L 0.5 0.4      Absolute Eosinophils Latest Ref Range: 0.0 - 0.7 10e9/L 0.1 0.1      Absolute Basophils Latest Ref Range: 0.0 - 0.2 10e9/L 0.0 0.0      MA SCREENING DIGITAL BILATERAL Unknown    Rpt    US ABDOMEN LIMITED Unknown   Rpt         Imaging shows:  ULTRASOUND ABDOMEN LIMITED April 30, 2018 1:30 PM      HISTORY: Abdominal pain, epigastric.      COMPARISON: CT abdomen/pelvis 10/25/2011.     FINDINGS: Liver is normal in echogenicity with a small cyst adjacent  to the gallbladder fossa as seen on prior CT. This measures 1.5 cm  which is stable. The gallbladder contains gallstones and sludge,  but  there is no evidence of gallbladder wall thickening or pericholecystic  fluid. Common bile duct is normal in diameter. Pancreas is normal  where visualized. Examination of the right kidney demonstrates a large  upper pole calcification, stable since prior CT. This is likely in the  parenchyma rather than in the collecting system.         IMPRESSION:    1. Cholelithiasis without ultrasound evidence of cholecystitis. No  bile duct dilatation.  2. Stable pericholecystic cyst in the liver as compared to prior CT.  2. Parenchymal calcification mid to upper pole right kidney unchanged  since prior CT.    Assessment:     ICD-10-CM    1. Calculus of gallbladder and bile duct with obstruction without cholecystitis K80.71 Patricia-Operative Worksheet   the blocking stone seems to have passed based on improved symptoms and labs    Plan: Discussed imaging findings and what to expect with surgery. Risks of bleeding, infection, anesthesia complications, conversion to open, injury to nearby structures and bile duct injury all discussed.   We went over my discharge instructions and post-op restrictions.  Patient questions answered and we will schedule the procedure.  Will plan to to [facility] with cholecystectomy for her given the lab values from earlier  .    Erick Rivera MD      Again, thank you for allowing me to participate in the care of your patient.        Sincerely,        Erick Rivera MD

## 2018-05-08 NOTE — MR AVS SNAPSHOT
After Visit Summary   5/8/2018    Donita Lerner    MRN: [id number]           Patient Information     Date Of Birth          1955        Visit Information        Provider Department      5/8/2018 11:30 AM Erick Rivera MD [facility]        Today's Diagnoses     Calculus of gallbladder and bile duct with obstruction without cholecystitis    -  1      Care Instructions    Per Physician's instructions  Will work on scheduling surgery          Follow-ups after your visit        Your next 10 appointments already scheduled     May 14, 2018  4:00 PM CDT   Nurse Only with FL RC RN   [facility] ([facility])    [address] [facility] [address] [phone]              Who to contact     If you have questions or need follow up information about today's clinic visit or your schedule please contact [facility] directly at [phone].  Normal or non-critical lab and imaging results will be communicated to you by MyChart, letter or phone within 4 business days after the clinic has received the results. If you do not hear from us within 7 days, please contact the clinic through [employer] or phone. If you have a critical or abnormal lab result, we will notify you by phone as soon as possible.  Submit refill requests through [employer] or call your pharmacy and they will forward the refill request to us. Please allow 3 business days for your refill to be completed.          Additional Information About Your Visit        MyChart Information     [employer] gives you secure access to your electronic health record. If you see a primary care provider, you can also send messages to your care team and make appointments. If you have questions, please call your primary care clinic.  If you do not have a primary care provider, please call [phone] and they will assist you.        Care EveryWhere ID     This is your Care EveryWhere ID. This could be  "used by other organizations to access your [place] medical records  [id number]        Your Vitals Were     Pulse Temperature Respirations Height Last Period BMI (Body Mass Index)    52 97.9  F (36.6  C) (Oral) 16 1.715 m (5' 7.5\") 07/01/2002 41.88 kg/m2       Blood Pressure from Last 3 Encounters:   05/08/18 124/75   05/07/18 121/84   04/27/18 128/86    Weight from Last 3 Encounters:   05/08/18 123.1 kg (271 lb 6.2 oz)   05/07/18 123.1 kg (271 lb 6.4 oz)   04/27/18 121.1 kg (267 lb)              We Performed the Following     Patricia-Operative Worksheet          Today's Medication Changes          These changes are accurate as of 5/8/18 11:51 AM.  If you have any questions, ask your nurse or doctor.               These medicines have changed or have updated prescriptions.        Dose/Directions    vitamin D 2000 units tablet   This may have changed:  when to take this   Used for:  Routine general medical examination at a health care facility        Dose:  2000 Units   Take 2,000 Units by mouth daily   Quantity:  100 tablet   Refills:  3                Primary Care Provider Office Phone # Fax #    Ami Keensoo Augustine, APRN [facility] [phone]       [address] [place] [id number]        Equal Access to Services     JEAN-PAUL KOVACS AH: Hadii romel Graham, waaxda luqadaha, qaybta kaalmada adeegyada, sonia covarrubias. So [facility] [phone].    ATENCIÓN: Si habla español, tiene a escobar disposición servicios gratuitos de asistencia lingüística. Llame al [phone].    We comply with applicable federal civil rights laws and [place] laws. We do not discriminate on the basis of race, color, national origin, age, disability, sex, sexual orientation, or gender identity.            Thank you!     Thank you for choosing [facility]  for your care. Our goal is always to provide you with excellent care. Hearing back from our patients is one way we can continue to improve our " services. Please take a few minutes to complete the written survey that you may receive in the mail after your visit with us. Thank you!             Your Updated Medication List - Protect others around you: Learn how to safely use, store and throw away your medicines at www.disposemymeds.org.          This list is accurate as of 5/8/18 11:51 AM.  Always use your most recent med list.                   Brand Name Dispense Instructions for use Diagnosis    amLODIPine 5 MG tablet    NORVASC    135 tablet    Take 1.5 tablets (7.5 mg) by mouth daily    Essential hypertension, benign       aspirin 81 MG tablet     90 tablet    Take 1 tablet (81 mg) by mouth daily        buPROPion 150 MG 24 hr tablet    WELLBUTRIN XL    90 tablet    TAKE ONE TABLET BY MOUTH ONCE DAILY IN THE MORNING    Anxiety       escitalopram 20 MG tablet    LEXAPRO    60 tablet    Take 1 tablet (20 mg) by mouth daily    Anxiety       metoprolol succinate 25 MG 24 hr tablet    TOPROL-XL    90 tablet    Take 1 tablet (25 mg) by mouth daily    Essential hypertension, benign       naltrexone 50 MG tablet    DEPADE;REVIA    30 tablet    Take 1/2 tablet.  Time it one to two hours prior to worst cravings.  Then increase to one full tablet as instructed.    Morbid obesity, unspecified obesity type (H)       vitamin D 2000 units tablet     100 tablet    Take 2,000 Units by mouth daily    Routine general medical examination at a health care facility

## 2018-05-08 NOTE — PROGRESS NOTES
Patient seen in consultation for gallbladder by Ami Augustine    HPI:  Patient is a 62 year old female  with complaints of upper abdominal pain  Some associated heartburn  Pain radiated into back and lasted many hours  The patient noticed the symptoms about 1.5 weeks ago.   Has been watching what she is eating   time makes the episode better.  Now feeling nausea  Has had some lesser pains with heartburn in the past  Patient has family history of gallbladder problems in sister and mother    Review Of Systems    Skin: negative  Ears/Nose/Throat: negative  Respiratory: No shortness of breath, dyspnea on exertion, cough, or hemoptysis  Cardiovascular: negative  Gastrointestinal: as above  Genitourinary: negative  Musculoskeletal: negative  Neurologic: stroke in past when was on hormone replacement  Hematologic/Lymphatic/Immunologic: negative  Endocrine: negative      Past Medical History:   Diagnosis Date     Arthritis      Depressive disorder, not elsewhere classified      Generalized anxiety disorder      Hypertension     No cardiologist     Meniere's disease 2005     Problem list name updated by automated process. Provider to review     Meniere's disease, unspecified      Sensorineural hearing loss, unspecified     left ear since birth complete     Unspecified episodic mood disorder     depression worsens winter SADD       Past Surgical History:   Procedure Laterality Date     ARTHROPLASTY KNEE  2013    Procedure: ARTHROPLASTY KNEE;  Right Total Knee Arthroplasty  ;  Surgeon: Ganesh Sheikh MD;  Location: [facility] OR     ARTHROPLASTY KNEE UNICOMPARTMENT Left 2016    Procedure: ARTHROPLASTY KNEE UNICOMPARTMENT;  Surgeon: Ganesh Sheikh MD;  Location: [facility] OR     C ANESTH, SECTION      x3       Social History     Social History     Marital status:      Spouse name: Alex     Number of children: 3     Years of education: 16     Occupational History     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     Social History Main Topics     Smoking status: Never Smoker     Smokeless tobacco: Never Used     Alcohol use No     Drug use: No     Sexual activity: Yes     Partners: Male     Birth control/ protection: Surgical     Other Topics Concern     Not on file     Social History Narrative       Current Outpatient Prescriptions   Medication Sig Dispense Refill     amLODIPine (NORVASC) 5 MG tablet Take 1.5 tablets (7.5 mg) by mouth daily 135 tablet 3     aspirin 81 MG tablet Take 1 tablet (81 mg) by mouth daily 90 tablet 3     buPROPion (WELLBUTRIN XL) 150 MG 24 hr tablet TAKE ONE TABLET BY MOUTH ONCE DAILY IN THE MORNING 90 tablet 0     Cholecalciferol (VITAMIN D) 2000 UNITS tablet Take 2,000 Units by mouth daily (Patient taking differently: Take 2,000 Units by mouth 2 times daily ) 100 tablet 3     escitalopram (LEXAPRO) 20 MG tablet Take 1 tablet (20 mg) by mouth daily 60 tablet 0     metoprolol succinate (TOPROL-XL) 25 MG 24 hr tablet Take 1 tablet (25 mg) by mouth daily 90 tablet 3     naltrexone (DEPADE;REVIA) 50 MG tablet Take 1/2 tablet.  Time it one to two hours prior to worst cravings.  Then increase to one full tablet as instructed. 30 tablet 3       Medications and history reviewed    Physical exam:  Vitals: /75 (BP Location: Right arm, Patient Position: Chair, Cuff Size: Adult Large)  Pulse 52  Temp 97.9  F (36.6  C) (Oral)  Resp 16  Ht 1.715 m (5' 7.5\")  Wt 123.1 kg (271 lb 6.2 oz)  LMP 07/01/2002  BMI 41.88 kg/m2  BMI= Body mass index is 41.88 kg/(m^2).    Constitutional: healthy, alert and no distress  Head: Normocephalic. No masses, lesions, tenderness or abnormalities  Cardiovascular: negative, PMI normal. No lifts, heaves, or thrills. RRR. No murmurs, clicks gallops or rub  Respiratory: negative, Percussion normal. Good diaphragmatic excursion. Lungs clear  Gastrointestinal: Abdomen soft, non-tender. BS " normal. No masses, organomegaly, positive findings: lower midline scar no palpable hernia  : Deferred  Musculoskeletal: extremities normal- no gross deformities noted, gait normal and normal muscle tone  Skin: no suspicious lesions or rashes  Psychiatric: mentation appears normal and affect normal/bright  Hematologic/Lymphatic/Immunologic: Normal cervical lymph nodes  Patient able to get up on table without difficulty.    Labs show:  Results for FREYA DELEON (MRN [id number]) as of 5/8/2018 11:33   Ref. Range 4/18/2018 10:16 4/27/2018 09:43 4/30/2018 13:30 5/2/2018 14:55 5/7/2018 16:14   Sodium Latest Ref Range: 133 - 144 mmol/L 139 137      Potassium Latest Ref Range: 3.4 - 5.3 mmol/L 4.5 4.7      Chloride Latest Ref Range: 94 - 109 mmol/L 107 105      Carbon Dioxide Latest Ref Range: 20 - 32 mmol/L 30 28      Urea Nitrogen Latest Ref Range: 7 - 30 mg/dL 19 18      Creatinine Latest Ref Range: 0.52 - 1.04 mg/dL 0.71 0.78      GFR Estimate Latest Ref Range: >60 mL/min/1.7m2 83 74      GFR Estimate If Black Latest Ref Range: >60 mL/min/1.7m2 >90 90      Calcium Latest Ref Range: 8.5 - 10.1 mg/dL 8.9 9.0      Anion Gap Latest Ref Range: 3 - 14 mmol/L 2 (L) 4      Albumin Latest Ref Range: 3.4 - 5.0 g/dL 3.8 4.1   3.7   Protein Total Latest Ref Range: 6.8 - 8.8 g/dL 7.6 8.1   7.5   Bilirubin Total Latest Ref Range: 0.2 - 1.3 mg/dL 0.7 1.1   0.6   Alkaline Phosphatase Latest Ref Range: 40 - 150 U/L 110 228 (H)   121   ALT Latest Ref Range: 0 - 50 U/L 16 418 (H)   32   AST Latest Ref Range: 0 - 45 U/L 15 346 (H)   16   Bilirubin Direct Latest Ref Range: 0.0 - 0.2 mg/dL     0.1   Cholesterol Latest Ref Range: <200 mg/dL 185       HDL Cholesterol Latest Ref Range: >49 mg/dL 63       LDL Cholesterol Calculated Latest Ref Range: <100 mg/dL 105 (H)       Lipase Latest Ref Range: 73 - 393 U/L  152      Non HDL Cholesterol Latest Ref Range: <130 mg/dL 122       Triglycerides Latest Ref Range: <150 mg/dL 85       Vitamin D  Deficiency screening Latest Ref Range: 20 - 75 ug/L 36       Glucose Latest Ref Range: 70 - 99 mg/dL 89 91      WBC Latest Ref Range: 4.0 - 11.0 10e9/L 6.9 6.4      Hemoglobin Latest Ref Range: 11.7 - 15.7 g/dL 15.1 15.9 (H)      Hematocrit Latest Ref Range: 35.0 - 47.0 % 46.4 48.4 (H)      Platelet Count Latest Ref Range: 150 - 450 10e9/L 232 224      RBC Count Latest Ref Range: 3.8 - 5.2 10e12/L 5.04 5.32 (H)      MCV Latest Ref Range: 78 - 100 fl 92 91      MCH Latest Ref Range: 26.5 - 33.0 pg 30.0 29.9      MCHC Latest Ref Range: 31.5 - 36.5 g/dL 32.5 32.9      RDW Latest Ref Range: 10.0 - 15.0 % 14.0 13.8      Diff Method Unknown Automated Method Automated Method      % Neutrophils Latest Units: % 66.2 74.2      % Lymphocytes Latest Units: % 23.8 17.7      % Monocytes Latest Units: % 7.8 6.9      % Eosinophils Latest Units: % 1.9 0.9      % Basophils Latest Units: % 0.3 0.3      Absolute Neutrophil Latest Ref Range: 1.6 - 8.3 10e9/L 4.6 4.7      Absolute Lymphocytes Latest Ref Range: 0.8 - 5.3 10e9/L 1.6 1.1      Absolute Monocytes Latest Ref Range: 0.0 - 1.3 10e9/L 0.5 0.4      Absolute Eosinophils Latest Ref Range: 0.0 - 0.7 10e9/L 0.1 0.1      Absolute Basophils Latest Ref Range: 0.0 - 0.2 10e9/L 0.0 0.0      MA SCREENING DIGITAL BILATERAL Unknown    Rpt    US ABDOMEN LIMITED Unknown   Rpt         Imaging shows:  ULTRASOUND ABDOMEN LIMITED April 30, 2018 1:30 PM      HISTORY: Abdominal pain, epigastric.      COMPARISON: CT abdomen/pelvis 10/25/2011.     FINDINGS: Liver is normal in echogenicity with a small cyst adjacent  to the gallbladder fossa as seen on prior CT. This measures 1.5 cm  which is stable. The gallbladder contains gallstones and sludge, but  there is no evidence of gallbladder wall thickening or pericholecystic  fluid. Common bile duct is normal in diameter. Pancreas is normal  where visualized. Examination of the right kidney demonstrates a large  upper pole calcification, stable since prior  CT. This is likely in the  parenchyma rather than in the collecting system.         IMPRESSION:    1. Cholelithiasis without ultrasound evidence of cholecystitis. No  bile duct dilatation.  2. Stable pericholecystic cyst in the liver as compared to prior CT.  2. Parenchymal calcification mid to upper pole right kidney unchanged  since prior CT.    Assessment:     ICD-10-CM    1. Calculus of gallbladder and bile duct with obstruction without cholecystitis K80.71 Patricia-Operative Worksheet   the blocking stone seems to have passed based on improved symptoms and labs    Plan: Discussed imaging findings and what to expect with surgery. Risks of bleeding, infection, anesthesia complications, conversion to open, injury to nearby structures and bile duct injury all discussed.   We went over my discharge instructions and post-op restrictions.  Patient questions answered and we will schedule the procedure.  Will plan to to [facility] with cholecystectomy for her given the lab values from earlier  .    Erick Rivera MD

## 2018-05-08 NOTE — TELEPHONE ENCOUNTER
Type of surgery: Laparoscopic Cholecystectomy with intraoperative cholangiogram  Location of surgery: [place] OR  Date and time of surgery: Friday, May 11, 2018 @ 13:30  Surgeon: Dr. Rivera  Pre-Op Appt Date: PCP clinic will call to facilitate  Post-Op Appt Date: Patient is going to call to schedule   Packet sent out: Yes/given in clinic  Pre-cert/Authorization completed:  Not Applicable  Date: May 8, 2018    Ml MICHELLE CMA

## 2018-05-09 ENCOUNTER — OFFICE VISIT (OUTPATIENT)
Dept: FAMILY MEDICINE | Facility: CLINIC | Age: 63
End: 2018-05-09
Payer: COMMERCIAL

## 2018-05-09 VITALS
HEIGHT: 68 IN | TEMPERATURE: 96 F | RESPIRATION RATE: 12 BRPM | SYSTOLIC BLOOD PRESSURE: 123 MMHG | OXYGEN SATURATION: 98 % | DIASTOLIC BLOOD PRESSURE: 83 MMHG | HEART RATE: 50 BPM | BODY MASS INDEX: 40.92 KG/M2 | WEIGHT: 270 LBS

## 2018-05-09 DIAGNOSIS — Z01.818 PREOP GENERAL PHYSICAL EXAM: Primary | ICD-10-CM

## 2018-05-09 DIAGNOSIS — K80.20 CALCULUS OF GALLBLADDER WITHOUT CHOLECYSTITIS WITHOUT OBSTRUCTION: ICD-10-CM

## 2018-05-09 PROCEDURE — 93000 ELECTROCARDIOGRAM COMPLETE: CPT | Performed by: NURSE PRACTITIONER

## 2018-05-09 PROCEDURE — 99214 OFFICE O/P EST MOD 30 MIN: CPT | Performed by: NURSE PRACTITIONER

## 2018-05-09 ASSESSMENT — PAIN SCALES - GENERAL: PAINLEVEL: NO PAIN (0)

## 2018-05-09 NOTE — MR AVS SNAPSHOT
After Visit Summary   5/9/2018    Donita Lerner    MRN: [id number]           Patient Information     Date Of Birth          1955        Visit Information        Provider Department      5/9/2018 8:20 AM Ami Augustine APRN CNP [facility]        Today's Diagnoses     Preop general physical exam    -  1      Care Instructions        Before Your Surgery      Call your surgeon if there is any change in your health. This includes signs of a cold or flu (such as a sore throat, runny nose, cough, rash or fever).    Do not smoke, drink alcohol or take over the counter medicine (unless your surgeon or primary care doctor tells you to) for the 24 hours before and after surgery.    If you take prescribed drugs: Follow your doctor s orders about which medicines to take and which to stop until after surgery.    Eating and drinking prior to surgery: follow the instructions from your surgeon    Take a shower or bath the night before surgery. Use the soap your surgeon gave you to gently clean your skin. If you do not have soap from your surgeon, use your regular soap. Do not shave or scrub the surgery site.  Wear clean pajamas and have clean sheets on your bed.           Follow-ups after your visit        Your next 10 appointments already scheduled     May 11, 2018   Procedure with Erick Rivera MD   [facility] (--)    [address] [address]   [phone]           The medical center is located at [address] (between [address] in [place], [place] north of [place]).            May 14, 2018  4:00 PM CDT   Nurse Only with TURNER BELL RN   [facility]    [address] [facility] [id number] [phone]              Who to contact     If you have questions or need follow up information about today's clinic visit or your schedule please contact [facility]  "directly at [phone].  Normal or non-critical lab and imaging results will be communicated to you by MyChart, letter or phone within 4 business days after the clinic has received the results. If you do not hear from us within 7 days, please contact the clinic through [employer] or phone. If you have a critical or abnormal lab result, we will notify you by phone as soon as possible.  Submit refill requests through [employer] or call your pharmacy and they will forward the refill request to us. Please allow 3 business days for your refill to be completed.          Additional Information About Your Visit        [employer] Information     [employer] gives you secure access to your electronic health record. If you see a primary care provider, you can also send messages to your care team and make appointments. If you have questions, please call your primary care clinic.  If you do not have a primary care provider, please call [phone] and they will assist you.        Care EveryWhere ID     This is your Care EveryWhere ID. This could be used by other organizations to access your [place] medical records  [id number]        Your Vitals Were     Pulse Temperature Respirations Height Last Period Pulse Oximetry    50 96  F (35.6  C) (Tympanic) 12 5' 7.5\" (1.715 m) 07/01/2002 98%    Breastfeeding? BMI (Body Mass Index)                No 41.66 kg/m2           Blood Pressure from Last 3 Encounters:   05/09/18 123/83   05/08/18 124/75   05/07/18 121/84    Weight from Last 3 Encounters:   05/09/18 270 lb (122.5 kg)   05/08/18 271 lb 6.2 oz (123.1 kg)   05/07/18 271 lb 6.4 oz (123.1 kg)              We Performed the Following     EKG 12-lead complete w/read - Clinics          Today's Medication Changes          These changes are accurate as of 5/9/18  9:25 AM.  If you have any questions, ask your nurse or doctor.               These medicines have changed or have updated prescriptions.        Dose/Directions    vitamin D 2000 units " tablet   This may have changed:  when to take this   Used for:  Routine general medical examination at a health care facility        Dose:  2000 Units   Take 2,000 Units by mouth daily   Quantity:  100 tablet   Refills:  3                Primary Care Provider Office Phone # Fax #    TERRA Lopez [facility] [phone]       [address] [facility] [address]        Equal Access to Services     [facility] Center[facility] : Hadii aad ku hadasho Soomaali, waaxda luqadaha, qaybta kaalmada adeegyada, waxay idiin hayaan adeeg kharash la'jeannen ah. So [facility] [phone].    ATENCIÓN: Si habla español, tiene a escobar disposición servicios gratuitos de asistencia lingüística. Shanda al [phone].    We comply with applicable federal civil rights laws and [place] laws. We do not discriminate on the basis of race, color, national origin, age, disability, sex, sexual orientation, or gender identity.            Thank you!     Thank you for choosing [facility]  for your care. Our goal is always to provide you with excellent care. Hearing back from our patients is one way we can continue to improve our services. Please take a few minutes to complete the written survey that you may receive in the mail after your visit with us. Thank you!             Your Updated Medication List - Protect others around you: Learn how to safely use, store and throw away your medicines at www.disposemymeds.org.          This list is accurate as of 5/9/18  9:25 AM.  Always use your most recent med list.                   Brand Name Dispense Instructions for use Diagnosis    amLODIPine 5 MG tablet    NORVASC    135 tablet    Take 1.5 tablets (7.5 mg) by mouth daily    Essential hypertension, benign       aspirin 81 MG tablet     90 tablet    Take 1 tablet (81 mg) by mouth daily        buPROPion 150 MG 24 hr tablet    WELLBUTRIN XL    90 tablet    TAKE ONE TABLET BY MOUTH ONCE DAILY IN THE MORNING    Anxiety       escitalopram 20 MG  tablet    LEXAPRO    60 tablet    Take 1 tablet (20 mg) by mouth daily    Anxiety       metoprolol succinate 25 MG 24 hr tablet    TOPROL-XL    90 tablet    Take 1 tablet (25 mg) by mouth daily    Essential hypertension, benign       naltrexone 50 MG tablet    DEPADE;REVIA    30 tablet    Take 1/2 tablet.  Time it one to two hours prior to worst cravings.  Then increase to one full tablet as instructed.    Morbid obesity, unspecified obesity type (H)       vitamin D 2000 units tablet     100 tablet    Take 2,000 Units by mouth daily    Routine general medical examination at a health care facility

## 2018-05-09 NOTE — LETTER
May 9, 2018      Donita Lerner  [address] [place] [address]        To Whom It May Concern:    Donita Lerner was seen in our clinic. She may return to work without restrictions on or about 5/14/18.      Sincerely,        Ami Augustine, NP, APRN CNP

## 2018-05-09 NOTE — PROGRESS NOTES
[facility]  [address]  [facility] [address]  [phone]  Dept: [phone]    PRE-OP EVALUATION:  Today's date: 2018    Donita Lerner (: 1955) presents for pre-operative evaluation assessment as requested by Erick Tejada MD .  She requires evaluation and anesthesia risk assessment prior to undergoing surgery/procedure for treatment of Gallbladder .    Proposed Surgery/ Procedure: LAPAROSCOPIC CHOLECYSTECTOMY WITH CHOLANGIOGRAMS  Date of Surgery/ Procedure: 18  Time of Surgery/ Procedure: 1:30 pm  Hospital/Surgical Facility: [facility]    Primary Physician: Ami Augustine  Type of Anesthesia Anticipated: General    Patient has a Health Care Directive or Living Will:  NO    1. NO - Do you have a history of heart attack, stroke, stent, bypass or surgery on an artery in the head, neck, heart or legs?  2. NO - Do you ever have any pain or discomfort in your chest?  3. NO - Do you have a history of  Heart Failure?  4. NO - Are you troubled by shortness of breath when: walking on the level, up a slight hill or at night?  5. NO - Do you currently have a cold, bronchitis or other respiratory infection?  6. NO - Do you have a cough, shortness of breath or wheezing?  7. NO - Do you sometimes get pains in the calves of your legs when you walk?  8. NO - Do you or anyone in your family have previous history of blood clots?  9. NO - Do you or does anyone in your family have a serious bleeding problem such as prolonged bleeding following surgeries or cuts?  10. NO - Have you ever had problems with anemia or been told to take iron pills?  11. NO - Have you had any abnormal blood loss such as black, tarry or bloody stools, or abnormal vaginal bleeding?  12. NO - Have you ever had a blood transfusion?  13. NO - Have you or any of your relatives ever had problems with anesthesia?  14. NO - Do you have sleep apnea, excessive snoring or daytime  drowsiness?  15. NO - Do you have any prosthetic heart valves?  16. YES - Do you have prosthetic joints? Both knees  17. NO - Is there any chance that you may be pregnant?      HPI:     HPI related to upcoming procedure: Recent episode of abdominal pain in epigastric region.  Liver function tests elevated.  Abdominal CT scan showed cholelithiasis without evidence of cholecystitis.  She is scheduled to have lap cholecystectomy on 5/11/2018.      See problem list for active medical problems.  Problems all longstanding and stable, except as noted/documented.  See ROS for pertinent symptoms related to these conditions.                                                                                                                                                          .  HYPERTENSION - Patient has longstanding history of HTN , currently denies any symptoms referable to elevated blood pressure. Specifically denies chest pain, palpitations, dyspnea, orthopnea, PND or peripheral edema. Blood pressure readings have been in normal range. Current medication regimen is as listed below. Patient denies any side effects of medication.                                                                                                                                                                                          .    MEDICAL HISTORY:     Patient Active Problem List    Diagnosis Date Noted     Calculus of gallbladder without cholecystitis 04/30/2018     Priority: Medium     Osteoporosis 11/10/2016     Priority: Medium     Seasonal affective disorder (H) 11/10/2016     Priority: Medium     S/P left unicompartmental knee replacement 06/09/2016     Priority: Medium     Major depressive disorder, single episode, mild (H) 05/23/2016     Priority: Medium      obesity, unspecified obesity type (HCC) 03/28/2016     Priority: Medium     Essential hypertension 01/20/2016     Priority: Medium     Quality         Abnormal MRI of head  2015     Priority: Medium     Possible stroke noted on MRI done in  for menieres        Advanced care planning/counseling discussion 2013     Priority: Medium     S/P total knee arthroplasty 2013     Priority: Medium     CNS DISORDER -gliosis on mri 2005     Priority: Medium     Sensorineural hearing loss 2005     Priority: Medium     Problem list name updated by automated process. Provider to review       Generalized anxiety disorder 2003     Priority: Medium     Symptomatic menopausal or female climacteric states 2003     Priority: Medium      Past Medical History:   Diagnosis Date     Arthritis      Depressive disorder, not elsewhere classified      Generalized anxiety disorder      Hypertension     No cardiologist     Meniere's disease 2005     Problem list name updated by automated process. Provider to review     Meniere's disease, unspecified      Sensorineural hearing loss, unspecified     left ear since birth complete     Unspecified episodic mood disorder     depression worsens winter SADD     Past Surgical History:   Procedure Laterality Date     ARTHROPLASTY KNEE  2013    Procedure: ARTHROPLASTY KNEE;  Right Total Knee Arthroplasty  ;  Surgeon: Ganesh Sheikh MD;  Location:  OR     ARTHROPLASTY KNEE UNICOMPARTMENT Left 2016    Procedure: ARTHROPLASTY KNEE UNICOMPARTMENT;  Surgeon: Ganesh Sheikh MD;  Location:  OR     C ANESTH, SECTION      x3     Current Outpatient Prescriptions   Medication Sig Dispense Refill     amLODIPine (NORVASC) 5 MG tablet Take 1.5 tablets (7.5 mg) by mouth daily 135 tablet 3     aspirin 81 MG tablet Take 1 tablet (81 mg) by mouth daily 90 tablet 3     buPROPion (WELLBUTRIN XL) 150 MG 24 hr tablet TAKE ONE TABLET BY MOUTH ONCE DAILY IN THE MORNING 90 tablet 0     Cholecalciferol (VITAMIN D) 2000 UNITS tablet Take 2,000 Units by mouth daily (Patient taking differently: Take 2,000 Units by  "mouth 2 times daily ) 100 tablet 3     escitalopram (LEXAPRO) 20 MG tablet Take 1 tablet (20 mg) by mouth daily 60 tablet 0     metoprolol succinate (TOPROL-XL) 25 MG 24 hr tablet Take 1 tablet (25 mg) by mouth daily 90 tablet 3     naltrexone (DEPADE;REVIA) 50 MG tablet Take 1/2 tablet.  Time it one to two hours prior to worst cravings.  Then increase to one full tablet as instructed. (Patient not taking: Reported on 5/9/2018) 30 tablet 3     OTC products: None, except as noted above    Allergies   Allergen Reactions     Penicillins Anaphylaxis and Swelling     Lisinopril Cough     Sulfa Drugs Rash      Latex Allergy: NO    Social History   Substance Use Topics     Smoking status: Never Smoker     Smokeless tobacco: Never Used     Alcohol use No     History   Drug Use No       REVIEW OF SYSTEMS:   Constitutional, neuro, ENT, endocrine, pulmonary, cardiac, gastrointestinal, genitourinary, musculoskeletal, integument and psychiatric systems are negative, except as otherwise noted.    EXAM:   /83 (BP Location: Right arm, Patient Position: Chair, Cuff Size: Adult Large)  Pulse 50  Temp 96  F (35.6  C) (Tympanic)  Resp 12  Ht 5' 7.5\" (1.715 m)  Wt 270 lb (122.5 kg)  LMP 07/01/2002  SpO2 98%  Breastfeeding? No  BMI 41.66 kg/m2    GENERAL APPEARANCE: healthy, alert and no distress     EYES: Eyes grossly normal to inspection and conjunctivae and sclerae normal     HENT: ear canals and TM's normal and nose and mouth without ulcers or lesions     NECK: no adenopathy, no asymmetry, masses, or scars and thyroid normal to palpation     RESP: lungs clear to auscultation - no rales, rhonchi or wheezes     CV: regular rates and rhythm, normal S1 S2, no S3 or S4 and no murmur, click or rub     ABDOMEN:  soft, nontender, no HSM or masses and bowel sounds normal     MS: extremities normal- no gross deformities noted, no evidence of inflammation in joints, FROM in all extremities.     SKIN: no suspicious lesions or " rashes     NEURO: Normal strength and tone, sensory exam grossly normal, mentation intact and speech normal     PSYCH: mentation appears normal. and affect normal/bright     LYMPHATICS: No cervical adenopathy    DIAGNOSTICS:   EKG: sinus bradycardia, normal axis, normal intervals, no acute ST/T changes c/w ischemia, unchanged from previous tracings, old anterior infarct (unchanged from EKG 2013)    Recent Labs   Lab Test  04/27/18   0943  04/18/18   1016   03/28/16   1109   06/17/13   0612   HGB  15.9*  15.1   < >  14.9   < >   --    PLT  224  232   < >  234   < >   --    NA  137  139   < >  143   < >   --    POTASSIUM  4.7  4.5   < >  3.8   < >  3.8   CR  0.78  0.71   < >  0.75   < >  0.62   A1C   --    --    --   5.5   --   5.0    < > = values in this interval not displayed.        IMPRESSION:   Reason for surgery/procedure: Cholelithiasis  Diagnosis/reason for consult: History and physical      Ok to take amlodipine, do not take ASA for 3 days    The proposed surgical procedure is considered LOW risk.    REVISED CARDIAC RISK INDEX  The patient has the following serious cardiovascular risks for perioperative complications such as (MI, PE, VFib and 3  AV Block):  No serious cardiac risks  INTERPRETATION: 0 risks: Class I (very low risk - 0.4% complication rate)    The patient has the following additional risks for perioperative complications:  No identified additional risks      ICD-10-CM    1. Preop general physical exam Z01.818 EKG 12-lead complete w/read - Clinics   2.     Calculus of bladder without cholecystitis without obstruction    RECOMMENDATIONS:     --Consult hospital rounder / IM to assist post-op medical management    --Patient is to take all scheduled medications on the day of surgery EXCEPT for modifications listed below.    APPROVAL GIVEN to proceed with proposed procedure, without further diagnostic evaluation       Signed Electronically by: Ami Augustine, NP, APRN CNP    Copy of this  evaluation report is provided to requesting physician.    [place] Preop Guidelines    Revised Cardiac Risk Index

## 2018-05-10 ENCOUNTER — ANESTHESIA EVENT (OUTPATIENT)
Dept: SURGERY | Facility: CLINIC | Age: 63
End: 2018-05-10
Payer: COMMERCIAL

## 2018-05-11 ENCOUNTER — HOSPITAL ENCOUNTER (OUTPATIENT)
Facility: CLINIC | Age: 63
Setting detail: OBSERVATION
Discharge: HOME | End: 2018-05-12
Attending: SURGERY | Admitting: SURGERY
Payer: COMMERCIAL

## 2018-05-11 ENCOUNTER — ANESTHESIA (OUTPATIENT)
Dept: SURGERY | Facility: CLINIC | Age: 63
End: 2018-05-11
Payer: COMMERCIAL

## 2018-05-11 ENCOUNTER — APPOINTMENT (OUTPATIENT)
Dept: GENERAL RADIOLOGY | Facility: CLINIC | Age: 63
End: 2018-05-11
Attending: SURGERY
Payer: COMMERCIAL

## 2018-05-11 DIAGNOSIS — K80.20 CALCULUS OF GALLBLADDER WITHOUT CHOLECYSTITIS WITHOUT OBSTRUCTION: Primary | ICD-10-CM

## 2018-05-11 PROBLEM — Z90.49 S/P LAPAROSCOPIC CHOLECYSTECTOMY: Status: ACTIVE | Noted: 2018-05-11

## 2018-05-11 LAB — HGB BLD-MCNC: 14 G/DL (ref 11.7–15.7)

## 2018-05-11 PROCEDURE — 25000128 H RX IP 250 OP 636: Performed by: NURSE ANESTHETIST, CERTIFIED REGISTERED

## 2018-05-11 PROCEDURE — 27210794 ZZH OR GENERAL SUPPLY STERILE: Performed by: SURGERY

## 2018-05-11 PROCEDURE — 47563 LAPARO CHOLECYSTECTOMY/GRAPH: CPT | Mod: AS | Performed by: PHYSICIAN ASSISTANT

## 2018-05-11 PROCEDURE — 85018 HEMOGLOBIN: CPT | Performed by: SURGERY

## 2018-05-11 PROCEDURE — 36415 COLL VENOUS BLD VENIPUNCTURE: CPT | Performed by: SURGERY

## 2018-05-11 PROCEDURE — G0378 HOSPITAL OBSERVATION PER HR: HCPCS

## 2018-05-11 PROCEDURE — 71000012 ZZH RECOVERY PHASE 1 LEVEL 1 FIRST HR: Performed by: SURGERY

## 2018-05-11 PROCEDURE — 36000063 ZZH SURGERY LEVEL 4 EA 15 ADDTL MIN: Performed by: SURGERY

## 2018-05-11 PROCEDURE — 40000277 XR SURGERY CARM FLUORO LESS THAN 5 MIN W STILLS

## 2018-05-11 PROCEDURE — 25000566 ZZH SEVOFLURANE, EA 15 MIN: Performed by: SURGERY

## 2018-05-11 PROCEDURE — 25000125 ZZHC RX 250: Performed by: SURGERY

## 2018-05-11 PROCEDURE — 25000132 ZZH RX MED GY IP 250 OP 250 PS 637: Performed by: NURSE ANESTHETIST, CERTIFIED REGISTERED

## 2018-05-11 PROCEDURE — 25000132 ZZH RX MED GY IP 250 OP 250 PS 637: Performed by: SURGERY

## 2018-05-11 PROCEDURE — 40000305 ZZH STATISTIC PRE PROC ASSESS I: Performed by: SURGERY

## 2018-05-11 PROCEDURE — 25000128 H RX IP 250 OP 636: Performed by: SURGERY

## 2018-05-11 PROCEDURE — 25000125 ZZHC RX 250: Performed by: NURSE ANESTHETIST, CERTIFIED REGISTERED

## 2018-05-11 PROCEDURE — 37000009 ZZH ANESTHESIA TECHNICAL FEE, EACH ADDTL 15 MIN: Performed by: SURGERY

## 2018-05-11 PROCEDURE — 25800025 ZZH RX 258: Performed by: SURGERY

## 2018-05-11 PROCEDURE — 71000013 ZZH RECOVERY PHASE 1 LEVEL 1 EA ADDTL HR: Performed by: SURGERY

## 2018-05-11 PROCEDURE — 37000008 ZZH ANESTHESIA TECHNICAL FEE, 1ST 30 MIN: Performed by: SURGERY

## 2018-05-11 PROCEDURE — 88304 TISSUE EXAM BY PATHOLOGIST: CPT | Performed by: SURGERY

## 2018-05-11 PROCEDURE — 36000065 ZZH SURGERY LEVEL 4 W FLUORO 1ST 30 MIN: Performed by: SURGERY

## 2018-05-11 PROCEDURE — 47563 LAPARO CHOLECYSTECTOMY/GRAPH: CPT | Performed by: SURGERY

## 2018-05-11 PROCEDURE — 88304 TISSUE EXAM BY PATHOLOGIST: CPT | Mod: 26 | Performed by: SURGERY

## 2018-05-11 RX ORDER — EPHEDRINE SULFATE 50 MG/ML
INJECTION, SOLUTION INTRAMUSCULAR; INTRAVENOUS; SUBCUTANEOUS PRN
Status: DISCONTINUED | OUTPATIENT
Start: 2018-05-11 | End: 2018-05-11

## 2018-05-11 RX ORDER — LIDOCAINE 40 MG/G
CREAM TOPICAL
Status: DISCONTINUED | OUTPATIENT
Start: 2018-05-11 | End: 2018-05-11 | Stop reason: HOSPADM

## 2018-05-11 RX ORDER — GLYCOPYRROLATE 0.2 MG/ML
INJECTION, SOLUTION INTRAMUSCULAR; INTRAVENOUS PRN
Status: DISCONTINUED | OUTPATIENT
Start: 2018-05-11 | End: 2018-05-11

## 2018-05-11 RX ORDER — NEOSTIGMINE METHYLSULFATE 1 MG/ML
VIAL (ML) INJECTION PRN
Status: DISCONTINUED | OUTPATIENT
Start: 2018-05-11 | End: 2018-05-11

## 2018-05-11 RX ORDER — ALBUTEROL SULFATE 0.83 MG/ML
2.5 SOLUTION RESPIRATORY (INHALATION) EVERY 4 HOURS PRN
Status: DISCONTINUED | OUTPATIENT
Start: 2018-05-11 | End: 2018-05-11 | Stop reason: HOSPADM

## 2018-05-11 RX ORDER — ONDANSETRON 4 MG/1
4 TABLET, ORALLY DISINTEGRATING ORAL EVERY 30 MIN PRN
Status: DISCONTINUED | OUTPATIENT
Start: 2018-05-11 | End: 2018-05-11 | Stop reason: HOSPADM

## 2018-05-11 RX ORDER — PROPOFOL 10 MG/ML
INJECTION, EMULSION INTRAVENOUS PRN
Status: DISCONTINUED | OUTPATIENT
Start: 2018-05-11 | End: 2018-05-11

## 2018-05-11 RX ORDER — SODIUM CHLORIDE, SODIUM LACTATE, POTASSIUM CHLORIDE, CALCIUM CHLORIDE 600; 310; 30; 20 MG/100ML; MG/100ML; MG/100ML; MG/100ML
INJECTION, SOLUTION INTRAVENOUS CONTINUOUS
Status: DISCONTINUED | OUTPATIENT
Start: 2018-05-11 | End: 2018-05-11 | Stop reason: HOSPADM

## 2018-05-11 RX ORDER — KETOROLAC TROMETHAMINE 30 MG/ML
INJECTION, SOLUTION INTRAMUSCULAR; INTRAVENOUS PRN
Status: DISCONTINUED | OUTPATIENT
Start: 2018-05-11 | End: 2018-05-11

## 2018-05-11 RX ORDER — NALOXONE HYDROCHLORIDE 0.4 MG/ML
.1-.4 INJECTION, SOLUTION INTRAMUSCULAR; INTRAVENOUS; SUBCUTANEOUS
Status: DISCONTINUED | OUTPATIENT
Start: 2018-05-11 | End: 2018-05-12 | Stop reason: HOSPADM

## 2018-05-11 RX ORDER — BUPIVACAINE HYDROCHLORIDE AND EPINEPHRINE 5; 5 MG/ML; UG/ML
INJECTION, SOLUTION PERINEURAL PRN
Status: DISCONTINUED | OUTPATIENT
Start: 2018-05-11 | End: 2018-05-11 | Stop reason: HOSPADM

## 2018-05-11 RX ORDER — NALOXONE HYDROCHLORIDE 0.4 MG/ML
.1-.4 INJECTION, SOLUTION INTRAMUSCULAR; INTRAVENOUS; SUBCUTANEOUS
Status: DISCONTINUED | OUTPATIENT
Start: 2018-05-11 | End: 2018-05-11 | Stop reason: HOSPADM

## 2018-05-11 RX ORDER — KETOROLAC TROMETHAMINE 30 MG/ML
30 INJECTION, SOLUTION INTRAMUSCULAR; INTRAVENOUS EVERY 6 HOURS PRN
Status: DISCONTINUED | OUTPATIENT
Start: 2018-05-11 | End: 2018-05-11 | Stop reason: HOSPADM

## 2018-05-11 RX ORDER — BUPROPION HYDROCHLORIDE 150 MG/1
150 TABLET ORAL DAILY
Status: DISCONTINUED | OUTPATIENT
Start: 2018-05-11 | End: 2018-05-12 | Stop reason: HOSPADM

## 2018-05-11 RX ORDER — LIDOCAINE 40 MG/G
CREAM TOPICAL
Status: DISCONTINUED | OUTPATIENT
Start: 2018-05-11 | End: 2018-05-12 | Stop reason: HOSPADM

## 2018-05-11 RX ORDER — ESCITALOPRAM OXALATE 20 MG/1
20 TABLET ORAL DAILY
Status: DISCONTINUED | OUTPATIENT
Start: 2018-05-12 | End: 2018-05-12 | Stop reason: HOSPADM

## 2018-05-11 RX ORDER — HYDROMORPHONE HYDROCHLORIDE 1 MG/ML
.3-.5 INJECTION, SOLUTION INTRAMUSCULAR; INTRAVENOUS; SUBCUTANEOUS EVERY 10 MIN PRN
Status: DISCONTINUED | OUTPATIENT
Start: 2018-05-11 | End: 2018-05-11 | Stop reason: HOSPADM

## 2018-05-11 RX ORDER — MEPERIDINE HYDROCHLORIDE 25 MG/ML
12.5 INJECTION INTRAMUSCULAR; INTRAVENOUS; SUBCUTANEOUS
Status: DISCONTINUED | OUTPATIENT
Start: 2018-05-11 | End: 2018-05-11 | Stop reason: HOSPADM

## 2018-05-11 RX ORDER — ONDANSETRON 2 MG/ML
INJECTION INTRAMUSCULAR; INTRAVENOUS PRN
Status: DISCONTINUED | OUTPATIENT
Start: 2018-05-11 | End: 2018-05-11

## 2018-05-11 RX ORDER — IBUPROFEN 600 MG/1
600 TABLET, FILM COATED ORAL
Status: COMPLETED | OUTPATIENT
Start: 2018-05-11 | End: 2018-05-12

## 2018-05-11 RX ORDER — DEXAMETHASONE SODIUM PHOSPHATE 4 MG/ML
INJECTION, SOLUTION INTRA-ARTICULAR; INTRALESIONAL; INTRAMUSCULAR; INTRAVENOUS; SOFT TISSUE PRN
Status: DISCONTINUED | OUTPATIENT
Start: 2018-05-11 | End: 2018-05-11

## 2018-05-11 RX ORDER — FENTANYL CITRATE 50 UG/ML
25-50 INJECTION, SOLUTION INTRAMUSCULAR; INTRAVENOUS
Status: DISCONTINUED | OUTPATIENT
Start: 2018-05-11 | End: 2018-05-11 | Stop reason: HOSPADM

## 2018-05-11 RX ORDER — CLINDAMYCIN PHOSPHATE 900 MG/50ML
900 INJECTION, SOLUTION INTRAVENOUS SEE ADMIN INSTRUCTIONS
Status: DISCONTINUED | OUTPATIENT
Start: 2018-05-11 | End: 2018-05-11 | Stop reason: HOSPADM

## 2018-05-11 RX ORDER — OXYCODONE AND ACETAMINOPHEN 5; 325 MG/1; MG/1
1-2 TABLET ORAL EVERY 4 HOURS PRN
Qty: 20 TABLET | Refills: 0 | Status: SHIPPED | OUTPATIENT
Start: 2018-05-11 | End: 2019-02-01

## 2018-05-11 RX ORDER — OXYCODONE AND ACETAMINOPHEN 5; 325 MG/1; MG/1
1-2 TABLET ORAL EVERY 4 HOURS PRN
Status: DISCONTINUED | OUTPATIENT
Start: 2018-05-11 | End: 2018-05-12 | Stop reason: HOSPADM

## 2018-05-11 RX ORDER — METOPROLOL SUCCINATE 25 MG/1
25 TABLET, EXTENDED RELEASE ORAL DAILY
Status: DISCONTINUED | OUTPATIENT
Start: 2018-05-11 | End: 2018-05-12 | Stop reason: HOSPADM

## 2018-05-11 RX ORDER — FENTANYL CITRATE 50 UG/ML
INJECTION, SOLUTION INTRAMUSCULAR; INTRAVENOUS PRN
Status: DISCONTINUED | OUTPATIENT
Start: 2018-05-11 | End: 2018-05-11

## 2018-05-11 RX ORDER — GABAPENTIN 300 MG/1
300 CAPSULE ORAL ONCE
Status: COMPLETED | OUTPATIENT
Start: 2018-05-11 | End: 2018-05-11

## 2018-05-11 RX ORDER — OXYCODONE AND ACETAMINOPHEN 5; 325 MG/1; MG/1
1 TABLET ORAL
Status: DISCONTINUED | OUTPATIENT
Start: 2018-05-11 | End: 2018-05-11

## 2018-05-11 RX ORDER — IOPAMIDOL 612 MG/ML
INJECTION, SOLUTION INTRATHECAL PRN
Status: DISCONTINUED | OUTPATIENT
Start: 2018-05-11 | End: 2018-05-11 | Stop reason: HOSPADM

## 2018-05-11 RX ORDER — CLINDAMYCIN PHOSPHATE 900 MG/50ML
900 INJECTION, SOLUTION INTRAVENOUS
Status: COMPLETED | OUTPATIENT
Start: 2018-05-11 | End: 2018-05-11

## 2018-05-11 RX ORDER — ONDANSETRON 2 MG/ML
4 INJECTION INTRAMUSCULAR; INTRAVENOUS EVERY 30 MIN PRN
Status: DISCONTINUED | OUTPATIENT
Start: 2018-05-11 | End: 2018-05-11 | Stop reason: HOSPADM

## 2018-05-11 RX ORDER — ACETAMINOPHEN 325 MG/1
975 TABLET ORAL ONCE
Status: COMPLETED | OUTPATIENT
Start: 2018-05-11 | End: 2018-05-11

## 2018-05-11 RX ADMIN — SODIUM CHLORIDE, POTASSIUM CHLORIDE, SODIUM LACTATE AND CALCIUM CHLORIDE: 600; 310; 30; 20 INJECTION, SOLUTION INTRAVENOUS at 13:08

## 2018-05-11 RX ADMIN — MIDAZOLAM HYDROCHLORIDE 2 MG: 1 INJECTION, SOLUTION INTRAMUSCULAR; INTRAVENOUS at 13:23

## 2018-05-11 RX ADMIN — GLYCOPYRROLATE 0.6 MG: 0.2 INJECTION, SOLUTION INTRAMUSCULAR; INTRAVENOUS at 14:43

## 2018-05-11 RX ADMIN — FENTANYL CITRATE 50 MCG: 50 INJECTION, SOLUTION INTRAMUSCULAR; INTRAVENOUS at 13:28

## 2018-05-11 RX ADMIN — ACETAMINOPHEN 975 MG: 325 TABLET, FILM COATED ORAL at 13:06

## 2018-05-11 RX ADMIN — ROCURONIUM BROMIDE 20 MG: 10 INJECTION INTRAVENOUS at 13:40

## 2018-05-11 RX ADMIN — CLINDAMYCIN PHOSPHATE 900 MG: 18 INJECTION, SOLUTION INTRAVENOUS at 13:23

## 2018-05-11 RX ADMIN — MIDAZOLAM HYDROCHLORIDE 3 MG: 1 INJECTION, SOLUTION INTRAMUSCULAR; INTRAVENOUS at 13:27

## 2018-05-11 RX ADMIN — FENTANYL CITRATE 100 MCG: 50 INJECTION, SOLUTION INTRAMUSCULAR; INTRAVENOUS at 13:38

## 2018-05-11 RX ADMIN — FENTANYL CITRATE 100 MCG: 50 INJECTION, SOLUTION INTRAMUSCULAR; INTRAVENOUS at 13:23

## 2018-05-11 RX ADMIN — FENTANYL CITRATE 100 MCG: 50 INJECTION, SOLUTION INTRAMUSCULAR; INTRAVENOUS at 13:26

## 2018-05-11 RX ADMIN — PROPOFOL 200 MG: 10 INJECTION, EMULSION INTRAVENOUS at 13:28

## 2018-05-11 RX ADMIN — HYDROMORPHONE HYDROCHLORIDE 1 MG: 1 INJECTION, SOLUTION INTRAMUSCULAR; INTRAVENOUS; SUBCUTANEOUS at 15:14

## 2018-05-11 RX ADMIN — EPHEDRINE SULFATE 10 MG: 50 INJECTION INTRAMUSCULAR; INTRAVENOUS; SUBCUTANEOUS at 13:45

## 2018-05-11 RX ADMIN — ROCURONIUM BROMIDE 30 MG: 10 INJECTION INTRAVENOUS at 13:28

## 2018-05-11 RX ADMIN — Medication 3 MG: at 14:43

## 2018-05-11 RX ADMIN — GABAPENTIN 300 MG: 300 CAPSULE ORAL at 13:06

## 2018-05-11 RX ADMIN — AMLODIPINE BESYLATE 7.5 MG: 5 TABLET ORAL at 19:58

## 2018-05-11 RX ADMIN — ONDANSETRON 4 MG: 2 INJECTION INTRAMUSCULAR; INTRAVENOUS at 13:28

## 2018-05-11 RX ADMIN — EPHEDRINE SULFATE 10 MG: 50 INJECTION INTRAMUSCULAR; INTRAVENOUS; SUBCUTANEOUS at 13:47

## 2018-05-11 RX ADMIN — LIDOCAINE HYDROCHLORIDE 1 ML: 10 INJECTION, SOLUTION INFILTRATION; PERINEURAL at 13:08

## 2018-05-11 RX ADMIN — EPHEDRINE SULFATE 10 MG: 50 INJECTION INTRAMUSCULAR; INTRAVENOUS; SUBCUTANEOUS at 13:49

## 2018-05-11 RX ADMIN — KETOROLAC TROMETHAMINE 30 MG: 30 INJECTION, SOLUTION INTRAMUSCULAR at 14:50

## 2018-05-11 RX ADMIN — DEXAMETHASONE SODIUM PHOSPHATE 4 MG: 4 INJECTION, SOLUTION INTRA-ARTICULAR; INTRALESIONAL; INTRAMUSCULAR; INTRAVENOUS; SOFT TISSUE at 13:28

## 2018-05-11 NOTE — PROGRESS NOTES
[facility] [facility]    Patient admitted to room 4207   at approximately 1710   via cart from surgery. Patient was accompanied by .     spouse  Verbal SBAR report received from PACU   prior to patient arrival.     Patient trasferred to bed via air terri. Patient alert and oriented X 3. The patient is not having any pain.  . Admission vital signs: Blood pressure 124/72, pulse 60, temperature 97.6  F (36.4  C), temperature source Oral, resp. rate 16, last menstrual period 07/01/2002, SpO2 96 %, not currently breastfeeding. Patient was oriented to plan of care, call light, bed controls, tv, telephone, bathroom and visiting hours.     Risk Assessment      The following safety risks were identified during admission: fall. Yellow risk band applied: YES.     Skin Initial Assessment    This writer admitted this patient and completed a full skin assessment and Philipp score in the Adult PCS flowsheet. Appropriate interventions initiated as needed.     Secondary skin check completed by Samson GUTIERREZ.    Skin  Inspection of bony prominences: Procedural focused assessment (identify areas inspected)  Procedural focused assessment (identify areas inspected) :  (Abdomen)  Inspection under devices: Full  Skin WDL: WDL    Philipp Risk Assessment  Sensory Perception: 4-->no impairment  Moisture: 4-->rarely moist  Activity: 2-->chairfast  Mobility: 3-->slightly limited  Nutrition: 3-->adequate  Friction and Shear: 3-->no apparent problem  Philipp Score: 19  Bed Support Surface: Atmos Air mattress    Eli Morel

## 2018-05-11 NOTE — OP NOTE
[facility] Brief Operative Note    Pre-operative diagnosis: Cholelithiasis, history of choledocholithiasis   Post-operative diagnosis Same, chronic cholecystitis   Procedure: Procedure(s):  Laparoscopic Cholecystectomy with Intraoperative Cholangiogram - Wound Class: II-Clean Contaminated   Surgeon: Erick Rivera MD   Assistants(s): Erick Pereira PAC assisted with camera holding, retraction and skin closure   Estimated blood loss: 400 ml    Specimens: gallbladder   Findings: Bleeding from muscle/artery near umbilcus controlled with sutures passed with rod thomasen. Gallbladder not inflamed but with adhesions to omentum and duodenum. IOC good flow into duodenum no filling defects seen     Dict# [id number]

## 2018-05-11 NOTE — ANESTHESIA POSTPROCEDURE EVALUATION
Patient: Donita Lerner    Procedure(s):  Laparoscopic Cholecystectomy with Intraoperative Cholangiogram - Wound Class: II-Clean Contaminated    Diagnosis:cholelithiasis  Diagnosis Additional Information: No value filed.    Anesthesia Type:  General, ETT    Note:  Anesthesia Post Evaluation    Patient location during evaluation: Bedside  Patient participation: Able to fully participate in evaluation  Level of consciousness: awake and alert  Pain management: adequate  Airway patency: patent  Cardiovascular status: acceptable  Respiratory status: acceptable  Hydration status: acceptable  PONV: none     Anesthetic complications: None          Last vitals:  Vitals:    05/11/18 1237 05/11/18 1523   BP: 143/88 127/77   Pulse: 57    Resp: 16 16   Temp: 36.4  C (97.5  F) 36.4  C (97.6  F)   SpO2: 98% 98%         Electronically Signed By: TERRA Cancino CRNA  May 11, 2018  3:26 PM

## 2018-05-11 NOTE — IP AVS SNAPSHOT
[facility]    [address] [address]    Phone:  [phone]    Fax:  [phone]                                       After Visit Summary   5/11/2018    Donita Lerner    MRN: [id number]           After Visit Summary Signature Page     I have received my discharge instructions, and my questions have been answered. I have discussed any challenges I see with this plan with the nurse or doctor.    ..........................................................................................................................................  Patient/Patient Representative Signature      ..........................................................................................................................................  Patient Representative Print Name and Relationship to Patient    ..................................................               ................................................  Date                                            Time    ..........................................................................................................................................  Reviewed by Signature/Title    ...................................................              ..............................................  Date                                                            Time

## 2018-05-11 NOTE — OP NOTE
Procedure Date: 05/11/2018      STAFF SURGEON:  Erick Rivera MD      ASSISTANTS:  LISANDRO Ruiz, who assisted with camera holding, retraction and skin closure at case completion.      PREOPERATIVE DIAGNOSES:  Cholelithiasis and history of choledocholithiasis.      POSTOPERATIVE DIAGNOSES:  Osteoarthritis, history of choledocholithiasis and chronic cholecystitis.      PROCEDURE PERFORMED:  Laparoscopic cholecystectomy with intraoperative cholangiogram.      INDICATIONS FOR PROCEDURE:  The patient is a 62-year-old female I had seen recently in consultation in clinic with upper abdominal pains.  This seemed to be postprandial.  She had an episode of elevated alkaline phosphatase, ALT and AST with subsequent decrease since her pain subsided.  Ultrasound showed cholelithiasis.  Based on her symptoms, she was suspected to have at least an episode of choledocholithiasis, seemed like the stone perhaps had passed.  She was offered laparoscopic cholecystectomy and wanted to do an intraoperative cholangiogram to ensure the duct was clear given her above presentation.  The risks, benefits and alternatives discussed preoperatively and consent obtained to proceed.      TYPE OF ANESTHESIA:  General.      COMPLICATIONS:  Bleeding from a muscle artery near the umbilicus.  This had to be controlled with a few sutures passed with a Jesus-Kerline suture passer device.  This bleeding did not affect patient hemodynamics.      ESTIMATED BLOOD LOSS:  400 mL.      DRAINS:  None.      SPECIMEN:  Gallbladder.      IMPLANTS:  None.      OPERATIVE FINDINGS:  As described above, the patient had bleeding from what appeared to be in the muscle or an artery within the muscle, right there at the umbilicus from where we had entered with the George.  In order to get control, I had to pass sutures around the area, but control was obtained and no further bleeding noted through the remainder of the case or at case completion.  The gallbladder  itself did not appear acutely inflamed.  There were adhesions to omentum and duodenum consistent with some chronic cholecystitis.  The intraoperative cholangiogram showed good flow into the duodenum.  No obvious filling defects seen.      PROCEDURE IN DETAIL:  Following consent, the patient was brought from the preoperative holding operative suite and laid in supine position.  She underwent general anesthesia with endotracheal intubation without difficulty.  The abdomen was prepped and draped in the usual fashion.  Timeout procedure performed.  She received preoperative antibiotics and had sequentials for DVT prophylaxis.  Following the timeout, I began with about a 2 cm infraumbilical incision vertically to make use of a scar from previous surgery.  I dissected through the abdominal fascia.  This was elevated between 2 Kochers and divided with curved Allison scissors.  It was noted that there was bleeding that seemed to well up fairly quickly.  I attempted to get some control over the fascial edge with cautery, but this did not seem to control it.  I was able to insert the George long enough to get some insufflation and then placed additional working ports in the subxiphoid and right upper quadrant in order to then look down towards the umbilicus with the laparoscope.  Once we were able to do that, it looked like this bleeding was coming from the cut muscle edge, but did appear to be arterial with ongoing bleeding.  Since we were able to have visualization here laparoscopically, I used a Jesus-Kerline suture passer device to pass an 0 Vicryl suture around the area of bleeding through a small stab incision was actually more inferior and to the left of the umbilicus, probably about 3-4 cm as well as one that was kind of closer by the incision.  This seemed to stop any bleeding.  Using the suction to get free some of the clot, I then continued on with the cholecystectomy.  The additional right abdomen working port was  then placed at this time.  The patient was positioned in reverse Trendelenburg, rotated to the left.  The gallbladder fundus elevated towards the right shoulder, infundibulum laterally.  There were some adhesions to omentum and duodenum.  These were taken down with electrocautery, freeing the infundibulum more and allowing exposure of the triangle of Calot.  I used the hook cautery to then begin dissecting the fatty tissues here.  Cystic duct and cystic artery were able to be identified and circumferentially dissected and critical view of safety obtained.  I placed a clip on the cystic duct proximally by the gallbladder, then made a ductotomy with bile return.  I then threaded  the cholangiogram catheter through this incision and clipped that into place.  We brought in the C-arm and fluoroscopy used for our intraoperative cholangiogram once the patient was leveled out.  The images showed good flow through the duct into the duodenum.  I did not see any filling defects.  Happy with this, the patient was repositioned back in reverse Trendelenburg rotated to the left and the C-arm was removed.  The cholangiocatheter was removed and the distal portion of the cystic duct then doubly clipped, the cystic artery clipped and then both were divided.  I used hook cautery to continue removing the remaining attachments of the gallbladder to liver bed until this was freed.  It was placed in an EndoCatch bag, removed through the umbilical incision and passed off for pathology.  Palpation of the gallbladder did not reveal any large stones or other abnormalities.  I then inspected the operative field.  There was no bleeding from the liver bed or down where the clips were.  There was blood from the previous bleeding that we were suctioning free, some up in the right upper quadrant and the right gutter, which was irrigated and suctioned until this was almost gone.  We then moved down to the pelvis where some other blood pooling was  noted.  We got this all suctioned as best as able.  The area of the umbilicus again was reinspected here at case completion and still no bleeding was noted.  Therefore, the 5 mm ports were then removed under direct vision, followed by desufflation of the abdomen and removal of the umbilical George.  The umbilical fascial defect was then closed with the 0 Vicryl stay sutures and skin closed with 4-0 Monocryl and covered with Dermabond.  We used a total of 30 mL of 0.5% Marcaine with epinephrine 1:200,000 for local anesthetic.  The patient tolerated the procedure well.  As I said, bleeding did not affect her hemodynamics.  She was then discharged to recovery with plans for overnight observation given her bleeding.         BOYD VALDEZ MD             D: 2018   T: 2018   MT: CARLY      Name:     FREYA DELEON   MRN:      [id number]        Account:        [id number]   :      1955           Procedure Date: 2018      Document: [id number]

## 2018-05-11 NOTE — IP AVS SNAPSHOT
MRN:[id number]                      After Visit Summary   5/11/2018    Donita Lerner    MRN: [id number]           Thank you!     Thank you for choosing [facility] for your care. Our goal is always to provide you with excellent care. Hearing back from our patients is one way we can continue to improve our services. Please take a few minutes to complete the written survey that you may receive in the mail after you visit with us. Thank you!        Patient Information     Date Of Birth          1955        Designated Caregiver       Most Recent Value    Caregiver    Will someone help with your care after discharge? no      About your hospital stay     You were admitted on:  May 11, 2018 You last received care in the:  [facility]    You were discharged on:  May 12, 2018       Who to Call     For medical emergencies, please call 911.  For non-urgent questions about your medical care, please call your primary care provider or clinic, [phone]  For questions related to your surgery, please call your surgery clinic        Attending Provider     Provider Boyd Grace MD Surgery       Primary Care Provider Office Phone # Fax #    Ami Edwards TERRA [facility] [phone]      After Care Instructions     Diet Instructions       Low fat diet            Discharge Instructions       Discharge Instructions    DISCHARGE INSTRUCTIONS  DR. BOYD VALDEZ  1. You may resume your regular diet when you feel you are ready to. DO NOT drink alcoholic beverages for  24 hours of while you are taking prescription medication.  2. Limit your activities for the first 48 hours. Gradually, increase them as tolerated. You may use stairs.  I encourage you to walk as tolerated.  3. You will have some discomfort at the incision sites. This is expected. This should improve over the next  2-3 days. Ice and pain medication will help with this pain. Use prescribed pain  medication as instructed.  4. Bruising and mild swelling is normal after surgery. The area below and around the incision(s) will be hard and  elevated. This is part of the normal healing process. This will resolve slowly over the next several months.  If you feel the pain is increasing and cannot explain it by increasing activity please call us at [phone]  5. Your wounds may be covered with glue. The glue is water tight and so you can shower the day following surgery. Wait at least 48 hours to allow the skin beneath to seal before submerging in a bath tub or pool. If glue was not used wait 48 hours before bathing.  6. Avoid Aspirin for the first 72 hours after the procedure. This medication may increase the tendency to bleed.  7. Use the following medications (in addition to your normal meds) as shown:  Name of Medicine Dose Frequency Reason  a. Percocet 5 mg 1-2 every 6 hours as needed for pain. This contains 325 mg of Tylenol (acetaminophen)  per tablet. For example, you may take 1 Percocet and 1 Tylenol, or 2 Percocet and no Tylenol,  or 2 Tylenol and no Percocet every 6 hours.  b. Tylenol (acetaminophen) 500 mg every 6 hours as needed for pain. Do not take more than 1000 mg  every 6 hours. (see above)  c. Motrin (ibuprophen) 200-800 mg every 6 hours as needed for pain. Take with food.  8. Notify [facility] at [phone] if:     Your discomfort is not relieved by your pain medication     You have signs of infection such as temperature above 100.5 degrees orally, chills, or  increasing daily discomfort.     Incision site is becoming more red and/or there is purulent drainage.     You have questions or concerns  9. Please call [phone] to schedule a follow up appointment in 2 weeks(s)  10. When taking narcotics (pain medication more than Tylenol (acetaminophen) and Motrin (ibuprophen) it is  important to keep your stools soft to avoid constipation and pain with straining. This is best  done by drinking  fluids (nonalcoholic and non-caffeinated) and taking a stool softener i.e. Metamucil or milk of magnesia.  You may be able to use non-narcotics for pain relief especially by the 3rd post- operative day. [id number] mg  every 6 hours and/or Motrin (ibuprophen) 200-800 mg every 6 hours. Please do not take more than 4 grams  of Tylenol (acetaminophen) per day. Remember your Percocet does have Tylenol (acetaminophen) already  in it. If you have a history of stomach ulcers or stomach problems than do not take the Motrin (ibuprophen).  Please take Motrin (ibuprophen) with food to help protect the stomach.  11. Do not drive or operate heavy machinery for 24 hours after surgery or when taking narcotics. You may  resume driving when feel that you can safely avoid an accident and are not taking narcotics. This is usually  5 to 7 days after surgery. You should not be alone for 24 hours after surgery.  Discharge Owner: Dr Rivera  Date of Origin: 11/06  Revised/Reviewed:8/15            Discharge Instructions       Patient to follow up with appointment in 2 weeks            Ice to affected area       Ice to operative site PRN            No Alcohol       For 24 hours post procedure            No driving or operating machinery        until the day after procedure            Shower       May shower Postoperative Day (POD)  1                  Your next 10 appointments already scheduled     May 14, 2018  4:00 PM CDT   Nurse Only with TURNER BELL RN   [facility]    [address] [place] [address] [id number] [phone]              Further instructions from your care team                         Same Day Surgery Discharge Instructions  Special Precautions After Surgery - Adult    1. It is not unusual to feel lightheaded or faint, up to 24 hours after surgery or while taking pain medication.  If you have these symptoms; sit for a few minutes before standing and have someone assist  you when getting up.  2. You should rest and relax for the next 24 hours and must have someone stay with you for at least 24 hours after your discharge.  3. DO NOT DRIVE any vehicle or operate mechanical equipment for 24 hours following the end of your surgery.  DO NOT DRIVE while taking narcotic pain medications that have been prescribed by your physician.  If you had a limb operated on, you must be able to use it fully to drive.  4. DO NOT drink alcoholic beverages for 24 hours following surgery or while taking prescription pain medication.  5. Drink clear liquids (apple juice, ginger ale, broth, 7-Up, etc.).  Progress to your regular diet as you feel able.  6. Any questions call your physician and do not make important decisions for 24 hours.    Surgery Specialty Clinic:  [phone]     Same Day Surgery [phone], Monday thru Friday 6am-9pm.    Break through Bleeding  As instructed per Surgeon or Nurse.    Post Op Infection  Be alert for signs of infection: redness, swelling, heat, drainage of pus, and/or elevated temperature.  Contact your surgeon if these occur.    Nausea   If post op nausea occurs, at first rest your stomach for a few hours by eating nothing solid and sipping only clear liquids.  Call your Surgeon if nausea does not resolve in 24 hours.        Pending Results     Date and Time Order Name Status Description    5/11/2018 1441 Surgical pathology exam In process             Statement of Approval     Ordered          05/12/18 0931  I have reviewed and agree with all the recommendations and orders detailed in this document.  EFFECTIVE NOW     Approved and electronically signed by:  Erick Rivera MD             Admission Information     Date & Time Provider Department Dept. Phone    5/11/2018 Erick Rivera MD [facility] [phone]      Your Vitals Were     Blood Pressure Pulse Temperature Respirations Last Period Pulse Oximetry    116/63 (BP Location:  Left arm) 58 98.5  F (36.9  C) (Oral) 16 07/01/2002 92%      [facility] Information     [facility] gives you secure access to your electronic health record. If you see a primary care provider, you can also send messages to your care team and make appointments. If you have questions, please call your primary care clinic.  If you do not have a primary care provider, please call [phone] and they will assist you.        Care EveryWhere ID     This is your Care EveryWhere ID. This could be used by other organizations to access your [facility] medical records  [id number]        Equal Access to Services     [facility] : Hadkusum Graham, dakota ortega, nettie petit, sonia varner . [facility] [phone].    ATENCIÓN: Si habla español, tiene a escobar disposición servicios gratuitos de asistencia lingüística. Llame al [phone].    We comply with applicable federal civil rights laws and [place] laws. We do not discriminate on the basis of race, color, national origin, age, disability, sex, sexual orientation, or gender identity.               Review of your medicines      START taking        Dose / Directions    oxyCODONE-acetaminophen 5-325 MG per tablet   Commonly known as:  PERCOCET   Used for:  Calculus of gallbladder without cholecystitis without obstruction   Notes to Patient:  Not given while hospitalized        Dose:  1-2 tablet   Take 1-2 tablets by mouth every 4 hours as needed for pain (moderate to severe)   Quantity:  20 tablet   Refills:  0         CONTINUE these medicines which may have CHANGED, or have new prescriptions. If we are uncertain of the size of tablets/capsules you have at home, strength may be listed as something that might have changed.        Dose / Directions    vitamin D 2000 units tablet   This may have changed:  when to take this   Used for:  Routine general medical examination at a health care facility   Notes to Patient:  Not given,  resume        Dose:  2000 Units   Take 2,000 Units by mouth daily   Quantity:  100 tablet   Refills:  3         CONTINUE these medicines which have NOT CHANGED        Dose / Directions    amLODIPine 5 MG tablet   Commonly known as:  NORVASC   Used for:  Essential hypertension, benign        Dose:  7.5 mg   Take 1.5 tablets (7.5 mg) by mouth daily   Quantity:  135 tablet   Refills:  3       aspirin 81 MG tablet   Notes to Patient:  Not given, resume 72 hours after surgery        Dose:  81 mg   Take 1 tablet (81 mg) by mouth daily   Quantity:  90 tablet   Refills:  3       buPROPion 150 MG 24 hr tablet   Commonly known as:  WELLBUTRIN XL   Used for:  Anxiety        TAKE ONE TABLET BY MOUTH ONCE DAILY IN THE MORNING   Quantity:  90 tablet   Refills:  0       escitalopram 20 MG tablet   Commonly known as:  LEXAPRO   Used for:  Anxiety        Dose:  20 mg   Take 1 tablet (20 mg) by mouth daily   Quantity:  60 tablet   Refills:  0       metoprolol succinate 25 MG 24 hr tablet   Commonly known as:  TOPROL-XL   Used for:  Essential hypertension, benign        Dose:  25 mg   Take 1 tablet (25 mg) by mouth daily   Quantity:  90 tablet   Refills:  3       naltrexone 50 MG tablet   Commonly known as:  DEPADE;REVIA   Used for:  Morbid obesity, unspecified obesity type (H)   Notes to Patient:  Not given        Take 1/2 tablet.  Time it one to two hours prior to worst cravings.  Then increase to one full tablet as instructed.   Quantity:  30 tablet   Refills:  3            Where to get your medicines      Some of these will need a paper prescription and others can be bought over the counter. Ask your nurse if you have questions.     Bring a paper prescription for each of these medications     oxyCODONE-acetaminophen 5-325 MG per tablet                Protect others around you: Learn how to safely use, store and throw away your medicines at www.disposemymeds.org.        Information about OPIOIDS     PRESCRIPTION OPIOIDS: WHAT YOU  NEED TO KNOW   You have a prescription for an opioid (narcotic) pain medicine. Opioids can cause addiction. If you have a history of chemical dependency of any type, you are at a higher risk of becoming addicted to opioids. Only take this medicine after all other options have been tried. Take it for as short a time and as few doses as possible.     Do not:    Drive. If you drive while taking these medicines, you could be arrested for driving under the influence (DUI).    Operate heavy machinery    Do any other dangerous activities while taking these medicines.     Drink any alcohol while taking these medicines.      Take with any other medicines that contain acetaminophen. Read all labels carefully. Look for the word  acetaminophen  or  Tylenol.  Ask your pharmacist if you have questions or are unsure.    Store your pills in a secure place, locked if possible. We will not replace any lost or stolen medicine. If you don t finish your medicine, please throw away (dispose) as directed by your pharmacist. The [place] Pollution Control Agency has more information about safe disposal: https://www.pca.[facility].[place].us/living-green/managing-unwanted-medications    All opioids tend to cause constipation. Drink plenty of water and eat foods that have a lot of fiber, such as fruits, vegetables, prune juice, apple juice and high-fiber cereal. Take a laxative (Miralax, milk of magnesia, Colace, Senna) if you don t move your bowels at least every other day.              Medication List: This is a list of all your medications and when to take them. Check marks below indicate your daily home schedule. Keep this list as a reference.      Medications           Morning Afternoon Evening Bedtime As Needed    amLODIPine 5 MG tablet   Commonly known as:  NORVASC   Take 1.5 tablets (7.5 mg) by mouth daily   Last time this was given:  7.5 mg on 5/11/2018  7:58 PM   Next Dose Due:  05/12/18                                   aspirin 81 MG  tablet   Take 1 tablet (81 mg) by mouth daily   Notes to Patient:  Not given, resume 72 hours after surgery                                buPROPion 150 MG 24 hr tablet   Commonly known as:  WELLBUTRIN XL   TAKE ONE TABLET BY MOUTH ONCE DAILY IN THE MORNING   Last time this was given:  150 mg on 5/12/2018  8:38 AM   Next Dose Due:  05/13/18                                   escitalopram 20 MG tablet   Commonly known as:  LEXAPRO   Take 1 tablet (20 mg) by mouth daily   Last time this was given:  20 mg on 5/12/2018  8:38 AM   Next Dose Due:  05/13/18                                   metoprolol succinate 25 MG 24 hr tablet   Commonly known as:  TOPROL-XL   Take 1 tablet (25 mg) by mouth daily   Next Dose Due:  05/12/18                                   naltrexone 50 MG tablet   Commonly known as:  DEPADE;REVIA   Take 1/2 tablet.  Time it one to two hours prior to worst cravings.  Then increase to one full tablet as instructed.   Notes to Patient:  Not given                                oxyCODONE-acetaminophen 5-325 MG per tablet   Commonly known as:  PERCOCET   Take 1-2 tablets by mouth every 4 hours as needed for pain (moderate to severe)   Notes to Patient:  Not given while hospitalized                                   vitamin D 2000 units tablet   Take 2,000 Units by mouth daily   Notes to Patient:  Not given, resume

## 2018-05-11 NOTE — ANESTHESIA PREPROCEDURE EVALUATION
Anesthesia Evaluation     . Pt has had prior anesthetic.            ROS/MED HX    ENT/Pulmonary:       Neurologic:       Cardiovascular:     (+) Dyslipidemia, hypertension----. : . . . :. .       METS/Exercise Tolerance:     Hematologic:         Musculoskeletal:         GI/Hepatic:         Renal/Genitourinary:     (+) Nephrolithiasis ,       Endo:     (+) Obesity, .      Psychiatric:     (+) psychiatric history anxiety      Infectious Disease:         Malignancy:         Other:                     Physical Exam  Normal systems: cardiovascular, pulmonary and dental    Airway   Mallampati: I  TM distance: >3 FB  Neck ROM: full    Dental     Cardiovascular   Rhythm and rate: regular and normal      Pulmonary    breath sounds clear to auscultation                    Anesthesia Plan      History & Physical Review  History and physical reviewed and following examination; no interval change.    ASA Status:  3 .    NPO Status:  > 6 hours    Plan for General and ETT with Intravenous induction. Maintenance will be Balanced.    PONV prophylaxis:  Ondansetron (or other 5HT-3) and Dexamethasone or Solumedrol       Postoperative Care      Consents  Anesthetic plan, risks, benefits and alternatives discussed with:  Patient..                          .

## 2018-05-12 VITALS
TEMPERATURE: 98.5 F | DIASTOLIC BLOOD PRESSURE: 63 MMHG | OXYGEN SATURATION: 92 % | RESPIRATION RATE: 16 BRPM | SYSTOLIC BLOOD PRESSURE: 116 MMHG | HEART RATE: 58 BPM

## 2018-05-12 LAB — HGB BLD-MCNC: 12.9 G/DL (ref 11.7–15.7)

## 2018-05-12 PROCEDURE — 25000132 ZZH RX MED GY IP 250 OP 250 PS 637: Performed by: SURGERY

## 2018-05-12 PROCEDURE — 85018 HEMOGLOBIN: CPT | Performed by: SURGERY

## 2018-05-12 PROCEDURE — G0378 HOSPITAL OBSERVATION PER HR: HCPCS

## 2018-05-12 PROCEDURE — 36415 COLL VENOUS BLD VENIPUNCTURE: CPT | Performed by: SURGERY

## 2018-05-12 RX ADMIN — IBUPROFEN 600 MG: 600 TABLET ORAL at 04:45

## 2018-05-12 RX ADMIN — BUPROPION HYDROCHLORIDE 150 MG: 150 TABLET, FILM COATED, EXTENDED RELEASE ORAL at 08:38

## 2018-05-12 RX ADMIN — ESCITALOPRAM OXALATE 20 MG: 20 TABLET ORAL at 08:38

## 2018-05-12 NOTE — PLAN OF CARE
Problem: Patient Care Overview  Goal: Plan of Care/Patient Progress Review  Outcome: Improving  Patient up with stand by assist to bathroom. Voided 300 ml dirk urine. Encouraged increased oral intake. Incisions are clean, dry and intact, ice applied. Patient denies having any pain. Patient oxygen level dropping to 89-90 overnight. 1 lpm oxygen applied via nasal cannula with saturation 93-94 %, encouraged coughing and deep breathing. Patient tolerating regular diet.   Bed alarm on for safety.     After getting up to bathroom patient stated she had some pain with movement. Ibuprofen administered, patient did not want anything stronger.

## 2018-05-12 NOTE — DISCHARGE INSTRUCTIONS
Same Day Surgery Discharge Instructions  Special Precautions After Surgery - Adult    1. It is not unusual to feel lightheaded or faint, up to 24 hours after surgery or while taking pain medication.  If you have these symptoms; sit for a few minutes before standing and have someone assist you when getting up.  2. You should rest and relax for the next 24 hours and must have someone stay with you for at least 24 hours after your discharge.  3. DO NOT DRIVE any vehicle or operate mechanical equipment for 24 hours following the end of your surgery.  DO NOT DRIVE while taking narcotic pain medications that have been prescribed by your physician.  If you had a limb operated on, you must be able to use it fully to drive.  4. DO NOT drink alcoholic beverages for 24 hours following surgery or while taking prescription pain medication.  5. Drink clear liquids (apple juice, ginger ale, broth, 7-Up, etc.).  Progress to your regular diet as you feel able.  6. Any questions call your physician and do not make important decisions for 24 hours.    Surgery Specialty Clinic:  [phone]     Same Day Surgery [phone], Monday thru Friday 6am-9pm.    Break through Bleeding  As instructed per Surgeon or Nurse.    Post Op Infection  Be alert for signs of infection: redness, swelling, heat, drainage of pus, and/or elevated temperature.  Contact your surgeon if these occur.    Nausea   If post op nausea occurs, at first rest your stomach for a few hours by eating nothing solid and sipping only clear liquids.  Call your Surgeon if nausea does not resolve in 24 hours.

## 2018-05-12 NOTE — PROGRESS NOTES
"General Surgery    S: Feeling great, only needed ibuprofen for pain. Tolerating PO, ambulated some    O:  Vital signs:  Temp: 98.5  F (36.9  C) Temp src: Oral BP: 116/63 Pulse: 58 Heart Rate: 57 Resp: 16 SpO2: 92 % O2 Device: None (Room air) Oxygen Delivery: 1 LPM      Estimated body mass index is 41.66 kg/(m^2) as calculated from the following:    Height as of 5/9/18: 1.715 m (5' 7.5\").    Weight as of 5/9/18: 122.5 kg (270 lb).      Gen: AAOx3, NAD  Heart: RRR  Lungs: CTA  Abd: Soft, non-distended. Non-tender. Mild bruising umbilical area. Incisions c/d/i    Labs/Imaging  Results for orders placed or performed during the hospital encounter of 05/11/18 (from the past 24 hour(s))   XR Surgery GOLDIE Fluoro L/T 5 Min w Stills    Narrative    SURGERY C-ARM FLUOROSCOPY LESS THAN FIVE MINUTES WITH STILLS   5/11/2018 2:47 PM     COMPARISON: None.    HISTORY: Intraoperative cholangiogram    NUMBER OF IMAGES ACQUIRED: 2    VIEWS: 2    FLUOROSCOPY TIME: .18 minutes.      Impression    IMPRESSION: Dilated intrahepatic and common hepatic ducts and common  bile duct. No choledocholithiasis.    JAZ ALEXANDER MD   Hemoglobin   Result Value Ref Range    Hemoglobin 14.0 11.7 - 15.7 g/dL   Hemoglobin   Result Value Ref Range    Hemoglobin 12.9 11.7 - 15.7 g/dL   Care Transition RN/SW IP Consult    Narrative    Dea Sy RN     5/12/2018  9:27 AM  Care Transitions:  Patient has  [place] to Observation  order. Patient has been   given Patient Bill of Rights, Observation brochure and  What does   Observation mean to me  forms.  Patient has been given the   opportunity to ask questions about observation status and their   plan of care.   Dea Sy RN, Care Coordinator [phone]           A: s/p lap louann, had bleeding from vessel or muscle near umbilicus controlled intraop. Doing well, stable hemodynamics overnight    P: Plan to DC home today    Erick Rivera MD      "

## 2018-05-12 NOTE — CONSULTS
Care Transitions:  Patient has  [place] to Observation  order. Patient has been given Patient Bill of Rights, Observation brochure and  What does Observation mean to me  forms.  Patient has been given the opportunity to ask questions about observation status and their plan of care.   Dea Sy RN, Care Coordinator [phone]

## 2018-05-12 NOTE — PLAN OF CARE
Problem: Patient Care Overview  Goal: Plan of Care/Patient Progress Review  Outcome: Adequate for Discharge Date Met: 05/12/18  Patient was up ad erich in room, voiding well. Ate well for breakfast, good (po) fluid intake. Pain controlled with ibuprofen and ice to incision.     [place] NSG DISCHARGE NOTE    Patient discharged to home at 10:10 AM via wheel chair. Accompanied by staff. Discharge instructions reviewed with patient, opportunity offered to ask questions. Prescriptions sent to patients preferred pharmacy. All belongings sent with patient.    Ashli Vernon

## 2018-05-14 NOTE — DISCHARGE SUMMARY
Discharge Summary    RFA: post laparoscopic cholecystectomy, bleeding    Principal diagnosis: cholelithiasis    Procedures: laparoscopic cholecystectomy with intraoperative cholangiogram  Had song-umbilical bleeding appeared controlled during procedure    Hospital course  Patient admitted following above procedure for monitoring given her intraop bleeding. Hemodynamics remained stable. Hemoglobin stable. Patient recovered satisfactorily and was able to discharge home the morning of POD#1     Donita Lerner   Home Medication Instructions NYA:[id number]    Printed on:05/14/18 6627   Medication Information                      amLODIPine (NORVASC) 5 MG tablet  Take 1.5 tablets (7.5 mg) by mouth daily             aspirin 81 MG tablet  Take 1 tablet (81 mg) by mouth daily             buPROPion (WELLBUTRIN XL) 150 MG 24 hr tablet  TAKE ONE TABLET BY MOUTH ONCE DAILY IN THE MORNING             Cholecalciferol (VITAMIN D) 2000 UNITS tablet  Take 2,000 Units by mouth daily             escitalopram (LEXAPRO) 20 MG tablet  Take 1 tablet (20 mg) by mouth daily             metoprolol succinate (TOPROL-XL) 25 MG 24 hr tablet  Take 1 tablet (25 mg) by mouth daily             naltrexone (DEPADE;REVIA) 50 MG tablet  Take 1/2 tablet.  Time it one to two hours prior to worst cravings.  Then increase to one full tablet as instructed.             oxyCODONE-acetaminophen (PERCOCET) 5-325 MG per tablet  Take 1-2 tablets by mouth every 4 hours as needed for pain (moderate to severe)                 No driving or operating machinery    until the day after procedure     No Alcohol   For 24 hours post procedure     Diet Instructions   Low fat diet     Shower   May shower Postoperative Day (POD)  1     Ice to affected area   Ice to operative site PRN     Discharge Instructions   Discharge Instructions    DISCHARGE INSTRUCTIONS  DR. BOYD VALDEZ  1. You may resume your regular diet when you feel you are ready to. DO NOT drink alcoholic  beverages for  24 hours of while you are taking prescription medication.  2. Limit your activities for the first 48 hours. Gradually, increase them as tolerated. You may use stairs.  I encourage you to walk as tolerated.  3. You will have some discomfort at the incision sites. This is expected. This should improve over the next  2-3 days. Ice and pain medication will help with this pain. Use prescribed pain medication as instructed.  4. Bruising and mild swelling is normal after surgery. The area below and around the incision(s) will be hard and  elevated. This is part of the normal healing process. This will resolve slowly over the next several months.  If you feel the pain is increasing and cannot explain it by increasing activity please call us at [phone]  5. Your wounds may be covered with glue. The glue is water tight and so you can shower the day following surgery. Wait at least 48 hours to allow the skin beneath to seal before submerging in a bath tub or pool. If glue was not used wait 48 hours before bathing.  6. Avoid Aspirin for the first 72 hours after the procedure. This medication may increase the tendency to bleed.  7. Use the following medications (in addition to your normal meds) as shown:  Name of Medicine Dose Frequency Reason  a. Percocet 5 mg 1-2 every 6 hours as needed for pain. This contains 325 mg of Tylenol (acetaminophen)  per tablet. For example, you may take 1 Percocet and 1 Tylenol, or 2 Percocet and no Tylenol,  or 2 Tylenol and no Percocet every 6 hours.  b. Tylenol (acetaminophen) 500 mg every 6 hours as needed for pain. Do not take more than 1000 mg  every 6 hours. (see above)  c. Motrin (ibuprophen) 200-800 mg every 6 hours as needed for pain. Take with food.  8. Notify [facility] at [phone] if:     Your discomfort is not relieved by your pain medication     You have signs of infection such as temperature above 100.5 degrees orally, chills, or  increasing  daily discomfort.     Incision site is becoming more red and/or there is purulent drainage.     You have questions or concerns  9. Please call [phone] to schedule a follow up appointment in 2 weeks(s)  10. When taking narcotics (pain medication more than Tylenol (acetaminophen) and Motrin (ibuprophen) it is  important to keep your stools soft to avoid constipation and pain with straining. This is best done by drinking  fluids (nonalcoholic and non-caffeinated) and taking a stool softener i.e. Metamucil or milk of magnesia.  You may be able to use non-narcotics for pain relief especially by the 3rd post- operative day. Weigeme503 mg  every 6 hours and/or Motrin (ibuprophen) 200-800 mg every 6 hours. Please do not take more than 4 grams  of Tylenol (acetaminophen) per day. Remember your Percocet does have Tylenol (acetaminophen) already  in it. If you have a history of stomach ulcers or stomach problems than do not take the Motrin (ibuprophen).  Please take Motrin (ibuprophen) with food to help protect the stomach.  11. Do not drive or operate heavy machinery for 24 hours after surgery or when taking narcotics. You may  resume driving when feel that you can safely avoid an accident and are not taking narcotics. This is usually  5 to 7 days after surgery. You should not be alone for 24 hours after surgery.  Discharge Owner: Dr Rivera  Date of Origin: 11/06  Revised/Reviewed:8/15     Discharge Instructions   Patient to follow up with appointment in 2 weeks

## 2018-05-15 ENCOUNTER — TELEPHONE (OUTPATIENT)
Dept: FAMILY MEDICINE | Facility: CLINIC | Age: 63
End: 2018-05-15

## 2018-05-15 NOTE — TELEPHONE ENCOUNTER
"ED/Discharge Protocol    \"Hi, my name is Lennie Taylor, a registered nurse, and I am calling on behalf of Dr. Augustine's office at [place].  I am calling to follow up and see how things are going for you after your recent visit.\"    \"I see that you were in the (ER/UC/IP) on 5-11-18.    How are you doing now that you are home?\" good    Is patient experiencing symptoms that may require a hospital visit?  no    Discharge Instructions    \"Let's review your discharge instructions.  What is/are the follow-up recommendations?  Pt. Response: F/u with surgeon    \"Were you instructed to make a follow-up appointment?\"  Pt. Response: Yes.  Has appointment been made?   Yes      \"When you see the provider, I would recommend that you bring your discharge instructions with you.    Medications    \"How many new medications are you on since your hospitalization/ED visit?\"    0-1  \"How many of your current medicines changed (dose, timing, name, etc.) while you were in the hospital/ED visit?\"   0-1  \"Do you have questions about your medications?\"   No  \"Were you newly diagnosed with heart failure, COPD, diabetes or did you have a heart attack?\"   No  For patients on insulin: \"Did you start on insulin in the hospital or did you have your insulin dose changed?\"   No    Medication reconciliation completed? Yes    Was MTM referral placed (*Make sure to put transitions as reason for referral)?   No    Call Summary    \"Do you have any questions or concerns about your condition or care plan at the moment?\"    No  Triage nurse advice given: Encouraged to call with any questions or concerns.     Patient was in ER 1 in the past year (assess appropriateness of ER visits.)      \"If you have questions or things don't continue to improve, we encourage you contact us through the main clinic number,  [phone].  Even if the clinic is not open, triage nurses are available 24/7 to help you.     We would like you to know that our clinic has extended " "hours (provide information).  We also have urgent care (provide details on closest location and hours/contact info)\"      \"Thank you for your time and take care!\"        "

## 2018-05-15 NOTE — TELEPHONE ENCOUNTER
ED/UC/IP follow up phone call:    RN please call to follow up.    Number of ED visits in past 12 months = 0  Car[facility]

## 2018-05-17 LAB — COPATH REPORT: NORMAL

## 2018-05-23 DIAGNOSIS — I10 ESSENTIAL HYPERTENSION, BENIGN: ICD-10-CM

## 2018-05-23 RX ORDER — AMLODIPINE BESYLATE 5 MG/1
TABLET ORAL
Qty: 90 TABLET | Refills: 1 | Status: SHIPPED | OUTPATIENT
Start: 2018-05-23 | End: 2019-02-01

## 2018-05-29 ENCOUNTER — OFFICE VISIT (OUTPATIENT)
Dept: SURGERY | Facility: CLINIC | Age: 63
End: 2018-05-29
Payer: COMMERCIAL

## 2018-05-29 VITALS — RESPIRATION RATE: 16 BRPM | DIASTOLIC BLOOD PRESSURE: 59 MMHG | HEART RATE: 63 BPM | SYSTOLIC BLOOD PRESSURE: 110 MMHG

## 2018-05-29 DIAGNOSIS — Z90.49 S/P LAPAROSCOPIC CHOLECYSTECTOMY: Primary | ICD-10-CM

## 2018-05-29 PROCEDURE — 99024 POSTOP FOLLOW-UP VISIT: CPT | Performed by: SURGERY

## 2018-05-29 NOTE — MR AVS SNAPSHOT
After Visit Summary   5/29/2018    Donita Lerner    MRN: [id number]           Patient Information     Date Of Birth          1955        Visit Information        Provider Department      5/29/2018 12:30 PM Erick Rivera MD [facility]        Today's Diagnoses     S/P laparoscopic cholecystectomy    -  1       Follow-ups after your visit        Who to contact     If you have questions or need follow up information about today's clinic visit or your schedule please contact [facility] directly at [phone].  Normal or non-critical lab and imaging results will be communicated to you by Tap[employer], letter or phone within 4 business days after the clinic has received the results. If you do not hear from us within 7 days, please contact the clinic through [employer] or phone. If you have a critical or abnormal lab result, we will notify you by phone as soon as possible.  Submit refill requests through [employer] or call your pharmacy and they will forward the refill request to us. Please allow 3 business days for your refill to be completed.          Additional Information About Your Visit        MyChart Information     [employer] gives you secure access to your electronic health record. If you see a primary care provider, you can also send messages to your care team and make appointments. If you have questions, please call your primary care clinic.  If you do not have a primary care provider, please call [phone] and they will assist you.        Care EveryWhere ID     This is your Care EveryWhere ID. This could be used by other organizations to access your [facility] medical records  [id number]        Your Vitals Were     Pulse Respirations Last Period             63 16 07/01/2002          Blood Pressure from Last 3 Encounters:   05/29/18 110/59   05/12/18 116/63   05/09/18 123/83    Weight from Last 3 Encounters:   05/09/18 122.5 kg (270 lb)   05/08/18 123.1 kg (271 lb  6.2 oz)   05/07/18 123.1 kg (271 lb 6.4 oz)              Today, you had the following     No orders found for display         Today's Medication Changes          These changes are accurate as of 5/29/18 12:49 PM.  If you have any questions, ask your nurse or doctor.               These medicines have changed or have updated prescriptions.        Dose/Directions    vitamin D 2000 units tablet   This may have changed:  when to take this   Used for:  Routine general medical examination at a health care facility        Dose:  2000 Units   Take 2,000 Units by mouth daily   Quantity:  100 tablet   Refills:  3                Primary Care Provider Office Phone # Fax #    TERRA Lopez [facility] [phone]       [address] [place] [address]        Equal Access to Services     JEAN-PAUL KOVACS : Liliane Graham, waaxda luqadaha, qaybta kaalmada adeegyakamini, sonia varner . [facility] [phone].    ATENCIÓN: Si habla español, tiene a escobar disposición servicios gratuitos de asistencia lingüística. [facility] [phone].    We comply with applicable federal civil rights laws and [place] laws. We do not discriminate on the basis of race, color, national origin, age, disability, sex, sexual orientation, or gender identity.            Thank you!     Thank you for choosing [facility]  for your care. Our goal is always to provide you with excellent care. Hearing back from our patients is one way we can continue to improve our services. Please take a few minutes to complete the written survey that you may receive in the mail after your visit with us. Thank you!             Your Updated Medication List - Protect others around you: Learn how to safely use, store and throw away your medicines at www.disposemymeds.org.          This list is accurate as of 5/29/18 12:49 PM.  Always use your most recent med list.                   Brand Name Dispense Instructions for  use Diagnosis    * amLODIPine 5 MG tablet    NORVASC    135 tablet    Take 1.5 tablets (7.5 mg) by mouth daily    Essential hypertension, benign       * amLODIPine 5 MG tablet    NORVASC    90 tablet    TAKE ONE TABLET BY MOUTH ONCE DAILY    Essential hypertension, benign       aspirin 81 MG tablet     90 tablet    Take 1 tablet (81 mg) by mouth daily        buPROPion 150 MG 24 hr tablet    WELLBUTRIN XL    90 tablet    TAKE ONE TABLET BY MOUTH ONCE DAILY IN THE MORNING    Anxiety       escitalopram 20 MG tablet    LEXAPRO    60 tablet    Take 1 tablet (20 mg) by mouth daily    Anxiety       metoprolol succinate 25 MG 24 hr tablet    TOPROL-XL    90 tablet    Take 1 tablet (25 mg) by mouth daily    Essential hypertension, benign       naltrexone 50 MG tablet    DEPADE;REVIA    30 tablet    Take 1/2 tablet.  Time it one to two hours prior to worst cravings.  Then increase to one full tablet as instructed.    Morbid obesity, unspecified obesity type (H)       oxyCODONE-acetaminophen 5-325 MG per tablet    PERCOCET    20 tablet    Take 1-2 tablets by mouth every 4 hours as needed for pain (moderate to severe)    Calculus of gallbladder without cholecystitis without obstruction       vitamin D 2000 units tablet     100 tablet    Take 2,000 Units by mouth daily    Routine general medical examination at a health care facility       * Notice:  This list has 2 medication(s) that are the same as other medications prescribed for you. Read the directions carefully, and ask your doctor or other care provider to review them with you.

## 2018-05-29 NOTE — LETTER
5/29/2018         RE: Donita Lerner  [address] [address] [facility] [address]        Dear Colleague,    Thank you for referring your patient, Donita Lerner, to the [facility]. Please see a copy of my visit note below.    General Surgery Post Op    Pt returns for follow up visit s/p samantha lew on 5/11. Had muscle bleed needing suturing at umbilical incision, kept overnight in hospital    Patient has been doing well, tolerating diet. Bowels moving well. Pain controlled- didn't use any narcotics. Had part of umbilical incision open up some, no bleeding or drainage really. Has been keeping covered.    Physical exam: Vitals: /59 (BP Location: Right arm, Patient Position: Chair, Cuff Size: Adult Regular)  Pulse 63  Resp 16  LMP 07/01/2002  BMI= There is no height or weight on file to calculate BMI.    Exam:  Constitutional: healthy, alert and no distress  Cardiovascular: negative, PMI normal. No lifts, heaves, or thrills. RRR. No murmurs, clicks gallops or rub  Respiratory: negative, Percussion normal. Good diaphragmatic excursion. Lungs clear  Gastrointestinal: Abdomen soft, non-tender. BS normal. No masses, organomegaly  Umbilical wound with small area of skin edges pulled apart and some fibrous material between. No drainage. Some redness along edge does not appear infected. Other wounds OK    Path:  FINAL DIAGNOSIS:   Gallbladder, laparoscopic cholecystectomy:   - Cholelithiasis.     Assessment:     ICD-10-CM    1. S/P laparoscopic cholecystectomy Z90.49      Plan: Doing well. Will continue low fat diet. Advised to just keep eye on wound, call if concerns. Follow up as needed    Erick Rivera MD        Again, thank you for allowing me to participate in the care of your patient.        Sincerely,        Erick Rivera MD

## 2018-05-29 NOTE — PROGRESS NOTES
General Surgery Post Op    Pt returns for follow up visit s/p samantha lew on 5/11. Had muscle bleed needing suturing at umbilical incision, kept overnight in hospital    Patient has been doing well, tolerating diet. Bowels moving well. Pain controlled- didn't use any narcotics. Had part of umbilical incision open up some, no bleeding or drainage really. Has been keeping covered.    Physical exam: Vitals: /59 (BP Location: Right arm, Patient Position: Chair, Cuff Size: Adult Regular)  Pulse 63  Resp 16  LMP 07/01/2002  BMI= There is no height or weight on file to calculate BMI.    Exam:  Constitutional: healthy, alert and no distress  Cardiovascular: negative, PMI normal. No lifts, heaves, or thrills. RRR. No murmurs, clicks gallops or rub  Respiratory: negative, Percussion normal. Good diaphragmatic excursion. Lungs clear  Gastrointestinal: Abdomen soft, non-tender. BS normal. No masses, organomegaly  Umbilical wound with small area of skin edges pulled apart and some fibrous material between. No drainage. Some redness along edge does not appear infected. Other wounds OK    Path:  FINAL DIAGNOSIS:   Gallbladder, laparoscopic cholecystectomy:   - Cholelithiasis.     Assessment:     ICD-10-CM    1. S/P laparoscopic cholecystectomy Z90.49      Plan: Doing well. Will continue low fat diet. Advised to just keep eye on wound, call if concerns. Follow up as needed    Erick Rivera MD

## 2018-05-29 NOTE — NURSING NOTE
"Initial /59 (BP Location: Right arm, Patient Position: Chair, Cuff Size: Adult Regular)  Pulse 63  Resp 16  LMP 07/01/2002 Estimated body mass index is 41.66 kg/(m^2) as calculated from the following:    Height as of 5/9/18: 1.715 m (5' 7.5\").    Weight as of 5/9/18: 122.5 kg (270 lb). .    jose mclaughlin LPN    "

## 2018-07-19 NOTE — TELEPHONE ENCOUNTER
Chief Complaint   Patient presents with   • Menstrual Problem      late by 8wks   • Abdominal Cramping      2wks   • Vaginal Discharge     large blood clot        SUBJECTIVE:  Miri Duenas is a 23 year old female who is here with concerns of irregular menstrual period, pelvic pain and vaginal bleeding:    Last Thursday July 12 had heavy vaginal bleeding all day, passed large blood clot, size of \"golf\" ball, several clots later in day, then stopped (bleeding only one day). Prior to that, last menstrual period May 17, lasted 4 days, usually periods last 6 days. Was on 3 month oral contraceptive which she ran out of last Sunday. Had positive pregnancy tests at home: May 28, May 30, June 14. Did have breast tenderness, nausea end of May, [place]. Pelvic cramping for 2 weeks, associated with nausea. Feels at time needs to have bowel movement, watery yellow stool last night. Appetite different, smells different, better yestereday, no fever or chills. Admits she thought she could be pregnant but was under a lot of stress in dealing with her 4 year relationship and admits she was \"in denial\". Was seen in the emergency room last week with right ear pain diagnosis otitis media this is much improved.        Patient Active Problem List   Diagnosis   • Obesity   • Oligomenorrhea   • Contraception   • Anxiety   • Encounter for surveillance of contraceptive pills           Current Outpatient Prescriptions   Medication Sig   • amoxicillin (AMOXIL) 875 MG tablet Take 1 tablet by mouth 2 times daily.   • MONO-LINYAH 0.25-35 MG-MCG per tablet TAKE ONE TABLET BY MOUTH DAILY   • ALPRAZolam (XANAX) 0.25 MG tablet Use one tab up to 3 times a day as needed for anxiety     No current facility-administered medications for this visit.           ALLERGIES:  No Known Allergies       OBJECTIVE:  Visit Vitals  BP (!) 130/100   Pulse 76   Temp 98 °F (36.7 °C)   Wt 101.2 kg   LMP 05/17/2018 (Exact Date)   SpO2 99%   BMI 37.14 kg/m²     GENERAL:  Yes, we should place a surgical consult for her.  I plan to give her a call today. Ami Augustine RNC, NP      Alert, in no acute distress.   SKIN:  Warm and dry.   ENT:  Tympanic membranes are clear bilaterally, no drainage in ear canals.  Nares are patent, mucous membranes pink, no drainage.  Pharynx is clear, no exudate, mucous membranes moist.   NECK:  Supple, trachea midline, no lymphadenopathy.   LUNGS:  Clear to auscultation.   HEART:  Regular rate and rhythm.     Miri was seen today for menstrual problem, abdominal cramping and vaginal discharge.    Diagnoses and all orders for this visit:    Vaginal bleeding  -     CBC & AUTO DIFFERENTIAL; Future  -     COMPREHENSIVE METABOLIC PANEL; Future  -     US PELVIS COMPLETE NON OB; Future    Amenorrhea  -     PREGNANCY TEST URINE  -     CBC & AUTO DIFFERENTIAL; Future  -     COMPREHENSIVE METABOLIC PANEL; Future  -     BETA HCG QUANTITATIVE PREGNANCY; Future    Pelvic cramping  -     CBC & AUTO DIFFERENTIAL; Future  -     COMPREHENSIVE METABOLIC PANEL; Future  -     URINALYSIS & REFLEX MICRO WITH CULTURE IF INDICATED; Future  -     US PELVIS COMPLETE NON OB; Future    Elevated blood pressure reading    Diarrhea, unspecified type    Urine pregnancy test negative  Will obtain labs, pelvic ultrasound  Blood pressure elevated in clinic today, will recheck at next visit  Arrange for reevaluation on Monday  Instructed to return to clinic or call if symptoms are not resolved as discussed, or sooner if worse.  All questions answered and patient is agreeable to this plan.  Refer to patient instructions.      ANAIS Melendez M.D.

## 2018-08-22 ENCOUNTER — TELEPHONE (OUTPATIENT)
Dept: FAMILY MEDICINE | Facility: CLINIC | Age: 63
End: 2018-08-22

## 2018-08-22 NOTE — TELEPHONE ENCOUNTER
Panel Management Review      Patient has the following on her problem list:     Depression / Dysthymia review    Measure:  Needs PHQ-9 score of 4 or less during index window.  Administer PHQ-9 and if score is 5 or more, send encounter to provider for next steps.    5 - 7 month window range:     PHQ-9 SCORE 10/13/2016 4/21/2017 4/18/2018   Total Score - - -   Total Score 2 3 8       If PHQ-9 recheck is 5 or more, route to provider for next steps.    Patient is due for:  None      Composite cancer screening  Chart review shows that this patient is due/due soon for the following Fecal Colorectal (FIT)  Summary:    Patient is due/failing the following:   FIT    Action needed:   Patient needs referral/order: fit    Type of outreach:    Left detailed message on patients personal machine, fit is due.    Questions for provider review:    None                                                                                                                                    Ashly Anderson MA       Chart routed to Care Team .

## 2018-08-27 ENCOUNTER — TELEPHONE (OUTPATIENT)
Dept: FAMILY MEDICINE | Facility: CLINIC | Age: 63
End: 2018-08-27

## 2018-08-27 DIAGNOSIS — F41.9 ANXIETY: ICD-10-CM

## 2018-08-27 NOTE — TELEPHONE ENCOUNTER
"Requested Prescriptions   Pending Prescriptions Disp Refills     buPROPion (WELLBUTRIN XL) 150 MG 24 hr tablet [Pharmacy Med Name: BUPROPION XL 150MG TAB] 90 tablet 0     Sig: TAKE 1 TABLET BY MOUTH ONCE DAILY IN THE MORNING    SSRIs Protocol Passed    8/27/2018  5:30 AM       Passed - Recent (12 mo) or future (30 days) visit within the authorizing provider's specialty    Patient had office visit in the last 12 months or has a visit in the next 30 days with authorizing provider or within the authorizing provider's specialty.  See \"Patient Info\" tab in inbasket, or \"Choose Columns\" in Meds & Orders section of the refill encounter.           Passed - Medication is Bupropion    If the medication is Bupropion (Wellbutrin), and the patient is taking for smoking cessation; OK to refill.         Passed - Patient is age 18 or older       Passed - No active pregnancy on record       Passed - No positive pregnancy test in last 12 months        Last Written Prescription Date:  4/18/18  Last Fill Quantity: 90,  # refills: 0   Last office visit: 5/9/2018 with prescribing provider:     Future Office Visit:      "

## 2018-08-28 ENCOUNTER — MYC MEDICAL ADVICE (OUTPATIENT)
Dept: FAMILY MEDICINE | Facility: CLINIC | Age: 63
End: 2018-08-28

## 2018-08-28 RX ORDER — BUPROPION HYDROCHLORIDE 150 MG/1
TABLET ORAL
Qty: 30 TABLET | Refills: 0 | Status: SHIPPED | OUTPATIENT
Start: 2018-08-28 | End: 2018-09-21

## 2018-08-28 NOTE — TELEPHONE ENCOUNTER
Clinic Action Needed:No  Reason for Call: Pt calling to see if her Wellbutrin XL was filled. Informed yes, for one month. Also, I gave her the information below that she would need to be seen in clinic before further refills given. Offered to transfer patient to , but refused at this time. Pt said she would go through [facility] for appt and further information.  Routed to: Yolanda Augustine care team    Keisha Aly RN, [facility]

## 2018-08-28 NOTE — TELEPHONE ENCOUNTER
PHQ-9 score:    PHQ-9 SCORE 8/28/2018   Total Score -   Total Score 5       Ami Augustine, TERRA CNP Nurse Practitioner Signed    Date of Service: 08/28/2018 4:40 PM Creation Time: 08/28/2018 4:40 PM    Addend Delete  Bookmark Copy       Okay to refill bupropion for 1 month.  I'd like patient to come in for a visit prior to further refills due to PHQ 9 score.Ami Augustine RNC, NP           Sent the one month refill.   Tried to call her to advise an appt, and got her voicemail. Left message on answering machine for patient to call back.  CSS, when she calls back, please advise her one month refill was sent in to her pharmacy and Yolanda would like to see her in the clinic within this month, thanks,   Kusum Resendiz RNC

## 2018-08-28 NOTE — TELEPHONE ENCOUNTER
PHQ-9 completed.       08/28/18 1604    Assessments    Assessment Instrument PHQ-9  by Radha Ritter RN  at 08/28/18 1604    PHQ-9 Developed by Rosi Phillips,Heidi Crain,Vineet Foreman and Colleagues,with an Educational Damian From [employer]   PHQ-9 Patient Health Questionaire: Over the last 2 weeks, how often have you been bothered by any of the following problems?     1.  Little interest or pleasure in doing things Several days     2.  Feeling down, depressed, or hopeless Several days     3.  Trouble falling or staying asleep, or sleeping too much Several days     4.  Feeling tired or having little energy Several days     5.  Poor appetite or overeating Several days  by Radha Ritter, RN  at 08/28/18 1604    6.  Feeling bad about yourself - or that you are a failure or have let yourself or your family down Not at all  by Radha Ritter RN  at 08/28/18 1604    7.  Trouble concentrating on things, such as reading the newspaper or watching television Not at all  by Radha Ritter, RN  at 08/28/18 1604    8.  Moving or speaking so slowly that other people could have noticed. Or the opposite - being so fidgety or restless that you have been moving around a lot more than usual Not at all  by Radha Ritter, RN  at 08/28/18 1604    9.  Thoughts that you would be better off dead, or of hurting yourself in some way Not at all  by Radha Ritter RN  at 08/28/18 1604    PHQ-9 Total Score 5 (calculated)  by Radha Ritter RN  at 08/28/18 1604    If you checked off any problems, how difficult have these problems made it for you to do your work, take care of things at home, or get along with other people? Somewhat difficult  by Radha Ritter RN  at 08/28/18 1604

## 2018-08-28 NOTE — TELEPHONE ENCOUNTER
Okay to refill bupropion for 1 month.  I'd like patient to come in for a visit prior to further refills due to PHQ 9 score.Ami Augustine RNC, NP

## 2018-08-28 NOTE — TELEPHONE ENCOUNTER
Jorgito Guerrero refill request to provider for review/approval because:  PHQ-9 >4 Please advise on refill. Vilma TAMAYO RN    PHQ-9 ([employer]) 4/18/2018   No Interest In Doing Things    Feeling Depressed    Trouble Sleeping    Tired / No Energy    No appetite or Over-Eating    Feeling Bad about Self    Trouble Concentrating    Moving Slow or Restless    Suicidal Thoughts    Total Score    1.  Little interest or pleasure in doing things 1   2.  Feeling down, depressed, or hopeless 1   3.  Trouble falling or staying asleep, or sleeping too much 1   4.  Feeling tired or having little energy 2   5.  Poor appetite or overeating 2   6.  Feeling bad about yourself 1   7.  Trouble concentrating 0   8.  Moving slowly or restless 0   9.  Suicidal or self-harm thoughts 0   PHQ-9 Total Score 8   Difficulty at work, home, or with people    NATACHA-7   [employer], 2002; Used with Permission) 4/18/2018   Over the last 2 weeks, how often have you been bothered by feeling nervous, anxious or on edge?    Over the last 2 weeks, how often have you been bothered by not being able to stop or control worrying?    Over the last 2 weeks, how often have you been bothered by worrying too much about different things?    Over the last 2 weeks, how often have you been bothered by trouble relaxing?    Over the last 2 weeks, how often have you been bothered by being so restless that it is hard to sit still?    Over the last 2 weeks, how often have you been bothered by becoming easily annoyed or irritable?    Over the last 2 weeks, how often have you been bothered by feeling afraid as if something awful might happen?    NATACHA-7 Total Score =     1. Feeling nervous, anxious, or on edge 1   2. Not being able to stop or control worrying 0   3. Worrying too much about different things 0   4. Trouble relaxing 1   5. Being so restless that it is hard to sit still 0   6. Becoming easily annoyed or irritable 0   7. Feeling afraid, as if something awful might happen  0   NATACHA-7 Total Score 2

## 2018-08-29 ASSESSMENT — PATIENT HEALTH QUESTIONNAIRE - PHQ9: SUM OF ALL RESPONSES TO PHQ QUESTIONS 1-9: 5

## 2018-09-06 ENCOUNTER — TELEPHONE (OUTPATIENT)
Dept: FAMILY MEDICINE | Facility: CLINIC | Age: 63
End: 2018-09-06

## 2018-09-06 NOTE — TELEPHONE ENCOUNTER
Reason for Call: Request for an order or referral:    Order or referral being requested: FIT test    Date needed: as soon as possible    Has the patient been seen by the PCP for this problem? YES    Additional comments: Pt is requesting to be sent a FIT test. She received one a while back and now she can't find it.    Phone number Patient can be reached at:  Home number on file [phone] (home)    Best Time:  any    Can we leave a detailed message on this number?      Call taken on 9/6/2018 at 8:53 AM by Christiana Enamorado

## 2018-09-06 NOTE — TELEPHONE ENCOUNTER
I have place a labeled FIT test with lab order in the mail for the patient. Patient is notified. Vilma TAMAYO RN

## 2018-09-20 PROCEDURE — 82274 ASSAY TEST FOR BLOOD FECAL: CPT | Performed by: NURSE PRACTITIONER

## 2018-09-21 ENCOUNTER — OFFICE VISIT (OUTPATIENT)
Dept: FAMILY MEDICINE | Facility: CLINIC | Age: 63
End: 2018-09-21
Payer: COMMERCIAL

## 2018-09-21 VITALS
HEART RATE: 50 BPM | RESPIRATION RATE: 12 BRPM | BODY MASS INDEX: 39.25 KG/M2 | DIASTOLIC BLOOD PRESSURE: 85 MMHG | WEIGHT: 265 LBS | OXYGEN SATURATION: 98 % | TEMPERATURE: 97.7 F | HEIGHT: 69 IN | SYSTOLIC BLOOD PRESSURE: 139 MMHG

## 2018-09-21 DIAGNOSIS — R60.0 LOCALIZED EDEMA: ICD-10-CM

## 2018-09-21 DIAGNOSIS — R19.5 LOOSE STOOLS: ICD-10-CM

## 2018-09-21 DIAGNOSIS — Z12.11 SCREEN FOR COLON CANCER: ICD-10-CM

## 2018-09-21 DIAGNOSIS — Z00.00 HEALTH CARE MAINTENANCE: ICD-10-CM

## 2018-09-21 DIAGNOSIS — F41.9 ANXIETY: Primary | ICD-10-CM

## 2018-09-21 PROCEDURE — 99214 OFFICE O/P EST MOD 30 MIN: CPT | Performed by: NURSE PRACTITIONER

## 2018-09-21 RX ORDER — BUPROPION HYDROCHLORIDE 150 MG/1
150 TABLET ORAL EVERY MORNING
Qty: 90 TABLET | Refills: 2 | Status: SHIPPED | OUTPATIENT
Start: 2018-09-21 | End: 2019-02-01 | Stop reason: DRUGHIGH

## 2018-09-21 ASSESSMENT — PAIN SCALES - GENERAL: PAINLEVEL: NO PAIN (0)

## 2018-09-21 NOTE — PROGRESS NOTES
SUBJECTIVE:   Donita Lerner is a 62 year old female who presents to clinic today for the following health issues:      Medication Followup of  Buproprion    Taking Medication as prescribed: yes    Side Effects:  None    Medication Helping Symptoms:  yes         Problem list and histories reviewed & adjusted, as indicated.  Further history obtained, clarified or corrected by provider:  Anxiety is adequately managed her current dose of bupropion.  Does have a depression component as well.  Concerned as we move into fall and winter with decreased daylight.  She does have a light box at home that she has used intermittently in the past.    Reports bowel movements since summer loose following her cholecystectomy. wonders about allergies.. Specifically wonders about lactose intolerance.  She drinks very little milk, but does eat cheeses and yogurt freely.     Has a light box that uses intermittently.      Health maintenance  Patient with history of osteoporosis.  Due for DEXA scan.  After kidney stone in 2010, she was instructed to eliminate calcium supplementation.  Has been taking vitamin D but unsure of proper dose.    Trace lower extremity edema.  She has worn compression stockings/LILA stockings following knee surgery in the past.  Mild compression felt good and she would like to get authorization to get a new pair.    Patient Active Problem List   Diagnosis     Generalized anxiety disorder     Symptomatic menopausal or female climacteric states     Sensorineural hearing loss     CNS DISORDER -gliosis on mri     S/P total knee arthroplasty     Advanced care planning/counseling discussion     Abnormal MRI of head     Essential hypertension      obesity, unspecified obesity type (HCC)     Major depressive disorder, single episode, mild (H)     S/P left unicompartmental knee replacement     Osteoporosis     Seasonal affective disorder (H)     Calculus of gallbladder without cholecystitis     S/P laparoscopic  "cholecystectomy     Past Surgical History:   Procedure Laterality Date     ARTHROPLASTY KNEE  2013    Procedure: ARTHROPLASTY KNEE;  Right Total Knee Arthroplasty  ;  Surgeon: Ganesh Sheikh MD;  Location: [facility]     ARTHROPLASTY KNEE UNICOMPARTMENT Left 2016    Procedure: ARTHROPLASTY KNEE UNICOMPARTMENT;  Surgeon: Ganesh Sheikh MD;  Location:  OR     C ANESTH, SECTION      x3     LAPAROSCOPIC CHOLECYSTECTOMY WITH CHOLANGIOGRAMS N/A 2018    Procedure: LAPAROSCOPIC CHOLECYSTECTOMY WITH CHOLANGIOGRAMS;  Laparoscopic Cholecystectomy with Intraoperative Cholangiogram;  Surgeon: Erick Rivera MD;  Location: [facility] OR       Social History   Substance Use Topics     Smoking status: Never Smoker     Smokeless tobacco: Never Used     Alcohol use No     Family History   Problem Relation Age of Onset     Cancer Mother      lymphoma     Depression Mother      Cancer Father      lung     Psychotic Disorder Daughter      borderline personality disorder     Depression Brother            Reviewed and updated as needed this visit by clinical staff  Tobacco  Allergies  Meds  Problems  Med Hx  Surg Hx  Fam Hx  Soc Hx        Reviewed and updated as needed this visit by Provider  Allergies  Meds  Problems         ROS:  Constitutional, HEENT, cardiovascular, pulmonary, gi and gu systems are negative, except as otherwise noted.    OBJECTIVE:     /85 (BP Location: Right arm, Patient Position: Chair, Cuff Size: Adult Large)  Pulse 50  Temp 97.7  F (36.5  C) (Tympanic)  Resp 12  Ht 5' 8.5\" (1.74 m)  Wt 265 lb (120.2 kg)  LMP 2002  SpO2 98%  Breastfeeding? No  BMI 39.71 kg/m2  Body mass index is 39.71 kg/(m^2).  GENERAL: healthy, alert and no distress  MS: Trace lower extremity edema to mid pretibial area.    PSYCH: mentation appears normal, affect normal/bright    Diagnostic Test Results:  none     ASSESSMENT/PLAN:     ASSESSMENT:  1. Anxiety.  Well managed.  " No change in plan of care.   2. Localized edema.  Will order Magdy stockings   3. Health care maintenance.  Follow-up DEXA scan.   4. Loose stools.  Remove milk products for 2 weeks       PLAN:  Orders Placed This Encounter     DX Hip/Pelvis/Spine     order for DME     buPROPion (WELLBUTRIN XL) 150 MG 24 hr tablet       Patient Instructions   Take Vitamin D supplement 2000 units daily  Patient agrees with plan of care as outlined. Call or return to the clinic with any worsening of symptoms or no resolution. Medication side effects reviewed.      Ami Augustine, TODD, APRN CNP  [facility]

## 2018-09-21 NOTE — MR AVS SNAPSHOT
After Visit Summary   9/21/2018    Donita Lerner    MRN: [id number]           Patient Information     Date Of Birth          1955        Visit Information        Provider Department      9/21/2018 12:40 PM Ami Augustine APRN CNP [facility]        Today's Diagnoses     Localized edema    -  1    Health care maintenance        Anxiety          Care Instructions    Take Vitamin D supplement 2000 units daily          Follow-ups after your visit        Follow-up notes from your care team     Return in about 6 months (around 3/21/2019) for Physical Exam.      Future tests that were ordered for you today     Open Future Orders        Priority Expected Expires Ordered    DX Hip/Pelvis/Spine Routine  9/21/2019 9/21/2018            Who to contact     If you have questions or need follow up information about today's clinic visit or your schedule please contact [facility] directly at [phone].  Normal or non-critical lab and imaging results will be communicated to you by MyChart, letter or phone within 4 business days after the clinic has received the results. If you do not hear from us within 7 days, please contact the clinic through [employer] or phone. If you have a critical or abnormal lab result, we will notify you by phone as soon as possible.  Submit refill requests through [employer] or call your pharmacy and they will forward the refill request to us. Please allow 3 business days for your refill to be completed.          Additional Information About Your Visit        MyChart Information     [employer] gives you secure access to your electronic health record. If you see a primary care provider, you can also send messages to your care team and make appointments. If you have questions, please call your primary care clinic.  If you do not have a primary care provider, please call [phone] and they will assist you.        Care EveryWhere ID     This is your  "Care EveryWhere ID. This could be used by other organizations to access your [facility] medical records  [id number]        Your Vitals Were     Pulse Temperature Respirations Height Last Period Pulse Oximetry    50 97.7  F (36.5  C) (Tympanic) 12 5' 8.5\" (1.74 m) 07/01/2002 98%    Breastfeeding? BMI (Body Mass Index)                No 39.71 kg/m2           Blood Pressure from Last 3 Encounters:   09/21/18 139/85   05/29/18 110/59   05/12/18 116/63    Weight from Last 3 Encounters:   09/21/18 265 lb (120.2 kg)   05/09/18 270 lb (122.5 kg)   05/08/18 271 lb 6.2 oz (123.1 kg)                 Today's Medication Changes          These changes are accurate as of 9/21/18  1:21 PM.  If you have any questions, ask your nurse or doctor.               Start taking these medicines.        Dose/Directions    order for DME   Used for:  Localized edema   Started by:  Ami Augustine APRN CNP        Equipment being ordered: LIAL compression stockings   Quantity:  1 Units   Refills:  0         These medicines have changed or have updated prescriptions.        Dose/Directions    buPROPion 150 MG 24 hr tablet   Commonly known as:  WELLBUTRIN XL   This may have changed:  See the new instructions.   Used for:  Anxiety   Changed by:  Ami Augustine APRN CNP        Dose:  150 mg   Take 1 tablet (150 mg) by mouth every morning   Quantity:  90 tablet   Refills:  2       vitamin D 2000 units tablet   This may have changed:  when to take this   Used for:  Routine general medical examination at a health care facility        Dose:  2000 Units   Take 2,000 Units by mouth daily   Quantity:  100 tablet   Refills:  3            Where to get your medicines      These medications were sent to [facility] [address] [place] [address] [address]  [address] [facility] [address]     Phone:  [phone]     buPROPion 150 MG 24 hr tablet         Some of these will need a paper prescription and others can be bought over the counter.  " Ask your nurse if you have questions.     Bring a paper prescription for each of these medications     order for DME                Primary Care Provider Office Phone # Fax #    [facility] [phone]       [address] [place] [id number]        Equal Access to Services     JEAN-PAUL KOVACS : Hadii romel mccarthy hadasho Soomaali, waaxda luqadaha, qaybta kaalmada adeegyada, waxfoster pringle hayshirley cruzshainadat covarrubias. So [facility] [phone].    ATENCIÓN: Si habla español, tiene a escobar disposición servicios gratuitos de asistencia lingüística. Llame al [phone].    We comply with applicable federal civil rights laws and [place] laws. We do not discriminate on the basis of race, color, national origin, age, disability, sex, sexual orientation, or gender identity.            Thank you!     Thank you for choosing [facility]  for your care. Our goal is always to provide you with excellent care. Hearing back from our patients is one way we can continue to improve our services. Please take a few minutes to complete the written survey that you may receive in the mail after your visit with us. Thank you!             Your Updated Medication List - Protect others around you: Learn how to safely use, store and throw away your medicines at www.disposemymeds.org.          This list is accurate as of 9/21/18  1:21 PM.  Always use your most recent med list.                   Brand Name Dispense Instructions for use Diagnosis    * amLODIPine 5 MG tablet    NORVASC    135 tablet    Take 1.5 tablets (7.5 mg) by mouth daily    Essential hypertension, benign       * amLODIPine 5 MG tablet    NORVASC    90 tablet    TAKE ONE TABLET BY MOUTH ONCE DAILY    Essential hypertension, benign       aspirin 81 MG tablet     90 tablet    Take 1 tablet (81 mg) by mouth daily        buPROPion 150 MG 24 hr tablet    WELLBUTRIN XL    90 tablet    Take 1 tablet (150 mg) by mouth every morning    Anxiety        escitalopram 20 MG tablet    LEXAPRO    180 tablet    TAKE 1 TABLET BY MOUTH ONCE DAILY    Anxiety       metoprolol succinate 25 MG 24 hr tablet    TOPROL-XL    90 tablet    Take 1 tablet (25 mg) by mouth daily    Essential hypertension, benign       naltrexone 50 MG tablet    DEPADE;REVIA    30 tablet    Take 1/2 tablet.  Time it one to two hours prior to worst cravings.  Then increase to one full tablet as instructed.    Morbid obesity, unspecified obesity type (H)       order for DME     1 Units    Equipment being ordered: LILA compression stockings    Localized edema       oxyCODONE-acetaminophen 5-325 MG per tablet    PERCOCET    20 tablet    Take 1-2 tablets by mouth every 4 hours as needed for pain (moderate to severe)    Calculus of gallbladder without cholecystitis without obstruction       vitamin D 2000 units tablet     100 tablet    Take 2,000 Units by mouth daily    Routine general medical examination at a health care facility       * Notice:  This list has 2 medication(s) that are the same as other medications prescribed for you. Read the directions carefully, and ask your doctor or other care provider to review them with you.

## 2018-09-22 ASSESSMENT — PATIENT HEALTH QUESTIONNAIRE - PHQ9: SUM OF ALL RESPONSES TO PHQ QUESTIONS 1-9: 4

## 2018-09-23 LAB — HEMOCCULT STL QL IA: NEGATIVE

## 2018-12-07 ENCOUNTER — TELEPHONE (OUTPATIENT)
Dept: FAMILY MEDICINE | Facility: CLINIC | Age: 63
End: 2018-12-07

## 2018-12-12 ENCOUNTER — OFFICE VISIT (OUTPATIENT)
Dept: FAMILY MEDICINE | Facility: CLINIC | Age: 63
End: 2018-12-12
Payer: COMMERCIAL

## 2018-12-12 VITALS
OXYGEN SATURATION: 96 % | WEIGHT: 267 LBS | SYSTOLIC BLOOD PRESSURE: 145 MMHG | DIASTOLIC BLOOD PRESSURE: 87 MMHG | HEIGHT: 69 IN | HEART RATE: 62 BPM | TEMPERATURE: 98.4 F | RESPIRATION RATE: 12 BRPM | BODY MASS INDEX: 39.55 KG/M2

## 2018-12-12 DIAGNOSIS — R60.0 LOCALIZED EDEMA: ICD-10-CM

## 2018-12-12 DIAGNOSIS — Z23 NEED FOR PROPHYLACTIC VACCINATION AND INOCULATION AGAINST INFLUENZA: ICD-10-CM

## 2018-12-12 DIAGNOSIS — F32.0 MAJOR DEPRESSIVE DISORDER, SINGLE EPISODE, MILD (H): ICD-10-CM

## 2018-12-12 DIAGNOSIS — F41.1 GENERALIZED ANXIETY DISORDER: Primary | ICD-10-CM

## 2018-12-12 PROCEDURE — 90682 RIV4 VACC RECOMBINANT DNA IM: CPT | Performed by: NURSE PRACTITIONER

## 2018-12-12 PROCEDURE — 90471 IMMUNIZATION ADMIN: CPT | Performed by: NURSE PRACTITIONER

## 2018-12-12 PROCEDURE — 99214 OFFICE O/P EST MOD 30 MIN: CPT | Mod: 25 | Performed by: NURSE PRACTITIONER

## 2018-12-12 RX ORDER — BUPROPION HYDROCHLORIDE 300 MG/1
300 TABLET ORAL EVERY MORNING
Qty: 90 TABLET | Refills: 3 | Status: SHIPPED | OUTPATIENT
Start: 2018-12-12 | End: 2019-12-10

## 2018-12-12 ASSESSMENT — ANXIETY QUESTIONNAIRES
7. FEELING AFRAID AS IF SOMETHING AWFUL MIGHT HAPPEN: MORE THAN HALF THE DAYS
IF YOU CHECKED OFF ANY PROBLEMS ON THIS QUESTIONNAIRE, HOW DIFFICULT HAVE THESE PROBLEMS MADE IT FOR YOU TO DO YOUR WORK, TAKE CARE OF THINGS AT HOME, OR GET ALONG WITH OTHER PEOPLE: VERY DIFFICULT
5. BEING SO RESTLESS THAT IT IS HARD TO SIT STILL: NOT AT ALL
2. NOT BEING ABLE TO STOP OR CONTROL WORRYING: MORE THAN HALF THE DAYS
GAD7 TOTAL SCORE: 13
6. BECOMING EASILY ANNOYED OR IRRITABLE: SEVERAL DAYS
1. FEELING NERVOUS, ANXIOUS, OR ON EDGE: NEARLY EVERY DAY
3. WORRYING TOO MUCH ABOUT DIFFERENT THINGS: MORE THAN HALF THE DAYS

## 2018-12-12 ASSESSMENT — MIFFLIN-ST. JEOR: SCORE: 1827.54

## 2018-12-12 ASSESSMENT — PAIN SCALES - GENERAL: PAINLEVEL: NO PAIN (0)

## 2018-12-12 ASSESSMENT — PATIENT HEALTH QUESTIONNAIRE - PHQ9
5. POOR APPETITE OR OVEREATING: NEARLY EVERY DAY
SUM OF ALL RESPONSES TO PHQ QUESTIONS 1-9: 20

## 2018-12-12 NOTE — PROGRESS NOTES

## 2018-12-12 NOTE — PROGRESS NOTES
SUBJECTIVE:   Donita Lerner is a 62 year old female who presents to clinic today for the following health issues:      Depression Followup    Status since last visit: Worsened family issues    See PHQ-9 for current symptoms.  Other associated symptoms: None    Complicating factors:   Significant life event:  Yes-  Family issues with adult children   Current substance abuse:  None  Anxiety or Panic symptoms:  Yes    PHQ 8/28/2018 9/21/2018 12/12/2018   PHQ-9 Total Score 5 4 20   Q9: Suicide Ideation Not at all Not at all Several days   F/U: Thoughts of suicide or self-harm - - No   F/U: Safety concerns - - No     In the past two weeks have you had thoughts of suicide or self-harm?  No.    Do you have concerns about your personal safety or the safety of others?   No  PHQ-9  English  PHQ-9   Any Language  Suicide Assessment Five-step Evaluation and Treatment (SAFE-T)    Amount of exercise or physical activity: 2-3 days/week for an average of 30-45 minutes    Problems taking medications regularly: No    Medication side effects: none    Diet: regular (no restrictions)    Has had increased stress and anxiety over the past few months.  She and her  are dealing with family issues with their daughters.  She has had thoughts of suicide without plan or intent.  States she could never do that to her family.  Now seeing therapist which has been helpful        Medication Followup of LILA compression stockings    Taking Medication as prescribed: yes    Side Effects:  None    Medication Helping Symptoms:  yes   Has been wearing LILA stockings to manage localized edema.  Needs replacement stockings.        Problem list and histories reviewed & adjusted, as indicated.  Additional history: as documented    Patient Active Problem List   Diagnosis     Generalized anxiety disorder     Symptomatic menopausal or female climacteric states     Sensorineural hearing loss     CNS DISORDER -gliosis on mri     S/P total knee arthroplasty      Advanced care planning/counseling discussion     Abnormal MRI of head     Essential hypertension      obesity, unspecified obesity type (HCC)     Major depressive disorder, single episode, mild (H)     S/P left unicompartmental knee replacement     Osteoporosis     Seasonal affective disorder (H)     Calculus of gallbladder without cholecystitis     S/P laparoscopic cholecystectomy     Past Surgical History:   Procedure Laterality Date     ARTHROPLASTY KNEE  2013    Procedure: ARTHROPLASTY KNEE;  Right Total Knee Arthroplasty  ;  Surgeon: Ganesh Sheikh MD;  Location: [facility] OR     ARTHROPLASTY KNEE UNICOMPARTMENT Left 2016    Procedure: ARTHROPLASTY KNEE UNICOMPARTMENT;  Surgeon: Ganesh Sheikh MD;  Location: [facility] OR     C ANESTH, SECTION      x3     LAPAROSCOPIC CHOLECYSTECTOMY WITH CHOLANGIOGRAMS N/A 2018    Procedure: LAPAROSCOPIC CHOLECYSTECTOMY WITH CHOLANGIOGRAMS;  Laparoscopic Cholecystectomy with Intraoperative Cholangiogram;  Surgeon: Erick Rivera MD;  Location: [facility] OR       Social History     Tobacco Use     Smoking status: Never Smoker     Smokeless tobacco: Never Used   Substance Use Topics     Alcohol use: No     Family History   Problem Relation Age of Onset     Cancer Mother         lymphoma     Depression Mother      Cancer Father         lung     Psychotic Disorder Daughter         borderline personality disorder     Depression Brother          Current Outpatient Medications   Medication Sig Dispense Refill     amLODIPine (NORVASC) 5 MG tablet TAKE ONE TABLET BY MOUTH ONCE DAILY 90 tablet 1     amLODIPine (NORVASC) 5 MG tablet Take 1.5 tablets (7.5 mg) by mouth daily 135 tablet 3     aspirin 81 MG tablet Take 1 tablet (81 mg) by mouth daily 90 tablet 3     buPROPion (WELLBUTRIN XL) 150 MG 24 hr tablet Take 1 tablet (150 mg) by mouth every morning 90 tablet 2     buPROPion (WELLBUTRIN XL) 300 MG 24 hr tablet Take 1 tablet (300 mg) by mouth every  "morning 90 tablet 3     Cholecalciferol (VITAMIN D) 2000 UNITS tablet Take 2,000 Units by mouth daily (Patient taking differently: Take 2,000 Units by mouth 2 times daily ) 100 tablet 3     escitalopram (LEXAPRO) 20 MG tablet TAKE 1 TABLET BY MOUTH ONCE DAILY 180 tablet 1     metoprolol succinate (TOPROL-XL) 25 MG 24 hr tablet Take 1 tablet (25 mg) by mouth daily 90 tablet 3     naltrexone (DEPADE;REVIA) 50 MG tablet Take 1/2 tablet.  Time it one to two hours prior to worst cravings.  Then increase to one full tablet as instructed. 30 tablet 3     order for DME Equipment being ordered: LILA compression stockings 1 Units 0     oxyCODONE-acetaminophen (PERCOCET) 5-325 MG per tablet Take 1-2 tablets by mouth every 4 hours as needed for pain (moderate to severe) 20 tablet 0       Reviewed and updated as needed this visit by clinical staff  Tobacco  Allergies  Meds  Problems  Med Hx  Surg Hx  Fam Hx  Soc Hx        Reviewed and updated as needed this visit by Provider  Tobacco  Allergies  Meds  Problems  Med Hx  Surg Hx  Fam Hx         ROS:  Constitutional, HEENT, cardiovascular, pulmonary, gi and gu systems are negative, except as otherwise noted.    OBJECTIVE:     /87 (BP Location: Right arm, Patient Position: Chair, Cuff Size: Adult Large)   Pulse 62   Temp 98.4  F (36.9  C) (Tympanic)   Resp 12   Ht 1.74 m (5' 8.5\")   Wt 121.1 kg (267 lb)   LMP 07/01/2002   SpO2 96%   Breastfeeding? No   BMI 40.01 kg/m    Body mass index is 40.01 kg/m .  GENERAL: healthy, alert and no distress  NECK: no adenopathy, no asymmetry, masses, or scars and thyroid normal to palpation  RESP: lungs clear to auscultation - no rales, rhonchi or wheezes  CV: regular rates and rhythm, normal S1 S2, no S3 or S4, no murmur, click or rub, peripheral pulses strong and trace nonpitting lower extremity edema  MS: no gross musculoskeletal defects noted, no edema  PSYCH: mentation appears normal, tearful and anxious at times " during visit    Diagnostic Test Results:  Results for orders placed or performed in visit on 09/21/18   Fecal colorectal cancer screen FIT - Future (S+30)   Result Value Ref Range    Occult Blood Scn FIT Negative NEG^Negative       ASSESSMENT/PLAN:     ASSESSMENT:  1. Generalized anxiety disorder.  Patient would benefit from increase in bupropion.  Increase dose to 300 mg daily.   2. Major depressive disorder, single episode, mild (H).  Increase bupropion to 300 mg daily   3. Localized edema.  No need for diuretic.  Prescription for new LILA stockings provided.   4. Need for prophylactic vaccination and inoculation against influenza        PLAN:  Orders Placed This Encounter     FLU VACCINE, (RIV4) RECOMBINANT HA  , IM (FluBlok, egg free) [81978]- >18 YRS ([facility] recommended  50-64 YRS)     Vaccine Administration, Initial [13830]     order for DME     buPROPion (WELLBUTRIN XL) 300 MG 24 hr tablet       Patient Instructions   Increase your Wellbutrin to 300 mg (2 of the 150 mg tablets). When your supply is gone, I have sent a prescription for 300 mg tablets.     Patient agrees with plan of care as outlined. Call or return to the clinic with any worsening of symptoms or no resolution. Medication side effects reviewed.    Ami Augustine, NP, APRN [facility]

## 2018-12-12 NOTE — PATIENT INSTRUCTIONS
Increase your Wellbutrin to 300 mg (2 of the 150 mg tablets). When your supply is gone, I have sent a prescription for 300 mg tablets.

## 2018-12-13 ASSESSMENT — ANXIETY QUESTIONNAIRES: GAD7 TOTAL SCORE: 13

## 2018-12-14 ENCOUNTER — TELEPHONE (OUTPATIENT)
Dept: FAMILY MEDICINE | Facility: CLINIC | Age: 63
End: 2018-12-14

## 2018-12-14 ENCOUNTER — MEDICAL CORRESPONDENCE (OUTPATIENT)
Dept: HEALTH INFORMATION MANAGEMENT | Facility: CLINIC | Age: 63
End: 2018-12-14

## 2018-12-14 NOTE — TELEPHONE ENCOUNTER
Form returned from provider  Home Medical Compression Stockings  Sent to be scanned  Faxed to  Home Medical at [phone].  Elena Torres  Clinic Station  Flex

## 2019-02-01 ENCOUNTER — OFFICE VISIT (OUTPATIENT)
Dept: FAMILY MEDICINE | Facility: CLINIC | Age: 64
End: 2019-02-01
Payer: COMMERCIAL

## 2019-02-01 VITALS
OXYGEN SATURATION: 98 % | RESPIRATION RATE: 24 BRPM | DIASTOLIC BLOOD PRESSURE: 72 MMHG | HEART RATE: 64 BPM | TEMPERATURE: 98.6 F | HEIGHT: 68 IN | SYSTOLIC BLOOD PRESSURE: 122 MMHG | WEIGHT: 265 LBS | BODY MASS INDEX: 40.16 KG/M2

## 2019-02-01 DIAGNOSIS — J00 ACUTE RHINITIS: Primary | ICD-10-CM

## 2019-02-01 DIAGNOSIS — J02.9 SORE THROAT: ICD-10-CM

## 2019-02-01 DIAGNOSIS — H61.23 BILATERAL IMPACTED CERUMEN: ICD-10-CM

## 2019-02-01 LAB
DEPRECATED S PYO AG THROAT QL EIA: NORMAL
SPECIMEN SOURCE: NORMAL

## 2019-02-01 PROCEDURE — 99213 OFFICE O/P EST LOW 20 MIN: CPT | Mod: 25 | Performed by: NURSE PRACTITIONER

## 2019-02-01 PROCEDURE — 87081 CULTURE SCREEN ONLY: CPT | Performed by: NURSE PRACTITIONER

## 2019-02-01 PROCEDURE — 69209 REMOVE IMPACTED EAR WAX UNI: CPT | Mod: 50 | Performed by: NURSE PRACTITIONER

## 2019-02-01 PROCEDURE — 87880 STREP A ASSAY W/OPTIC: CPT | Performed by: NURSE PRACTITIONER

## 2019-02-01 ASSESSMENT — MIFFLIN-ST. JEOR: SCORE: 1797.59

## 2019-02-01 ASSESSMENT — PAIN SCALES - GENERAL: PAINLEVEL: MILD PAIN (2)

## 2019-02-01 NOTE — PATIENT INSTRUCTIONS
Rapid strep negative - await culture and will update you if positive    This is a viral rhinitis - cold   Make sure you are getting a lot of rest and fluids  Ibuprofen and/or tylenol for discomfort or fever  Warm salt water gargles 3-4 times a day for sore throat   Start an over the counter antihistamine such as Claritin or Zyrtec   Cool mist vaporizer at night   If you can tolerate you can use a nasal saline flush such as the [place] Pot  Sleep with head of bed up at night to promote drainage     If symptoms not improving in the next week return to clinic for re-evaluation

## 2019-02-01 NOTE — NURSING NOTE
"Chief Complaint   Patient presents with     Pharyngitis       Initial LMP 07/01/2002  Estimated body mass index is 40.01 kg/m  as calculated from the following:    Height as of 12/12/18: 1.74 m (5' 8.5\").    Weight as of 12/12/18: 121.1 kg (267 lb).    Patient presents to the clinic using No DME    Health Maintenance that is potentially due pending provider review:  NONE    n/a    Is there anyone who you would like to be able to receive your results? not asked  If yes have patient fill out DARELL    "

## 2019-02-01 NOTE — PROGRESS NOTES
SUBJECTIVE:   Donita Lerner is a 63 year old female who presents to clinic today for the following health issues:      ENT Symptoms             Symptoms: cc Present Absent Comment   Fever/Chills   x    Fatigue   x    Muscle Aches   x    Eye Irritation   x    Sneezing  x     Nasal Gomez/Drg  x     Sinus Pressure/Pain   x    Loss of smell   x    Dental pain   x    Sore Throat  x     Swollen Glands   x    Ear Pain/Fullness  x  Feels plugged   Cough   x    Wheeze   x    Chest Pain   x    Shortness of breath   x    Rash   x    Other   x      Symptom duration:  4 days   Symptom severity:  8/10 this AM   Treatments tried:   mg this AM helped with the pain   Contacts:  none           Problem list and histories reviewed & adjusted, as indicated.  Additional history: as documented    Patient Active Problem List   Diagnosis     Generalized anxiety disorder     Symptomatic menopausal or female climacteric states     Sensorineural hearing loss     CNS DISORDER -gliosis on mri     S/P total knee arthroplasty     Advanced care planning/counseling discussion     Abnormal MRI of head     Essential hypertension      obesity, unspecified obesity type (HCC)     Major depressive disorder, single episode, mild (H)     S/P left unicompartmental knee replacement     Osteoporosis     Seasonal affective disorder (H)     Calculus of gallbladder without cholecystitis     S/P laparoscopic cholecystectomy     Past Surgical History:   Procedure Laterality Date     ARTHROPLASTY KNEE  2013    Procedure: ARTHROPLASTY KNEE;  Right Total Knee Arthroplasty  ;  Surgeon: Ganesh Sheikh MD;  Location: [facility]     ARTHROPLASTY KNEE UNICOMPARTMENT Left 2016    Procedure: ARTHROPLASTY KNEE UNICOMPARTMENT;  Surgeon: Ganesh Sheikh MD;  Location: [facility]     C ANESTH, SECTION      x3     LAPAROSCOPIC CHOLECYSTECTOMY WITH CHOLANGIOGRAMS N/A 2018    Procedure: LAPAROSCOPIC CHOLECYSTECTOMY WITH CHOLANGIOGRAMS;   Laparoscopic Cholecystectomy with Intraoperative Cholangiogram;  Surgeon: Erick iRvera MD;  Location: [facility]       Social History     Tobacco Use     Smoking status: Never Smoker     Smokeless tobacco: Never Used   Substance Use Topics     Alcohol use: No     Family History   Problem Relation Age of Onset     Cancer Mother         lymphoma     Depression Mother      Cancer Father         lung     Psychotic Disorder Daughter         borderline personality disorder     Depression Brother          Current Outpatient Medications   Medication Sig Dispense Refill     amLODIPine (NORVASC) 5 MG tablet Take 1.5 tablets (7.5 mg) by mouth daily 135 tablet 3     aspirin 81 MG tablet Take 1 tablet (81 mg) by mouth daily 90 tablet 3     buPROPion (WELLBUTRIN XL) 300 MG 24 hr tablet Take 1 tablet (300 mg) by mouth every morning 90 tablet 3     Cholecalciferol (VITAMIN D) 2000 UNITS tablet Take 2,000 Units by mouth daily (Patient taking differently: Take 2,000 Units by mouth 2 times daily ) 100 tablet 3     escitalopram (LEXAPRO) 20 MG tablet TAKE 1 TABLET BY MOUTH ONCE DAILY 180 tablet 1     metoprolol succinate (TOPROL-XL) 25 MG 24 hr tablet Take 1 tablet (25 mg) by mouth daily 90 tablet 3     naltrexone (DEPADE;REVIA) 50 MG tablet Take 1/2 tablet.  Time it one to two hours prior to worst cravings.  Then increase to one full tablet as instructed. 30 tablet 3     order for DME Equipment being ordered: LILA compression stockings (Patient not taking: Reported on 2/1/2019) 1 Units 0     Allergies   Allergen Reactions     Penicillins Anaphylaxis and Swelling     Lisinopril Cough     Sulfa Drugs Rash       Reviewed and updated as needed this visit by clinical staff  Tobacco  Allergies  Meds  Problems  Med Hx  Surg Hx  Fam Hx  Soc Hx        Reviewed and updated as needed this visit by Provider  Tobacco  Allergies  Meds  Problems  Med Hx  Surg Hx  Fam Hx  Soc Hx          ROS:  Constitutional, HEENT, cardiovascular,  "pulmonary, gi and gu systems are negative, except as otherwise noted.    OBJECTIVE:     /72   Pulse 64   Temp 98.6  F (37  C)   Resp 24   Ht 1.715 m (5' 7.5\")   Wt 120.2 kg (265 lb)   LMP 07/01/2002   SpO2 98%   Breastfeeding? No   BMI 40.89 kg/m    Body mass index is 40.89 kg/m .  GENERAL APPEARANCE: healthy, alert and no distress  EYES: Eyes grossly normal to inspection, PERRL and conjunctivae and sclerae normal  HENT: ear canals completely impacted with cerumen, flushed by CMA with clearing of all cerumen, ear canals normal and TM's normal, nose and mouth without ulcers or lesions and post nasal drainage noted  NECK: no adenopathy, no asymmetry, masses, or scars and thyroid normal to palpation  RESP: lungs clear to auscultation - no rales, rhonchi or wheezes  CV: regular rates and rhythm, normal S1 S2, no S3 or S4 and no murmur, click or rub  ABDOMEN: soft, nontender, without hepatosplenomegaly or masses and bowel sounds normal  MS: extremities normal- no gross deformities noted  SKIN: no suspicious lesions or rashes    Diagnostic Test Results:  Results for orders placed or performed in visit on 02/01/19 (from the past 24 hour(s))   Strep, Rapid Screen   Result Value Ref Range    Specimen Description Throat     Rapid Strep A Screen       NEGATIVE: No Group A streptococcal antigen detected by immunoassay, await culture report.       ASSESSMENT/PLAN:     1. Acute rhinitis  Patient with increased nasal congestion and drainage over the last few days with complaints of sore throat.  Patient has not had any fevers or cough.  Denies any shortness of breath, chest pain, or pressure.  Postnasal drainage noted on exam, rapid strep negative, symptoms likely viral.  Discussed supportive cares with patient starting an over-the-counter antihistamine.  Patient return to clinic in 1 week if symptoms are not improving.  Patient verbalizes understanding.    2. Sore throat  Rapid strep negative, will await culture.  " Sore throat likely secondary to postnasal drainage.  Warm salt water gargles and fluids encouraged.  - Strep, Rapid Screen    3. Bilateral impacted cerumen    - REMOVE IMPACTED CERUMEN    Patient Instructions   Rapid strep negative - await culture and will update you if positive    This is a viral rhinitis - cold   Make sure you are getting a lot of rest and fluids  Ibuprofen and/or tylenol for discomfort or fever  Warm salt water gargles 3-4 times a day for sore throat   Start an over the counter antihistamine such as Claritin or Zyrtec   Cool mist vaporizer at night   If you can tolerate you can use a nasal saline flush such as the [place] Pot  Sleep with head of bed up at night to promote drainage     If symptoms not improving in the next week return to clinic for re-evaluation            TERRA Rousseau [facility]

## 2019-02-02 LAB
BACTERIA SPEC CULT: NORMAL
SPECIMEN SOURCE: NORMAL

## 2019-04-03 ENCOUNTER — OFFICE VISIT (OUTPATIENT)
Dept: FAMILY MEDICINE | Facility: CLINIC | Age: 64
End: 2019-04-03
Payer: COMMERCIAL

## 2019-04-03 VITALS — HEIGHT: 68 IN | WEIGHT: 265.6 LBS | BODY MASS INDEX: 40.25 KG/M2

## 2019-04-03 DIAGNOSIS — Z12.4 SCREENING FOR MALIGNANT NEOPLASM OF CERVIX: ICD-10-CM

## 2019-04-03 DIAGNOSIS — I10 ESSENTIAL HYPERTENSION, BENIGN: ICD-10-CM

## 2019-04-03 DIAGNOSIS — Z00.00 ROUTINE GENERAL MEDICAL EXAMINATION AT A HEALTH CARE FACILITY: Primary | ICD-10-CM

## 2019-04-03 DIAGNOSIS — Z78.0 ASYMPTOMATIC POSTMENOPAUSAL STATUS: ICD-10-CM

## 2019-04-03 DIAGNOSIS — Z11.4 SCREENING FOR HIV (HUMAN IMMUNODEFICIENCY VIRUS): ICD-10-CM

## 2019-04-03 DIAGNOSIS — E66.01 MORBID OBESITY, UNSPECIFIED OBESITY TYPE (H): ICD-10-CM

## 2019-04-03 DIAGNOSIS — F41.9 ANXIETY: ICD-10-CM

## 2019-04-03 LAB
ALBUMIN SERPL-MCNC: 3.7 G/DL (ref 3.4–5)
ALP SERPL-CCNC: 111 U/L (ref 40–150)
ALT SERPL W P-5'-P-CCNC: 20 U/L (ref 0–50)
ANION GAP SERPL CALCULATED.3IONS-SCNC: 6 MMOL/L (ref 3–14)
AST SERPL W P-5'-P-CCNC: 12 U/L (ref 0–45)
BILIRUB SERPL-MCNC: 0.7 MG/DL (ref 0.2–1.3)
BUN SERPL-MCNC: 26 MG/DL (ref 7–30)
CALCIUM SERPL-MCNC: 8.9 MG/DL (ref 8.5–10.1)
CHLORIDE SERPL-SCNC: 108 MMOL/L (ref 94–109)
CHOLEST SERPL-MCNC: 183 MG/DL
CO2 SERPL-SCNC: 27 MMOL/L (ref 20–32)
CREAT SERPL-MCNC: 0.89 MG/DL (ref 0.52–1.04)
GFR SERPL CREATININE-BSD FRML MDRD: 69 ML/MIN/{1.73_M2}
GLUCOSE SERPL-MCNC: 93 MG/DL (ref 70–99)
HDLC SERPL-MCNC: 67 MG/DL
LDLC SERPL CALC-MCNC: 99 MG/DL
NONHDLC SERPL-MCNC: 116 MG/DL
POTASSIUM SERPL-SCNC: 4.3 MMOL/L (ref 3.4–5.3)
PROT SERPL-MCNC: 7.5 G/DL (ref 6.8–8.8)
SODIUM SERPL-SCNC: 141 MMOL/L (ref 133–144)
TRIGL SERPL-MCNC: 85 MG/DL

## 2019-04-03 PROCEDURE — 87624 HPV HI-RISK TYP POOLED RSLT: CPT | Performed by: NURSE PRACTITIONER

## 2019-04-03 PROCEDURE — 99396 PREV VISIT EST AGE 40-64: CPT | Performed by: NURSE PRACTITIONER

## 2019-04-03 PROCEDURE — 80053 COMPREHEN METABOLIC PANEL: CPT | Performed by: NURSE PRACTITIONER

## 2019-04-03 PROCEDURE — 80061 LIPID PANEL: CPT | Performed by: NURSE PRACTITIONER

## 2019-04-03 PROCEDURE — G0145 SCR C/V CYTO,THINLAYER,RESCR: HCPCS | Performed by: NURSE PRACTITIONER

## 2019-04-03 PROCEDURE — 87389 HIV-1 AG W/HIV-1&-2 AB AG IA: CPT | Performed by: NURSE PRACTITIONER

## 2019-04-03 PROCEDURE — 36415 COLL VENOUS BLD VENIPUNCTURE: CPT | Performed by: NURSE PRACTITIONER

## 2019-04-03 RX ORDER — ESCITALOPRAM OXALATE 20 MG/1
20 TABLET ORAL DAILY
Qty: 180 TABLET | Refills: 3 | Status: SHIPPED | OUTPATIENT
Start: 2019-04-03 | End: 2020-04-17

## 2019-04-03 RX ORDER — METOPROLOL SUCCINATE 25 MG/1
25 TABLET, EXTENDED RELEASE ORAL DAILY
Qty: 90 TABLET | Refills: 3 | Status: SHIPPED | OUTPATIENT
Start: 2019-04-03 | End: 2020-04-17

## 2019-04-03 RX ORDER — PHENTERMINE HYDROCHLORIDE 15 MG/1
15 CAPSULE ORAL DAILY
Qty: 30 CAPSULE | Refills: 0 | Status: SHIPPED | OUTPATIENT
Start: 2019-04-03 | End: 2019-05-02

## 2019-04-03 RX ORDER — AMLODIPINE BESYLATE 5 MG/1
7.5 TABLET ORAL DAILY
Qty: 135 TABLET | Refills: 3 | Status: SHIPPED | OUTPATIENT
Start: 2019-04-03 | End: 2020-04-17

## 2019-04-03 RX ORDER — NALTREXONE HYDROCHLORIDE 50 MG/1
TABLET, FILM COATED ORAL
Qty: 30 TABLET | Refills: 3 | Status: CANCELLED | OUTPATIENT
Start: 2019-04-03

## 2019-04-03 ASSESSMENT — ENCOUNTER SYMPTOMS
EYE PAIN: 0
PALPITATIONS: 0
CONSTIPATION: 0
HEMATURIA: 0
NAUSEA: 0
DIZZINESS: 0
HEADACHES: 0
BREAST MASS: 0
ABDOMINAL PAIN: 0
DIARRHEA: 0
SHORTNESS OF BREATH: 0
MYALGIAS: 0
CHILLS: 0
SORE THROAT: 0
WEAKNESS: 0
COUGH: 0
FEVER: 0
NERVOUS/ANXIOUS: 1
FREQUENCY: 0
DYSURIA: 0
ARTHRALGIAS: 1

## 2019-04-03 ASSESSMENT — MIFFLIN-ST. JEOR: SCORE: 1808.25

## 2019-04-03 ASSESSMENT — ANXIETY QUESTIONNAIRES
7. FEELING AFRAID AS IF SOMETHING AWFUL MIGHT HAPPEN: NOT AT ALL
3. WORRYING TOO MUCH ABOUT DIFFERENT THINGS: SEVERAL DAYS
6. BECOMING EASILY ANNOYED OR IRRITABLE: SEVERAL DAYS
GAD7 TOTAL SCORE: 6
5. BEING SO RESTLESS THAT IT IS HARD TO SIT STILL: NOT AT ALL
1. FEELING NERVOUS, ANXIOUS, OR ON EDGE: MORE THAN HALF THE DAYS
2. NOT BEING ABLE TO STOP OR CONTROL WORRYING: SEVERAL DAYS

## 2019-04-03 ASSESSMENT — PATIENT HEALTH QUESTIONNAIRE - PHQ9
SUM OF ALL RESPONSES TO PHQ QUESTIONS 1-9: 7
5. POOR APPETITE OR OVEREATING: SEVERAL DAYS

## 2019-04-03 NOTE — NURSING NOTE
"Chief Complaint   Patient presents with     Physical       Initial LMP 07/01/2002   Breastfeeding? No  Estimated body mass index is 40.89 kg/m  as calculated from the following:    Height as of 2/1/19: 1.715 m (5' 7.5\").    Weight as of 2/1/19: 120.2 kg (265 lb).    Patient presents to the clinic using No DME    Health Maintenance that is potentially due pending provider review:  Pap Smear    Possibly completing today per provider review.    Is there anyone who you would like to be able to receive your results? not asked  If yes have patient fill out DARELL    "

## 2019-04-03 NOTE — PATIENT INSTRUCTIONS
No changes in any previous medications    Start Phentermine for cravings - can take this early afternoon to help evening cravings. If you are having difficulty sleeping can split and take 7.5 in am and 7.5 in early afternoon  Watch blood pressure while on this     Work on diet and increase exercise     I do require a month follow up for this before any further prescriptions     Will get lab work today and update you on results     Schedule DEXA - bone density scan [phone]      [facility] Mammo Schedule  [facility] ~ [phone]  One Day Weekly- Alternating Days    [place] ~ [phone]  Every other Monday or Wednesday   & one Saturday morning a month    [place] ~ [phone]  Every Other Monday Afternoon    [place] ~ [phone]  Every Other Monday Morning    [place] ~ [phone]  Every Monday morning  Every Tuesday afternoon  Wed, Thurs, Friday morning & afternoon       Preventive Health Recommendations  Female Ages 50 - 64    Yearly exam: See your health care provider every year in order to  o Review health changes.   o Discuss preventive care.    o Review your medicines if your doctor has prescribed any.      Get a Pap test every three years (unless you have an abnormal result and your provider advises testing more often).    If you get Pap tests with HPV test, you only need to test every 5 years, unless you have an abnormal result.     You do not need a Pap test if your uterus was removed (hysterectomy) and you have not had cancer.    You should be tested each year for STDs (sexually transmitted diseases) if you're at risk.     Have a mammogram every 1 to 2 years.    Have a colonoscopy at age 50, or have a yearly FIT test (stool test). These exams screen for colon cancer.      Have a cholesterol test every 5 years, or more often if advised.    Have a diabetes test (fasting glucose) every three years. If you are at risk for diabetes, you should have this test more often.     If you are at  risk for osteoporosis (brittle bone disease), think about having a bone density scan (DEXA).    Shots: Get a flu shot each year. Get a tetanus shot every 10 years.    Nutrition:     Eat at least 5 servings of fruits and vegetables each day.    Eat whole-grain bread, whole-wheat pasta and brown rice instead of white grains and rice.    Get adequate Calcium and Vitamin D.     Lifestyle    Exercise at least 150 minutes a week (30 minutes a day, 5 days a week). This will help you control your weight and prevent disease.    Limit alcohol to one drink per day.    No smoking.     Wear sunscreen to prevent skin cancer.     See your dentist every six months for an exam and cleaning.    See your eye doctor every 1 to 2 years.

## 2019-04-03 NOTE — PROGRESS NOTES
SUBJECTIVE:   CC: Donita Lerner is an 63 year old woman who presents for preventive health visit.   Chief Complaint   Patient presents with     Physical     Weight Problem     Naltrexoe does not help, would like to discuss other options       Physical   Annual:     Getting at least 3 servings of Calcium per day:  Yes    Bi-annual eye exam:  Yes    Dental care twice a year:  NO    Sleep apnea or symptoms of sleep apnea:  None    Diet:  Regular (no restrictions)    Frequency of exercise:  1 day/week    Duration of exercise:  Less than 15 minutes    Taking medications regularly:  Yes    Medication side effects:  Not applicable    Additional concerns today:  No    PHQ-2 Total Score: 2      Today's PHQ-2 Score:   PHQ-2 ( 1999 Pfizer) 4/3/2019   Q1: Little interest or pleasure in doing things 1   Q2: Feeling down, depressed or hopeless 1   PHQ-2 Score 2   Q1: Little interest or pleasure in doing things Several days   Q2: Feeling down, depressed or hopeless Several days   PHQ-2 Score 2       Abuse: Current or Past(Physical, Sexual or Emotional)- No  Do you feel safe in your environment? Yes    Social History     Tobacco Use     Smoking status: Never Smoker     Smokeless tobacco: Never Used   Substance Use Topics     Alcohol use: No     Alcohol Use 4/3/2019   Prescreen: >3 drinks/day or >7 drinks/week? No   Prescreen: >3 drinks/day or >7 drinks/week? -       Reviewed orders with patient.  Reviewed health maintenance and updated orders accordingly - Yes  Labs reviewed in EPIC  BP Readings from Last 3 Encounters:   04/03/19 (P) 122/72   02/01/19 122/72   12/12/18 145/87    Wt Readings from Last 3 Encounters:   04/03/19 120.5 kg (265 lb 9.6 oz)   02/01/19 120.2 kg (265 lb)   12/12/18 121.1 kg (267 lb)                  Patient Active Problem List   Diagnosis     Generalized anxiety disorder     Symptomatic menopausal or female climacteric states     Sensorineural hearing loss     CNS DISORDER -gliosis on mri     S/P total  knee arthroplasty     Advanced care planning/counseling discussion     Abnormal MRI of head     Essential hypertension      obesity, unspecified obesity type (HCC)     Major depressive disorder, single episode, mild (H)     S/P left unicompartmental knee replacement     Osteoporosis     Seasonal affective disorder (H)     Calculus of gallbladder without cholecystitis     S/P laparoscopic cholecystectomy     Past Surgical History:   Procedure Laterality Date     ARTHROPLASTY KNEE  2013    Procedure: ARTHROPLASTY KNEE;  Right Total Knee Arthroplasty  ;  Surgeon: Ganesh Sheikh MD;  Location: [facility]     ARTHROPLASTY KNEE UNICOMPARTMENT Left 2016    Procedure: ARTHROPLASTY KNEE UNICOMPARTMENT;  Surgeon: Ganesh Sheikh MD;  Location: [facility] OR     C ANESTH, SECTION      x3     LAPAROSCOPIC CHOLECYSTECTOMY WITH CHOLANGIOGRAMS N/A 2018    Procedure: LAPAROSCOPIC CHOLECYSTECTOMY WITH CHOLANGIOGRAMS;  Laparoscopic Cholecystectomy with Intraoperative Cholangiogram;  Surgeon: Erick Rivera MD;  Location: [facility]       Social History     Tobacco Use     Smoking status: Never Smoker     Smokeless tobacco: Never Used   Substance Use Topics     Alcohol use: No     Family History   Problem Relation Age of Onset     Cancer Mother         lymphoma     Depression Mother      Cancer Father         lung     Psychotic Disorder Daughter         borderline personality disorder     Depression Brother          Current Outpatient Medications   Medication Sig Dispense Refill     amLODIPine (NORVASC) 5 MG tablet Take 1.5 tablets (7.5 mg) by mouth daily 135 tablet 3     aspirin 81 MG tablet Take 1 tablet (81 mg) by mouth daily 90 tablet 3     buPROPion (WELLBUTRIN XL) 300 MG 24 hr tablet Take 1 tablet (300 mg) by mouth every morning 90 tablet 3     Cholecalciferol (VITAMIN D) 2000 UNITS tablet Take 2,000 Units by mouth daily 100 tablet 3     escitalopram (LEXAPRO) 20 MG tablet Take 1 tablet (20 mg)  by mouth daily 180 tablet 3     metoprolol succinate ER (TOPROL-XL) 25 MG 24 hr tablet Take 1 tablet (25 mg) by mouth daily 90 tablet 3     order for DME Equipment being ordered: LILA compression stockings 1 Units 0     phentermine (ADIPEX-P) 15 MG capsule Take 1 capsule (15 mg) by mouth daily 30 capsule 0     Allergies   Allergen Reactions     Penicillins Anaphylaxis and Swelling     Lisinopril Cough     Sulfa Drugs Rash       Mammogram Screening: Patient over age 50, mutual decision to screen reflected in health maintenance.    Pertinent mammograms are reviewed under the imaging tab.  History of abnormal Pap smear: NO - age 30-65 PAP every 5 years with negative HPV co-testing recommended  PAP / HPV Latest Ref Rng & Units 4/3/2019 3/28/2016 3/23/2015   PAP - NIL NIL UNSAT   HPV 16 DNA NEG - Negative -   HPV 18 DNA NEG - Negative -   OTHER HR HPV NEG - Negative -     Reviewed and updated as needed this visit by clinical staff  Tobacco  Allergies  Meds  Problems  Med Hx  Surg Hx  Fam Hx  Soc Hx          Reviewed and updated as needed this visit by Provider  Tobacco  Allergies  Meds  Problems  Med Hx  Surg Hx  Fam Hx  Soc Hx         Past Medical History:   Diagnosis Date     Arthritis      Depressive disorder, not elsewhere classified      Generalized anxiety disorder      Hypertension     No cardiologist     Meniere's disease 5/17/2005     Problem list name updated by automated process. Provider to review     Meniere's disease, unspecified      Sensorineural hearing loss, unspecified     left ear since birth complete     Unspecified episodic mood disorder     depression worsens winter SADD      Past Surgical History:   Procedure Laterality Date     ARTHROPLASTY KNEE  6/17/2013    Procedure: ARTHROPLASTY KNEE;  Right Total Knee Arthroplasty  ;  Surgeon: Ganesh Sheikh MD;  Location:  OR     ARTHROPLASTY KNEE UNICOMPARTMENT Left 6/8/2016    Procedure: ARTHROPLASTY KNEE UNICOMPARTMENT;   "Surgeon: Ganesh Sheikh MD;  Location: [facility] OR     C ANESTH, SECTION      x3     LAPAROSCOPIC CHOLECYSTECTOMY WITH CHOLANGIOGRAMS N/A 2018    Procedure: LAPAROSCOPIC CHOLECYSTECTOMY WITH CHOLANGIOGRAMS;  Laparoscopic Cholecystectomy with Intraoperative Cholangiogram;  Surgeon: Erick Rivera MD;  Location: [facility]       Review of Systems   Constitutional: Negative for chills and fever.   HENT: Negative for congestion, ear pain, hearing loss and sore throat.    Eyes: Negative for pain and visual disturbance.   Respiratory: Negative for cough and shortness of breath.    Cardiovascular: Negative for chest pain, palpitations and peripheral edema.   Gastrointestinal: Negative for abdominal pain, constipation, diarrhea and nausea.   Breasts:  Negative for tenderness, breast mass and discharge.   Genitourinary: Negative for dysuria, frequency, genital sores, hematuria, pelvic pain, urgency, vaginal bleeding and vaginal discharge.   Musculoskeletal: Positive for arthralgias. Negative for myalgias.   Skin: Negative for rash.   Neurological: Negative for dizziness, weakness and headaches.   Psychiatric/Behavioral: Negative for mood changes. The patient is nervous/anxious.         OBJECTIVE:   BP (P) 122/72   Pulse (P) 75   Temp (P) 97.5  F (36.4  C)   Resp (P) 18   Ht 1.727 m (5' 8\")   Wt 120.5 kg (265 lb 9.6 oz)   LMP 2002   SpO2 (P) 99%   Breastfeeding? No   BMI 40.38 kg/m    Physical Exam  GENERAL APPEARANCE: healthy, alert and no distress  EYES: Eyes grossly normal to inspection, PERRL and conjunctivae and sclerae normal  HENT: ear canals and TM's normal, nose and mouth without ulcers or lesions, oropharynx clear and oral mucous membranes moist  NECK: no adenopathy, no asymmetry, masses, or scars and thyroid normal to palpation  RESP: lungs clear to auscultation - no rales, rhonchi or wheezes  BREAST: normal without masses, tenderness or nipple discharge and no palpable axillary " masses or adenopathy  CV: regular rate and rhythm, normal S1 S2, no S3 or S4, no murmur, click or rub, no peripheral edema and peripheral pulses strong  ABDOMEN: soft, nontender, no hepatosplenomegaly, no masses and bowel sounds normal   (female): normal female external genitalia, normal urethral meatus, vaginal mucosal atrophy noted, normal cervix, adnexae, and uterus without masses or abnormal discharge  MS: no musculoskeletal defects are noted and gait is age appropriate without ataxia  SKIN: no suspicious lesions or rashes  NEURO: Normal strength and tone, sensory exam grossly normal, mentation intact and speech normal  PSYCH: mentation appears normal and affect normal/bright    Diagnostic Test Results:  Pending     ASSESSMENT/PLAN:   1. Routine general medical examination at a health care facility  Generally healthy 63-year-old female for routine exam today.  - Lipid Profile (Chol, Trig, HDL, LDL calc)    2. Screening for malignant neoplasm of cervix    - Pap imaged thin layer screen with HPV - recommended age 30 - 65 years (select HPV order below)  - HPV High Risk Types DNA Cervical    3. Morbid obesity, unspecified obesity type (H)  Patient with history of obesity, difficulty with losing weight.   Has seen nutritionist in the distant past.  Has difficulty controlling appetite, winter has been difficult to increase activity.  Is planning on starting to increase activity with improved weather.  Discussed alternate options for weight loss including exercise, diet, improved sleep and pharmacotherapy.  Patient is interested in trialing something to help with appetite control.  Will trial Adipex at low dose, patient to monitor blood pressure.  Advised needs monthly office visits for continued use.  Patient agreeable.  Advised this is not a long-term option for her.  - phentermine (ADIPEX-P) 15 MG capsule; Take 1 capsule (15 mg) by mouth daily  Dispense: 30 capsule; Refill: 0  - Lipid Profile (Chol, Trig, HDL, LDL  "calc)    4. Essential hypertension, benign  Blood pressure stable, continue same medication without any changes.  Will recheck labs.  - amLODIPine (NORVASC) 5 MG tablet; Take 1.5 tablets (7.5 mg) by mouth daily  Dispense: 135 tablet; Refill: 3  - metoprolol succinate ER (TOPROL-XL) 25 MG 24 hr tablet; Take 1 tablet (25 mg) by mouth daily  Dispense: 90 tablet; Refill: 3  - Comprehensive metabolic panel (BMP + Alb, Alk Phos, ALT, AST, Total. Bili, TP)    5. Anxiety  Stable, no change.  - escitalopram (LEXAPRO) 20 MG tablet; Take 1 tablet (20 mg) by mouth daily  Dispense: 180 tablet; Refill: 3    6. Asymptomatic postmenopausal status  Advised should schedule a bone scan.  Patient agreeable and will schedule.  - DEXA HIP/PELVIS/SPINE - Future; Future    7. Screening for HIV (human immunodeficiency virus)    - HIV Screening    COUNSELING:  Reviewed preventive health counseling, as reflected in patient instructions    BP Readings from Last 1 Encounters:   04/03/19 (P) 122/72     Estimated body mass index is 40.38 kg/m  as calculated from the following:    Height as of this encounter: 1.727 m (5' 8\").    Weight as of this encounter: 120.5 kg (265 lb 9.6 oz).      Weight management plan: Discussed healthy diet and exercise guidelines trial of Phentermine for appetite control     reports that she has never smoked. She has never used smokeless tobacco.      Counseling Resources:  ATP IV Guidelines  Pooled Cohorts Equation Calculator  Breast Cancer Risk Calculator  FRAX Risk Assessment  ICSI Preventive Guidelines  Dietary Guidelines for Americans, 2010  USDA's MyPlate  ASA Prophylaxis  Lung CA Screening    TERRA Rousseau [facility]"

## 2019-04-04 LAB — HIV 1+2 AB+HIV1 P24 AG SERPL QL IA: NONREACTIVE

## 2019-04-04 ASSESSMENT — ANXIETY QUESTIONNAIRES: GAD7 TOTAL SCORE: 6

## 2019-04-05 LAB
COPATH REPORT: NORMAL
PAP: NORMAL

## 2019-04-09 LAB
FINAL DIAGNOSIS: NORMAL
HPV HR 12 DNA CVX QL NAA+PROBE: NEGATIVE
HPV16 DNA SPEC QL NAA+PROBE: NEGATIVE
HPV18 DNA SPEC QL NAA+PROBE: NEGATIVE
SPECIMEN DESCRIPTION: NORMAL
SPECIMEN SOURCE CVX/VAG CYTO: NORMAL

## 2019-05-02 ENCOUNTER — ALLIED HEALTH/NURSE VISIT (OUTPATIENT)
Dept: FAMILY MEDICINE | Facility: CLINIC | Age: 64
End: 2019-05-02
Payer: COMMERCIAL

## 2019-05-02 VITALS
WEIGHT: 256 LBS | SYSTOLIC BLOOD PRESSURE: 122 MMHG | RESPIRATION RATE: 20 BRPM | DIASTOLIC BLOOD PRESSURE: 82 MMHG | BODY MASS INDEX: 38.92 KG/M2 | HEART RATE: 72 BPM

## 2019-05-02 DIAGNOSIS — E66.01 MORBID OBESITY, UNSPECIFIED OBESITY TYPE (H): Primary | ICD-10-CM

## 2019-05-02 DIAGNOSIS — E66.01 MORBID OBESITY, UNSPECIFIED OBESITY TYPE (H): ICD-10-CM

## 2019-05-02 PROCEDURE — 99207 ZZC NO CHARGE NURSE ONLY: CPT | Performed by: FAMILY MEDICINE

## 2019-05-02 RX ORDER — PHENTERMINE HYDROCHLORIDE 15 MG/1
15 CAPSULE ORAL DAILY
Qty: 30 CAPSULE | Refills: 0 | Status: SHIPPED | OUTPATIENT
Start: 2019-05-02 | End: 2019-06-05

## 2019-05-02 NOTE — PROGRESS NOTES
Pt here for weight and bp check for phentermine refill. Refill request sent to pcp. Lennie Paiz RN

## 2019-05-02 NOTE — TELEPHONE ENCOUNTER
Pt here for BP check and weight check. Pt needing refills of phentermine. Also wanting to know how often she should come in to check her weight and bp? Lennie Paiz RN    BP Readings from Last 3 Encounters:   05/02/19 122/82   04/03/19 (P) 122/72   02/01/19 122/72     Wt Readings from Last 2 Encounters:   05/02/19 116.1 kg (256 lb)   04/03/19 120.5 kg (265 lb 9.6 oz)

## 2019-05-06 NOTE — TELEPHONE ENCOUNTER
Blood pressure's look great, no change here. However for her phentermine, she needs to be seen by provider for further refills on a monthly basis for 3 months as we discussed in office. Please advise patient.   Can close encounter then.    Thanks  TERRA Robin CNP

## 2019-05-06 NOTE — TELEPHONE ENCOUNTER
LM on identifiable VM informing Rx refilled and as advised per Annika.  To contact clinic nurse if any questions/ concerns.  JERRICA Cope RN

## 2019-05-07 ENCOUNTER — MYC MEDICAL ADVICE (OUTPATIENT)
Dept: FAMILY MEDICINE | Facility: CLINIC | Age: 64
End: 2019-05-07

## 2019-05-07 ENCOUNTER — TELEPHONE (OUTPATIENT)
Dept: FAMILY MEDICINE | Facility: CLINIC | Age: 64
End: 2019-05-07

## 2019-05-07 ASSESSMENT — PATIENT HEALTH QUESTIONNAIRE - PHQ9
SUM OF ALL RESPONSES TO PHQ QUESTIONS 1-9: 5
SUM OF ALL RESPONSES TO PHQ QUESTIONS 1-9: 5

## 2019-05-07 NOTE — TELEPHONE ENCOUNTER
Panel Management Review      Patient has the following on her problem list:     Depression / Dysthymia review    Measure:  Needs PHQ-9 score of 4 or less during index window.  Administer PHQ-9 and if score is 5 or more, send encounter to provider for next steps.    5 - 7 month window range: 4/13-8/11    PHQ-9 SCORE 9/21/2018 12/12/2018 4/3/2019   PHQ-9 Total Score - - -   PHQ-9 Total Score 4 20 7       If PHQ-9 recheck is 5 or more, route to provider for next steps.    Patient is due for:  PHQ9    Hypertension   Last three blood pressure readings:  BP Readings from Last 3 Encounters:   05/02/19 122/82   04/03/19 (P) 122/72   02/01/19 122/72     Blood pressure: Passed    HTN Guidelines:  Less than 140/90      Composite cancer screening  Chart review shows that this patient is due/due soon for the following None  Summary:    Patient is due/failing the following:   PHQ9    Action needed:   Patient needs to do PHQ9.    Type of outreach:    Sent [email or web address] message.    Questions for provider review:    None                                                                                                                                    Kathie CURTIS [facility]       Chart routed to Care Team .

## 2019-05-08 ASSESSMENT — PATIENT HEALTH QUESTIONNAIRE - PHQ9: SUM OF ALL RESPONSES TO PHQ QUESTIONS 1-9: 5

## 2019-05-08 NOTE — TELEPHONE ENCOUNTER
PHQ-9 SCORE 12/12/2018 4/3/2019 5/7/2019   PHQ-9 Total Score - - -   PHQ-9 Total Score MyChart - - 5 (Mild depression)   PHQ-9 Total Score 20 7 5     NATACHA-7 SCORE 4/18/2018 12/12/2018 4/3/2019   Total Score - - -   Total Score 2 13 6

## 2019-06-05 ENCOUNTER — OFFICE VISIT (OUTPATIENT)
Dept: FAMILY MEDICINE | Facility: CLINIC | Age: 64
End: 2019-06-05
Payer: COMMERCIAL

## 2019-06-05 VITALS
DIASTOLIC BLOOD PRESSURE: 62 MMHG | OXYGEN SATURATION: 99 % | HEART RATE: 56 BPM | BODY MASS INDEX: 37.89 KG/M2 | SYSTOLIC BLOOD PRESSURE: 122 MMHG | TEMPERATURE: 95.6 F | WEIGHT: 249.2 LBS

## 2019-06-05 DIAGNOSIS — E66.01 MORBID OBESITY, UNSPECIFIED OBESITY TYPE (H): ICD-10-CM

## 2019-06-05 PROCEDURE — 99213 OFFICE O/P EST LOW 20 MIN: CPT | Performed by: NURSE PRACTITIONER

## 2019-06-05 RX ORDER — PHENTERMINE HYDROCHLORIDE 15 MG/1
15 CAPSULE ORAL DAILY
Qty: 30 CAPSULE | Refills: 0 | Status: SHIPPED | OUTPATIENT
Start: 2019-06-05 | End: 2019-06-13

## 2019-06-05 NOTE — PATIENT INSTRUCTIONS
Doing very well with weight loss     Continue working on good diet and increasing exercise     Continue same dose of Phentermine     See me back in 1 month in office - then will determine follow up after that

## 2019-06-05 NOTE — PROGRESS NOTES
SUBJECTIVE   Donita Lerner is a  female who presents to clinic today for the following health issue(s):       Medication Followup of Phentermine for weight    Taking Medication as prescribed: yes    Side Effects:  None    Medication Helping Symptoms:  Yes    Patient has been walking and eating more healthy too    Patient does not check her BP at home     Started medication 4/3/2019; weight down 16 lbs  Does help appetite control   Is getting in more exercise     Blood pressure's stable     PCP   Annika Whitmore, APRN -[phone]    Health Maintenance        Health Maintenance Due   Topic Date Due     ZOSTER IMMUNIZATION (1 of 2) 12/19/2005     DEXA  11/19/2016     ADVANCED DIRECTIVE PLANNING  06/20/2018       HPI        Patient Active Problem List   Diagnosis     Generalized anxiety disorder     Symptomatic menopausal or female climacteric states     Sensorineural hearing loss     CNS DISORDER -gliosis on mri     S/P total knee arthroplasty     Advanced care planning/counseling discussion     Abnormal MRI of head     Essential hypertension      obesity, unspecified obesity type (HCC)     Major depressive disorder, single episode, mild (H)     S/P left unicompartmental knee replacement     Osteoporosis     Seasonal affective disorder (H)     Calculus of gallbladder without cholecystitis     S/P laparoscopic cholecystectomy     Current Outpatient Medications   Medication     phentermine (ADIPEX-P) 15 MG capsule     amLODIPine (NORVASC) 5 MG tablet     aspirin 81 MG tablet     buPROPion (WELLBUTRIN XL) 300 MG 24 hr tablet     Cholecalciferol (VITAMIN D) 2000 UNITS tablet     escitalopram (LEXAPRO) 20 MG tablet     metoprolol succinate ER (TOPROL-XL) 25 MG 24 hr tablet     order for DME     No current facility-administered medications for this visit.        Reviewed and updated as needed this visit by Provider:  Tobacco  Allergies  Meds  Med Hx  Surg Hx  Fam Hx  Soc Hx     ROS:  Constitutional, HEENT,  "cardiovascular, pulmonary, gi and gu systems are negative, except as otherwise noted.    PHYSICAL EXAM   /62   Pulse 56   Temp 95.6  F (35.3  C)   Wt 113 kg (249 lb 3.2 oz)   LMP 07/01/2002   SpO2 99%   BMI 37.89 kg/m    Body mass index is 37.89 kg/m .  GENERAL APPEARANCE: healthy, alert and no distress  NECK: no adenopathy, no asymmetry, masses, or scars and thyroid normal to palpation  RESP: lungs clear to auscultation - no rales, rhonchi or wheezes  CV: regular rates and rhythm, normal S1 S2, no S3 or S4 and no murmur, click or rub  ABDOMEN: soft, nontender, without hepatosplenomegaly or masses and bowel sounds normal  MS: extremities normal- no gross deformities noted  SKIN: no suspicious lesions or rashes  NEURO: Normal strength and tone, mentation intact and speech normal  PSYCH: mentation appears normal and affect normal/bright      Diagnostic Test Results:  none     ASSESSMENT & PLAN   Assessment & Plan     1. Morbid obesity, unspecified obesity type (H)  Patient doing very well on phentermine.  Down 16 pounds since initial start.  Denies any side effects.  Blood pressure stable.  Patient is working on diet and exercise as well.  Hoping to wean down at next office visit.  Advised patient likely approximately 3 more months of treatment.  Will need to be seen in office in 1 month and will advise further follow-up at that time.  Patient is agreeable to plan of care.  - phentermine (ADIPEX-P) 15 MG capsule; Take 1 capsule (15 mg) by mouth daily  Dispense: 30 capsule; Refill: 0     BMI:   Estimated body mass index is 37.89 kg/m  as calculated from the following:    Height as of 4/3/19: 1.727 m (5' 8\").    Weight as of this encounter: 113 kg (249 lb 3.2 oz).   Weight management plan: Patient currently on Phentermine helping weight loss        Patient Instructions   Doing very well with weight loss     Continue working on good diet and increasing exercise     Continue same dose of Phentermine     See me " back in 1 month in office - then will determine follow up after that       Return in about 1 month (around 7/5/2019) for Recheck.    Risks, benefits, side effects and rationale for treatment plan fully discussed with the patient and understanding expressed.    TERRA Robin CNP   [facility]

## 2019-06-07 DIAGNOSIS — E66.01 MORBID OBESITY, UNSPECIFIED OBESITY TYPE (H): ICD-10-CM

## 2019-06-07 RX ORDER — PHENTERMINE HYDROCHLORIDE 15 MG/1
CAPSULE ORAL
Qty: 30 CAPSULE | Refills: 0 | OUTPATIENT
Start: 2019-06-07

## 2019-06-07 NOTE — TELEPHONE ENCOUNTER
Requested Prescriptions   Pending Prescriptions Disp Refills     phentermine (ADIPEX-P) 15 MG capsule [Pharmacy Med Name: PHENTERMINE 15MG    CAP] 30 capsule 0     Sig: TAKE 1 CAPSULE BY MOUTH ONCE DAILY       There is no refill protocol information for this order      phentermine (ADIPEX-P) 15 MG capsule  Last Written Prescription Date:  06/05/2019  Last Fill Quantity: 30 capsule,  # refills: 0   Last office visit: 6/5/2019 with prescribing provider:  IDA Whitmore   Future Office Visit:      Josephine ROJAS ([place]) (M)

## 2019-06-07 NOTE — TELEPHONE ENCOUNTER
Per 06-05-19 OV-     1. Morbid obesity, unspecified obesity type (H)  Patient doing very well on phentermine.  Down 16 pounds since initial start.  Denies any side effects.  Blood pressure stable.  Patient is working on diet and exercise as well.  Hoping to wean down at next office visit.  Advised patient likely approximately 3 more months of treatment.  Will need to be seen in office in 1 month and will advise further follow-up at that time.  Patient is agreeable to plan of care.  - phentermine (ADIPEX-P) 15 MG capsule; Take 1 capsule (15 mg) by mouth daily  Dispense: 30 capsule; Refill: 0    Pharm informed of above refill.  JERRICA Cope RN

## 2019-06-10 DIAGNOSIS — E66.01 MORBID OBESITY, UNSPECIFIED OBESITY TYPE (H): ICD-10-CM

## 2019-06-10 NOTE — TELEPHONE ENCOUNTER
Requested Prescriptions   Pending Prescriptions Disp Refills     phentermine (ADIPEX-P) 15 MG capsule [Pharmacy Med Name: PHENTERMINE 15MG    CAP] 30 capsule      Sig: TAKE 1 CAPSULE BY MOUTH ONCE DAILY       There is no refill protocol information for this order        Last Written Prescription Date:  6/5/19  Last Fill Quantity: 30,  # refills: 0   Last office visit: 6/5/2019 with prescribing provider:     Future Office Visit:

## 2019-06-11 RX ORDER — PHENTERMINE HYDROCHLORIDE 15 MG/1
CAPSULE ORAL
Qty: 30 CAPSULE | OUTPATIENT
Start: 2019-06-11

## 2019-06-13 DIAGNOSIS — E66.01 MORBID OBESITY, UNSPECIFIED OBESITY TYPE (H): ICD-10-CM

## 2019-06-14 RX ORDER — PHENTERMINE HYDROCHLORIDE 15 MG/1
CAPSULE ORAL
Qty: 30 CAPSULE | Refills: 0 | Status: SHIPPED | OUTPATIENT
Start: 2019-06-14 | End: 2019-07-17

## 2019-06-14 NOTE — TELEPHONE ENCOUNTER
phentermine (ADIPEX-P) 15 MG capsule 30 capsule 0 6/5/2019  No   Sig - Route: Take 1 capsule (15 mg) by mouth daily - Oral   Class: Local Print     Pt states she did not get hard copy script at 06-05-19 OV. Checked sent faxes- unable to locate.  Please advise further refill.  JERRICA Cope RN

## 2019-06-14 NOTE — TELEPHONE ENCOUNTER
Pt called stating she did not get a paper script at office visit.

## 2019-07-17 ENCOUNTER — OFFICE VISIT (OUTPATIENT)
Dept: FAMILY MEDICINE | Facility: CLINIC | Age: 64
End: 2019-07-17
Payer: COMMERCIAL

## 2019-07-17 VITALS
WEIGHT: 243 LBS | HEART RATE: 60 BPM | SYSTOLIC BLOOD PRESSURE: 120 MMHG | OXYGEN SATURATION: 99 % | TEMPERATURE: 98 F | DIASTOLIC BLOOD PRESSURE: 70 MMHG | BODY MASS INDEX: 36.95 KG/M2 | RESPIRATION RATE: 18 BRPM

## 2019-07-17 DIAGNOSIS — E66.01 MORBID OBESITY, UNSPECIFIED OBESITY TYPE (H): ICD-10-CM

## 2019-07-17 PROCEDURE — 99213 OFFICE O/P EST LOW 20 MIN: CPT | Performed by: NURSE PRACTITIONER

## 2019-07-17 RX ORDER — PHENTERMINE HYDROCHLORIDE 15 MG/1
15 CAPSULE ORAL EVERY MORNING
Qty: 30 CAPSULE | Refills: 0 | Status: SHIPPED | OUTPATIENT
Start: 2019-09-17 | End: 2020-04-17

## 2019-07-17 RX ORDER — PHENTERMINE HYDROCHLORIDE 15 MG/1
CAPSULE ORAL
Qty: 30 CAPSULE | Refills: 0 | Status: SHIPPED | OUTPATIENT
Start: 2019-07-17 | End: 2020-04-17

## 2019-07-17 RX ORDER — PHENTERMINE HYDROCHLORIDE 15 MG/1
15 CAPSULE ORAL EVERY MORNING
Qty: 30 CAPSULE | Refills: 0 | Status: SHIPPED | OUTPATIENT
Start: 2019-08-17 | End: 2020-04-17

## 2019-07-17 ASSESSMENT — PAIN SCALES - GENERAL: PAINLEVEL: NO PAIN (0)

## 2019-07-17 NOTE — NURSING NOTE
"Chief Complaint   Patient presents with     Weight Check     Hypertension       Initial LMP 07/01/2002   Breastfeeding? No  Estimated body mass index is 37.89 kg/m  as calculated from the following:    Height as of 4/3/19: 1.727 m (5' 8\").    Weight as of 6/5/19: 113 kg (249 lb 3.2 oz).    Patient presents to the clinic using No DME    Health Maintenance that is potentially due pending provider review:  NONE    n/a    Is there anyone who you would like to be able to receive your results? not asked  If yes have patient fill out DARELL    "

## 2019-07-17 NOTE — PATIENT INSTRUCTIONS
No changes to medications     Continue daily 15 mg daily     Continue to work on diet and increase daily activity     Recheck in office in 3 months

## 2019-07-17 NOTE — PROGRESS NOTES
SUBJECTIVE   Donita Lerner is a  female who presents to clinic today for the following health issue(s):       Hypertension Follow-up      Do you check your blood pressure regularly outside of the clinic? No     Are you following a low salt diet? Yes    Are your blood pressures ever more than 140 on the top number (systolic) OR more   than 90 on the bottom number (diastolic), for example 140/90? No    Amount of exercise or physical activity: 4-5 days/week for an average of 30-45 minutes    Problems taking medications regularly: No    Medication side effects: none    Diet: low salt and low fat/cholesterol      Medication Followup of phentermine    Taking Medication as prescribed: yes    Side Effects:  None    Medication Helping Symptoms:  yes     Wt Readings from Last 4 Encounters:   07/17/19 110.2 kg (243 lb)   06/05/19 113 kg (249 lb 3.2 oz)   05/02/19 116.1 kg (256 lb)   04/03/19 120.5 kg (265 lb 9.6 oz)       Down 22 lbs since starting phentermine   Definitely notices the curbed appetite   Working outside in the gardens this year  Has a new DVD workout   Eating healthier as well     PCP   Annika Whitmore, APRN -[phone]    Health Maintenance        Health Maintenance Due   Topic Date Due     ZOSTER IMMUNIZATION (1 of 2) 12/19/2005     DEXA  11/19/2016     ADVANCE CARE PLANNING  06/20/2018       HPI        Patient Active Problem List   Diagnosis     Generalized anxiety disorder     Symptomatic menopausal or female climacteric states     Sensorineural hearing loss     CNS DISORDER -gliosis on mri     S/P total knee arthroplasty     Advanced care planning/counseling discussion     Abnormal MRI of head     Essential hypertension      obesity, unspecified obesity type (HCC)     Major depressive disorder, single episode, mild (H)     S/P left unicompartmental knee replacement     Osteoporosis     Seasonal affective disorder (H)     Calculus of gallbladder without cholecystitis     S/P laparoscopic  cholecystectomy     Current Outpatient Medications   Medication     amLODIPine (NORVASC) 5 MG tablet     aspirin 81 MG tablet     buPROPion (WELLBUTRIN XL) 300 MG 24 hr tablet     Cholecalciferol (VITAMIN D) 2000 UNITS tablet     escitalopram (LEXAPRO) 20 MG tablet     metoprolol succinate ER (TOPROL-XL) 25 MG 24 hr tablet     phentermine (ADIPEX-P) 15 MG capsule     [START ON 9/17/2019] phentermine (ADIPEX-P) 15 MG capsule     [START ON 8/17/2019] phentermine (ADIPEX-P) 15 MG capsule     order for DME     No current facility-administered medications for this visit.        Reviewed and updated as needed this visit by Provider:  Tobacco  Allergies  Meds  Med Hx  Surg Hx  Fam Hx  Soc Hx     ROS:  Constitutional, HEENT, cardiovascular, pulmonary, gi and gu systems are negative, except as otherwise noted.    PHYSICAL EXAM   /70   Pulse 60   Temp 98  F (36.7  C)   Resp 18   Wt 110.2 kg (243 lb)   LMP 07/01/2002   SpO2 99%   Breastfeeding? No   BMI 36.95 kg/m    Body mass index is 36.95 kg/m .  GENERAL APPEARANCE: healthy, alert and no distress  RESP: lungs clear to auscultation - no rales, rhonchi or wheezes  CV: regular rates and rhythm, normal S1 S2, no S3 or S4 and no murmur, click or rub  ABDOMEN: soft, nontender, without hepatosplenomegaly or masses and bowel sounds normal  MS: extremities normal- no gross deformities noted  SKIN: no suspicious lesions or rashes  PSYCH: mentation appears normal and affect normal/bright      Diagnostic Test Results:  none     ASSESSMENT & PLAN   Assessment & Plan     1. Morbid obesity, unspecified obesity type (H)  Stable, tolerating Phentermine without side effects. Down 22 lbs since initiation. Patient working on exercise regimen, is working out in the garden more. Is also working on healthier diet options. Discussed continuing for 3 more months max. During this time patient should have a exercise and healthy diet regimen in place. Patient should return to  "clinic at that time.   - phentermine (ADIPEX-P) 15 MG capsule; TAKE 1 CAPSULE BY MOUTH ONCE DAILY  Dispense: 30 capsule; Refill: 0  - phentermine (ADIPEX-P) 15 MG capsule; Take 1 capsule (15 mg) by mouth every morning  Dispense: 30 capsule; Refill: 0  - phentermine (ADIPEX-P) 15 MG capsule; Take 1 capsule (15 mg) by mouth every morning  Dispense: 30 capsule; Refill: 0     BMI:   Estimated body mass index is 36.95 kg/m  as calculated from the following:    Height as of 4/3/19: 1.727 m (5' 8\").    Weight as of this encounter: 110.2 kg (243 lb).   Weight management plan: Patient currently on Phentermine and working on weight loss        Patient Instructions   No changes to medications     Continue daily 15 mg daily     Continue to work on diet and increase daily activity     Recheck in office in 3 months       Return in about 3 months (around 10/17/2019) for Recheck.    Risks, benefits, side effects and rationale for treatment plan fully discussed with the patient and understanding expressed.    TERRA Robin [facility]      "

## 2019-07-31 ENCOUNTER — TELEPHONE (OUTPATIENT)
Dept: FAMILY MEDICINE | Facility: CLINIC | Age: 64
End: 2019-07-31

## 2019-07-31 ENCOUNTER — MYC MEDICAL ADVICE (OUTPATIENT)
Dept: FAMILY MEDICINE | Facility: CLINIC | Age: 64
End: 2019-07-31

## 2019-07-31 NOTE — TELEPHONE ENCOUNTER
Panel Management Review      Patient has the following on her problem list:     Depression / Dysthymia review    Measure:  Needs PHQ-9 score of 4 or less during index window.  Administer PHQ-9 and if score is 5 or more, send encounter to provider for next steps.    5 - 7 month window range: 4/13/2019-8/11/2019    PHQ-9 SCORE 12/12/2018 4/3/2019 5/7/2019   PHQ-9 Total Score - - -   PHQ-9 Total Score MyChart - - 5 (Mild depression)   PHQ-9 Total Score 20 7 5       If PHQ-9 recheck is 5 or more, route to provider for next steps.    Patient is due for:  PHQ9    Hypertension   Last three blood pressure readings:  BP Readings from Last 3 Encounters:   07/17/19 120/70   06/05/19 122/62   05/02/19 122/82     Blood pressure: Passed    HTN Guidelines:  Less than 140/90      Composite cancer screening  Chart review shows that this patient is due/due soon for the following None  Summary:    Patient is due/failing the following:   PHQ9    Action needed:   Patient needs to do PHQ9.    Type of outreach:    Sent [employer] message.    Questions for provider review:    None                                                                                                                                    Kathie CURTIS [facility]       Chart routed to Care Team .

## 2019-08-01 ASSESSMENT — PATIENT HEALTH QUESTIONNAIRE - PHQ9
10. IF YOU CHECKED OFF ANY PROBLEMS, HOW DIFFICULT HAVE THESE PROBLEMS MADE IT FOR YOU TO DO YOUR WORK, TAKE CARE OF THINGS AT HOME, OR GET ALONG WITH OTHER PEOPLE: SOMEWHAT DIFFICULT
SUM OF ALL RESPONSES TO PHQ QUESTIONS 1-9: 4
SUM OF ALL RESPONSES TO PHQ QUESTIONS 1-9: 4

## 2019-08-02 ASSESSMENT — PATIENT HEALTH QUESTIONNAIRE - PHQ9: SUM OF ALL RESPONSES TO PHQ QUESTIONS 1-9: 4

## 2019-11-04 ENCOUNTER — HEALTH MAINTENANCE LETTER (OUTPATIENT)
Age: 64
End: 2019-11-04

## 2019-12-10 DIAGNOSIS — F41.1 GENERALIZED ANXIETY DISORDER: ICD-10-CM

## 2019-12-10 DIAGNOSIS — F32.0 MAJOR DEPRESSIVE DISORDER, SINGLE EPISODE, MILD (H): ICD-10-CM

## 2019-12-10 RX ORDER — BUPROPION HYDROCHLORIDE 300 MG/1
300 TABLET ORAL EVERY MORNING
Qty: 90 TABLET | Refills: 0 | Status: SHIPPED | OUTPATIENT
Start: 2019-12-10 | End: 2020-03-09

## 2019-12-10 NOTE — TELEPHONE ENCOUNTER
"Requested Prescriptions   Pending Prescriptions Disp Refills     buPROPion (WELLBUTRIN XL) 300 MG 24 hr tablet 90 tablet 3     Sig: Take 1 tablet (300 mg) by mouth every morning       SSRIs Protocol Passed - 12/10/2019 10:48 AM        Passed - PHQ-9 score less than 5 in past 6 months     Please review last PHQ-9 score.           Passed - Medication is Bupropion     If the medication is Bupropion (Wellbutrin), and the patient is taking for smoking cessation; OK to refill.          Passed - Medication is active on med list        Passed - Patient is age 18 or older        Passed - No active pregnancy on record        Passed - No positive pregnancy test in last 12 months        Passed - Recent (6 mo) or future (30 days) visit within the authorizing provider's specialty     Patient had office visit in the last 6 months or has a visit in the next 30 days with authorizing provider or within the authorizing provider's specialty.  See \"Patient Info\" tab in inbasket, or \"Choose Columns\" in Meds & Orders section of the refill encounter.            Last Written Prescription Date:  12/12/18  Last Fill Quantity: 90,  # refills: 3   Last office visit: 7/17/2019 with prescribing provider:     Future Office Visit:      "

## 2020-01-24 ENCOUNTER — TELEPHONE (OUTPATIENT)
Dept: FAMILY MEDICINE | Facility: CLINIC | Age: 65
End: 2020-01-24

## 2020-01-24 DIAGNOSIS — Z12.12 SCREENING FOR MALIGNANT NEOPLASM OF THE RECTUM: Primary | ICD-10-CM

## 2020-01-24 NOTE — TELEPHONE ENCOUNTER
Panel Management Review      Patient has the following on her problem list:     Depression / Dysthymia review    Measure:  Needs PHQ-9 score of 4 or less during index window.  Administer PHQ-9 and if score is 5 or more, send encounter to provider for next steps.    5 - 7 month window range: 10/13-2/10    PHQ-9 SCORE 4/3/2019 5/7/2019 8/1/2019   PHQ-9 Total Score - - -   PHQ-9 Total Score MyChart - 5 (Mild depression) 4 (Minimal depression)   PHQ-9 Total Score 7 5 4       If PHQ-9 recheck is 5 or more, route to provider for next steps.    Patient is due for:  PHQ9    Hypertension   Last three blood pressure readings:  BP Readings from Last 3 Encounters:   07/17/19 120/70   06/05/19 122/62   05/02/19 122/82     Blood pressure: Passed    HTN Guidelines:  Less than 140/90      Composite cancer screening  Chart review shows that this patient is due/due soon for the following Fecal Colorectal (FIT)  Summary:    Patient is due/failing the following:   FIT and PHQ9    Action needed:   Patient needs to do PHQ9.    Type of outreach:    Sent [employer] message.    Questions for provider review:    None                                                                                                                                    Kathie CURTIS [facility]       Chart routed to Care Team .

## 2020-02-14 ENCOUNTER — TELEPHONE (OUTPATIENT)
Dept: FAMILY MEDICINE | Facility: CLINIC | Age: 65
End: 2020-02-14

## 2020-02-14 NOTE — TELEPHONE ENCOUNTER
Danika had her knees replaced in 2013 and 2016.  Does she need an antibiotic before she goes to the dentist next week?  If yes please send to [facility] in [place].    Christine Leach  [facility]

## 2020-02-14 NOTE — TELEPHONE ENCOUNTER
Patient is not high risk and recommendations are not to treat prophylactic if not at high risk. No antibiotics needed.     Thanks,  TERRA Robin CNP

## 2020-02-14 NOTE — TELEPHONE ENCOUNTER
Hx bilat TKA. Has taken azithromycin 500mg tab one 1 hr prior to dental procedure in past- last prescribed 03-28-16 qty 10, no refills. (PCN allergy)  Has not been to dentist in awhile, has routine exam and cleaning appt next week. Asking if Annika recommends proph abx.  Please advise.  JERRICA Cope RN

## 2020-03-07 DIAGNOSIS — F32.0 MAJOR DEPRESSIVE DISORDER, SINGLE EPISODE, MILD (H): ICD-10-CM

## 2020-03-07 DIAGNOSIS — F41.1 GENERALIZED ANXIETY DISORDER: ICD-10-CM

## 2020-03-07 NOTE — TELEPHONE ENCOUNTER
"buPROPion HCl ER (XL) 300 MG Oral Tablet Extended Release 24 Hour  Last Written Prescription Date:  12/10/19  Last Fill Quantity: 90,  # refills: 0   Last office visit: 7/17/2019 with prescribing provider:  LYNNE   Future Office Visit:    Requested Prescriptions   Pending Prescriptions Disp Refills     buPROPion (WELLBUTRIN XL) 300 MG 24 hr tablet [Pharmacy Med Name: buPROPion HCl ER (XL) 300 MG Oral Tablet Extended Release 24 Hour]  0     Sig: TAKE 1 TABLET BY MOUTH EVERY MORNING       SSRIs Protocol Failed - 3/7/2020  8:53 AM        Failed - PHQ-9 score less than 5 in past 6 months     Please review last PHQ-9 score.           Failed - Recent (6 mo) or future (30 days) visit within the authorizing provider's specialty     Patient had office visit in the last 6 months or has a visit in the next 30 days with authorizing provider or within the authorizing provider's specialty.  See \"Patient Info\" tab in inbasket, or \"Choose Columns\" in Meds & Orders section of the refill encounter.            Passed - Medication is Bupropion     If the medication is Bupropion (Wellbutrin), and the patient is taking for smoking cessation; OK to refill.          Passed - Medication is active on med list        Passed - Patient is age 18 or older        Passed - No active pregnancy on record        Passed - No positive pregnancy test in last 12 months          "

## 2020-03-09 RX ORDER — BUPROPION HYDROCHLORIDE 300 MG/1
300 TABLET ORAL EVERY MORNING
Qty: 30 TABLET | Refills: 0 | Status: SHIPPED | OUTPATIENT
Start: 2020-03-09 | End: 2020-04-17

## 2020-03-09 NOTE — TELEPHONE ENCOUNTER
PHQ 4/3/2019 5/7/2019 8/1/2019   PHQ-9 Total Score 7 5 4   Q9: Thoughts of better off dead/self-harm past 2 weeks Not at all Not at all Not at all   F/U: Thoughts of suicide or self-harm - - -   F/U: Safety concerns - - -     NATACHA-7 SCORE 4/18/2018 12/12/2018 4/3/2019   Total Score - - -   Total Score 2 13 6

## 2020-04-15 DIAGNOSIS — F41.1 GENERALIZED ANXIETY DISORDER: ICD-10-CM

## 2020-04-15 DIAGNOSIS — I10 ESSENTIAL HYPERTENSION, BENIGN: ICD-10-CM

## 2020-04-15 DIAGNOSIS — F32.0 MAJOR DEPRESSIVE DISORDER, SINGLE EPISODE, MILD (H): ICD-10-CM

## 2020-04-16 NOTE — TELEPHONE ENCOUNTER
"Requested Prescriptions   Pending Prescriptions Disp Refills     buPROPion (WELLBUTRIN XL) 300 MG 24 hr tablet [Pharmacy Med Name: buPROPion HCl ER (XL) 300 MG Oral Tablet Extended Release 24 Hour] 30 tablet      Sig: TAKE 1 TABLET BY MOUTH IN THE MORNING (DO  NOT  CRUSH.  NEED  OFFICE  VISIT  PRIOR  TO  FUTURE  REFILLS)       SSRIs Protocol Failed - 4/15/2020  5:11 PM        Failed - PHQ-9 score less than 5 in past 6 months     Please review last PHQ-9 score.           Failed - Recent (6 mo) or future (30 days) visit within the authorizing provider's specialty     Patient had office visit in the last 6 months or has a visit in the next 30 days with authorizing provider or within the authorizing provider's specialty.  See \"Patient Info\" tab in inbasket, or \"Choose Columns\" in Meds & Orders section of the refill encounter.            Passed - Medication is Bupropion     If the medication is Bupropion (Wellbutrin), and the patient is taking for smoking cessation; OK to refill.          Passed - Medication is active on med list        Passed - Patient is age 18 or older        Passed - No active pregnancy on record        Passed - No positive pregnancy test in last 12 months           metoprolol succinate ER (TOPROL-XL) 25 MG 24 hr tablet [Pharmacy Med Name: Metoprolol Succinate ER 25 MG Oral Tablet Extended Release 24 Hour] 90 tablet      Sig: Take 1 tablet by mouth once daily       Beta-Blockers Protocol Passed - 4/15/2020  5:11 PM        Passed - Blood pressure under 140/90 in past 12 months     BP Readings from Last 3 Encounters:   07/17/19 120/70   06/05/19 122/62   05/02/19 122/82                 Passed - Patient is age 6 or older        Passed - Recent (12 mo) or future (30 days) visit within the authorizing provider's specialty     Patient has had an office visit with the authorizing provider or a provider within the authorizing providers department within the previous 12 mos or has a future within next 30 " "days. See \"Patient Info\" tab in inbasket, or \"Choose Columns\" in Meds & Orders section of the refill encounter.              Passed - Medication is active on med list           BUPROPION  Last Written Prescription Date:  3/9/20  Last Fill Quantity: 30,  # refills: 0   Last office visit: 7/17/2019 with prescribing provider:     Future Office Visit:      METOPROLOL  Last Written Prescription Date:  4/3/19  Last Fill Quantity: 90,  # refills: 3   Last office visit: 7/17/2019 with prescribing provider:     Future Office Visit:      "

## 2020-04-16 NOTE — TELEPHONE ENCOUNTER
LM to call care team- needs F/U anxiety/ depression- video (preferred) or phone visit.  JERRICA Cope RN

## 2020-04-17 ENCOUNTER — VIRTUAL VISIT (OUTPATIENT)
Dept: FAMILY MEDICINE | Facility: CLINIC | Age: 65
End: 2020-04-17
Payer: COMMERCIAL

## 2020-04-17 DIAGNOSIS — F41.9 ANXIETY: ICD-10-CM

## 2020-04-17 DIAGNOSIS — F41.1 GENERALIZED ANXIETY DISORDER: ICD-10-CM

## 2020-04-17 DIAGNOSIS — F32.0 MAJOR DEPRESSIVE DISORDER, SINGLE EPISODE, MILD (H): ICD-10-CM

## 2020-04-17 DIAGNOSIS — I10 ESSENTIAL HYPERTENSION, BENIGN: ICD-10-CM

## 2020-04-17 PROCEDURE — 99214 OFFICE O/P EST MOD 30 MIN: CPT | Mod: 95 | Performed by: NURSE PRACTITIONER

## 2020-04-17 RX ORDER — AMLODIPINE BESYLATE 5 MG/1
7.5 TABLET ORAL DAILY
Qty: 135 TABLET | Refills: 3 | Status: SHIPPED | OUTPATIENT
Start: 2020-04-17 | End: 2021-03-15

## 2020-04-17 RX ORDER — ESCITALOPRAM OXALATE 20 MG/1
20 TABLET ORAL DAILY
Qty: 90 TABLET | Refills: 3 | Status: SHIPPED | OUTPATIENT
Start: 2020-04-17 | End: 2021-03-15

## 2020-04-17 RX ORDER — METOPROLOL SUCCINATE 25 MG/1
TABLET, EXTENDED RELEASE ORAL
Qty: 90 TABLET | OUTPATIENT
Start: 2020-04-17

## 2020-04-17 RX ORDER — BUPROPION HYDROCHLORIDE 300 MG/1
300 TABLET ORAL EVERY MORNING
Qty: 90 TABLET | Refills: 3 | Status: SHIPPED | OUTPATIENT
Start: 2020-04-17 | End: 2021-03-15

## 2020-04-17 RX ORDER — BUPROPION HYDROCHLORIDE 300 MG/1
TABLET ORAL
Qty: 30 TABLET | OUTPATIENT
Start: 2020-04-17

## 2020-04-17 RX ORDER — METOPROLOL SUCCINATE 25 MG/1
25 TABLET, EXTENDED RELEASE ORAL DAILY
Qty: 90 TABLET | Refills: 3 | Status: SHIPPED | OUTPATIENT
Start: 2020-04-17 | End: 2021-03-15

## 2020-04-17 ASSESSMENT — ANXIETY QUESTIONNAIRES
5. BEING SO RESTLESS THAT IT IS HARD TO SIT STILL: NOT AT ALL
2. NOT BEING ABLE TO STOP OR CONTROL WORRYING: NOT AT ALL
3. WORRYING TOO MUCH ABOUT DIFFERENT THINGS: NOT AT ALL
1. FEELING NERVOUS, ANXIOUS, OR ON EDGE: NOT AT ALL
7. FEELING AFRAID AS IF SOMETHING AWFUL MIGHT HAPPEN: NOT AT ALL
6. BECOMING EASILY ANNOYED OR IRRITABLE: NOT AT ALL
GAD7 TOTAL SCORE: 0

## 2020-04-17 ASSESSMENT — PATIENT HEALTH QUESTIONNAIRE - PHQ9
5. POOR APPETITE OR OVEREATING: NOT AT ALL
SUM OF ALL RESPONSES TO PHQ QUESTIONS 1-9: 2

## 2020-04-17 NOTE — PATIENT INSTRUCTIONS
No changes in medications - refills in     Would like to see you back in office in approximately 3 months for labs and recheck

## 2020-04-17 NOTE — PROGRESS NOTES
"Donita Lerner is a 64 year old female who is being evaluated via a billable telephone visit.      The patient has been notified of following:     \"This telephone visit will be conducted via a call between you and your physician/provider. We have found that certain health care needs can be provided without the need for a physical exam.  This service lets us provide the care you need with a short phone conversation.  If a prescription is necessary we can send it directly to your pharmacy.  If lab work is needed we can place an order for that and you can then stop by our lab to have the test done at a later time.    Telephone visits are billed at different rates depending on your insurance coverage. During this emergency period, for some insurers they may be billed the same as an in-person visit.  Please reach out to your insurance provider with any questions.    If during the course of the call the physician/provider feels a telephone visit is not appropriate, you will not be charged for this service.\"    Patient has given verbal consent for Telephone visit?  Yes    How would you like to obtain your AVS? Iselat    Subjective     Donita Lerner is a 64 year old female who presents to clinic today for the following health issues:    Hypertension Follow-up      Do you check your blood pressure regularly outside of the clinic? No     Are you following a low salt diet? Yes    Are your blood pressures ever more than 140 on the top number (systolic) OR more   than 90 on the bottom number (diastolic), for example 140/90? No    Blood pressure's have always been good   No concerns       Depression and Anxiety Follow-Up    How are you doing with your depression since your last visit? Improved     How are you doing with your anxiety since your last visit?  Improved     Are you having other symptoms that might be associated with depression or anxiety? No    Have you had a significant life event? No     Do you have any concerns " with your use of alcohol or other drugs? No    Doing very well, really feels the difference when Wellbutrin went up to 300 mg     Social History     Tobacco Use     Smoking status: Never Smoker     Smokeless tobacco: Never Used   Substance Use Topics     Alcohol use: No     Drug use: No     PHQ 5/7/2019 8/1/2019 4/17/2020   PHQ-9 Total Score 5 4 2   Q9: Thoughts of better off dead/self-harm past 2 weeks Not at all Not at all Not at all   F/U: Thoughts of suicide or self-harm - - -   F/U: Safety concerns - - -     NATACHA-7 SCORE 12/12/2018 4/3/2019 4/17/2020   Total Score - - -   Total Score 13 6 0     Last PHQ-9 4/17/2020   1.  Little interest or pleasure in doing things 0   2.  Feeling down, depressed, or hopeless 0   3.  Trouble falling or staying asleep, or sleeping too much 0   4.  Feeling tired or having little energy 1   5.  Poor appetite or overeating 1   6.  Feeling bad about yourself 0   7.  Trouble concentrating 0   8.  Moving slowly or restless 0   Q9: Thoughts of better off dead/self-harm past 2 weeks 0   PHQ-9 Total Score 2   Difficulty at work, home, or with people -   In the past two weeks have you had thoughts of suicide or self harm? -   Do you have concerns about your personal safety or the safety of others? -     NATACHA-7  4/17/2020   1. Feeling nervous, anxious, or on edge 0   2. Not being able to stop or control worrying 0   3. Worrying too much about different things 0   4. Trouble relaxing 0   5. Being so restless that it is hard to sit still 0   6. Becoming easily annoyed or irritable 0   7. Feeling afraid, as if something awful might happen 0   NATACHA-7 Total Score 0   If you checked any problems, how difficult have they made it for you to do your work, take care of things at home, or get along with other people? -     Suicide Assessment Five-step Evaluation and Treatment (SAFE-T)      How many servings of fruits and vegetables do you eat daily?  2-3    On average, how many sweetened beverages do you  drink each day (Examples: soda, juice, sweet tea, etc.  Do NOT count diet or artificially sweetened beverages)?   0    How many days per week do you exercise enough to make your heart beat faster? 5    How many minutes a day do you exercise enough to make your heart beat faster? 20 - 29    How many days per week do you miss taking your medication? 0         Patient Active Problem List   Diagnosis     Generalized anxiety disorder     Symptomatic menopausal or female climacteric states     Sensorineural hearing loss     CNS DISORDER -gliosis on mri     S/P total knee arthroplasty     Advanced care planning/counseling discussion     Abnormal MRI of head     Essential hypertension      obesity, unspecified obesity type (HCC)     Major depressive disorder, single episode, mild (H)     S/P left unicompartmental knee replacement     Osteoporosis     Seasonal affective disorder (H)     Calculus of gallbladder without cholecystitis     S/P laparoscopic cholecystectomy     Past Surgical History:   Procedure Laterality Date     ARTHROPLASTY KNEE  2013    Procedure: ARTHROPLASTY KNEE;  Right Total Knee Arthroplasty  ;  Surgeon: Ganesh Sheikh MD;  Location: [facility]     ARTHROPLASTY KNEE UNICOMPARTMENT Left 2016    Procedure: ARTHROPLASTY KNEE UNICOMPARTMENT;  Surgeon: Ganesh Sheikh MD;  Location: [facility] OR     C ANESTH, SECTION      x3     LAPAROSCOPIC CHOLECYSTECTOMY WITH CHOLANGIOGRAMS N/A 2018    Procedure: LAPAROSCOPIC CHOLECYSTECTOMY WITH CHOLANGIOGRAMS;  Laparoscopic Cholecystectomy with Intraoperative Cholangiogram;  Surgeon: Erick Rivera MD;  Location: [facility]       Social History     Tobacco Use     Smoking status: Never Smoker     Smokeless tobacco: Never Used   Substance Use Topics     Alcohol use: No     Family History   Problem Relation Age of Onset     Cancer Mother         lymphoma     Depression Mother      Cancer Father         lung     Psychotic Disorder Daughter          borderline personality disorder     Depression Brother          Current Outpatient Medications   Medication Sig Dispense Refill     amLODIPine (NORVASC) 5 MG tablet Take 1.5 tablets (7.5 mg) by mouth daily 135 tablet 3     aspirin 81 MG tablet Take 1 tablet (81 mg) by mouth daily 90 tablet 3     buPROPion (WELLBUTRIN XL) 300 MG 24 hr tablet Take 1 tablet (300 mg) by mouth every morning DO NOT CRUSH. 90 tablet 3     Cholecalciferol (VITAMIN D) 2000 UNITS tablet Take 2,000 Units by mouth daily 100 tablet 3     escitalopram (LEXAPRO) 20 MG tablet Take 1 tablet (20 mg) by mouth daily 90 tablet 3     metoprolol succinate ER (TOPROL-XL) 25 MG 24 hr tablet Take 1 tablet (25 mg) by mouth daily 90 tablet 3     order for DME Equipment being ordered: LILA compression stockings 1 Units 0     Allergies   Allergen Reactions     Penicillins Anaphylaxis and Swelling     Lisinopril Cough     Sulfa Drugs Rash       Reviewed and updated as needed this visit by Provider  Tobacco  Allergies  Meds  Problems  Med Hx  Surg Hx  Fam Hx         Review of Systems   ROS COMP: Constitutional, HEENT, cardiovascular, pulmonary, gi and gu systems are negative, except as otherwise noted.       Objective   Reported vitals:  LMP 07/01/2002    healthy, alert and no distress  PSYCH: Alert and oriented times 3; coherent speech, normal   rate and volume, able to articulate logical thoughts, able   to abstract reason, no tangential thoughts, no hallucinations   or delusions  Her affect is normal and pleasant  RESP: No cough, no audible wheezing, able to talk in full sentences  Remainder of exam unable to be completed due to telephone visits    Diagnostic Test Results:  Labs reviewed in Epic  none         Assessment/Plan:  1. Essential hypertension, benign  Chronic, stable. No changes. Follow-up in 3 months for labs post COVID 19  - amLODIPine (NORVASC) 5 MG tablet; Take 1.5 tablets (7.5 mg) by mouth daily  Dispense: 135 tablet; Refill: 3  -  metoprolol succinate ER (TOPROL-XL) 25 MG 24 hr tablet; Take 1 tablet (25 mg) by mouth daily  Dispense: 90 tablet; Refill: 3    2. Generalized anxiety disorder  Chronic, stable. No changes.   - buPROPion (WELLBUTRIN XL) 300 MG 24 hr tablet; Take 1 tablet (300 mg) by mouth every morning DO NOT CRUSH.  Dispense: 90 tablet; Refill: 3    3. Major depressive disorder, single episode, mild (H)  Chronic, stable. No chagnes.   - buPROPion (WELLBUTRIN XL) 300 MG 24 hr tablet; Take 1 tablet (300 mg) by mouth every morning DO NOT CRUSH.  Dispense: 90 tablet; Refill: 3    4. Anxiety  Chronic, stable. No changes.   - escitalopram (LEXAPRO) 20 MG tablet; Take 1 tablet (20 mg) by mouth daily  Dispense: 90 tablet; Refill: 3    Return in about 3 months (around 7/17/2020).      Phone call duration:  6 minutes    TERRA Robin CNP

## 2020-04-18 ASSESSMENT — ANXIETY QUESTIONNAIRES: GAD7 TOTAL SCORE: 0

## 2020-11-16 ENCOUNTER — HEALTH MAINTENANCE LETTER (OUTPATIENT)
Age: 65
End: 2020-11-16

## 2021-02-07 ENCOUNTER — HEALTH MAINTENANCE LETTER (OUTPATIENT)
Age: 66
End: 2021-02-07

## 2021-03-03 ENCOUNTER — TELEPHONE (OUTPATIENT)
Dept: FAMILY MEDICINE | Facility: CLINIC | Age: 66
End: 2021-03-03

## 2021-03-03 NOTE — TELEPHONE ENCOUNTER
Reason for call:  Patient reporting a symptom    Symptom or request: constipation, bloating    Duration (how long have symptoms been present): x2 wks    Have you been treated for this before? No    Additional comments: uses miralax daily, laxative once a week that works    Phone Number patient can be reached at:  Home number on file [phone] (home)    Best Time:  any    Can we leave a detailed message on this number:  YES    Call taken on 3/3/2021 at 9:34 AM by Sherley Tan

## 2021-03-03 NOTE — TELEPHONE ENCOUNTER
"Call placed.   Patient has been experiencing constipation 2 week duration and reports 3 bowel movements in that time with the use of Mirilax and a \"laxitive\". She has been dieting  With Noom since January and reports her water intake is very poor. Appointment scheduled. She is advised to increase water intake to 8 glasses , increase mirilax to bid and use a suppository if not bm on 4th day. She states undersatnsding.    Breanna LEA RN  "

## 2021-03-15 ENCOUNTER — OFFICE VISIT (OUTPATIENT)
Dept: FAMILY MEDICINE | Facility: CLINIC | Age: 66
End: 2021-03-15
Payer: COMMERCIAL

## 2021-03-15 VITALS
TEMPERATURE: 97.4 F | HEIGHT: 67 IN | HEART RATE: 56 BPM | OXYGEN SATURATION: 97 % | RESPIRATION RATE: 16 BRPM | DIASTOLIC BLOOD PRESSURE: 70 MMHG | WEIGHT: 239 LBS | BODY MASS INDEX: 37.51 KG/M2 | SYSTOLIC BLOOD PRESSURE: 124 MMHG

## 2021-03-15 DIAGNOSIS — Z12.31 ENCOUNTER FOR SCREENING MAMMOGRAM FOR BREAST CANCER: ICD-10-CM

## 2021-03-15 DIAGNOSIS — Z78.0 POSTMENOPAUSAL STATUS: ICD-10-CM

## 2021-03-15 DIAGNOSIS — F32.0 MAJOR DEPRESSIVE DISORDER, SINGLE EPISODE, MILD (H): ICD-10-CM

## 2021-03-15 DIAGNOSIS — F41.1 GENERALIZED ANXIETY DISORDER: ICD-10-CM

## 2021-03-15 DIAGNOSIS — Z12.11 COLON CANCER SCREENING: ICD-10-CM

## 2021-03-15 DIAGNOSIS — F41.9 ANXIETY: ICD-10-CM

## 2021-03-15 DIAGNOSIS — E66.01 MORBID OBESITY, UNSPECIFIED OBESITY TYPE (H): ICD-10-CM

## 2021-03-15 DIAGNOSIS — Z00.00 ENCOUNTER FOR MEDICARE ANNUAL WELLNESS EXAM: Primary | ICD-10-CM

## 2021-03-15 DIAGNOSIS — I10 ESSENTIAL HYPERTENSION, BENIGN: ICD-10-CM

## 2021-03-15 LAB
ALBUMIN SERPL-MCNC: 3.7 G/DL (ref 3.4–5)
ALP SERPL-CCNC: 128 U/L (ref 40–150)
ALT SERPL W P-5'-P-CCNC: 14 U/L (ref 0–50)
ANION GAP SERPL CALCULATED.3IONS-SCNC: 3 MMOL/L (ref 3–14)
AST SERPL W P-5'-P-CCNC: 12 U/L (ref 0–45)
BILIRUB SERPL-MCNC: 0.5 MG/DL (ref 0.2–1.3)
BUN SERPL-MCNC: 21 MG/DL (ref 7–30)
CALCIUM SERPL-MCNC: 9.8 MG/DL (ref 8.5–10.1)
CHLORIDE SERPL-SCNC: 106 MMOL/L (ref 94–109)
CO2 SERPL-SCNC: 28 MMOL/L (ref 20–32)
CREAT SERPL-MCNC: 1.15 MG/DL (ref 0.52–1.04)
GFR SERPL CREATININE-BSD FRML MDRD: 50 ML/MIN/{1.73_M2}
GLUCOSE SERPL-MCNC: 89 MG/DL (ref 70–99)
POTASSIUM SERPL-SCNC: 4.4 MMOL/L (ref 3.4–5.3)
PROT SERPL-MCNC: 7.6 G/DL (ref 6.8–8.8)
SODIUM SERPL-SCNC: 137 MMOL/L (ref 133–144)

## 2021-03-15 PROCEDURE — 80053 COMPREHEN METABOLIC PANEL: CPT | Performed by: NURSE PRACTITIONER

## 2021-03-15 PROCEDURE — 36415 COLL VENOUS BLD VENIPUNCTURE: CPT | Performed by: NURSE PRACTITIONER

## 2021-03-15 PROCEDURE — G0402 INITIAL PREVENTIVE EXAM: HCPCS | Performed by: NURSE PRACTITIONER

## 2021-03-15 PROCEDURE — 99214 OFFICE O/P EST MOD 30 MIN: CPT | Mod: 25 | Performed by: NURSE PRACTITIONER

## 2021-03-15 RX ORDER — BUPROPION HYDROCHLORIDE 300 MG/1
300 TABLET ORAL EVERY MORNING
Qty: 90 TABLET | Refills: 3 | Status: SHIPPED | OUTPATIENT
Start: 2021-03-15 | End: 2021-05-12

## 2021-03-15 RX ORDER — AMLODIPINE BESYLATE 5 MG/1
7.5 TABLET ORAL DAILY
Qty: 135 TABLET | Refills: 3 | Status: SHIPPED | OUTPATIENT
Start: 2021-03-15 | End: 2021-05-12

## 2021-03-15 RX ORDER — ESCITALOPRAM OXALATE 20 MG/1
20 TABLET ORAL DAILY
Qty: 90 TABLET | Refills: 3 | Status: SHIPPED | OUTPATIENT
Start: 2021-03-15 | End: 2021-05-12

## 2021-03-15 RX ORDER — METOPROLOL SUCCINATE 25 MG/1
25 TABLET, EXTENDED RELEASE ORAL DAILY
Qty: 90 TABLET | Refills: 3 | Status: SHIPPED | OUTPATIENT
Start: 2021-03-15 | End: 2021-04-26

## 2021-03-15 ASSESSMENT — ENCOUNTER SYMPTOMS
HEMATOCHEZIA: 0
NAUSEA: 0
NERVOUS/ANXIOUS: 0
FREQUENCY: 0
BREAST MASS: 0
EYE PAIN: 0
PALPITATIONS: 0
ARTHRALGIAS: 0
JOINT SWELLING: 0
CHILLS: 0
SORE THROAT: 0
FEVER: 0
HEADACHES: 0
ABDOMINAL PAIN: 1
HEARTBURN: 0
WEAKNESS: 0
DYSURIA: 0
COUGH: 0
SHORTNESS OF BREATH: 0
PARESTHESIAS: 0
HEMATURIA: 0
DIARRHEA: 1
DIZZINESS: 0
CONSTIPATION: 1
MYALGIAS: 0

## 2021-03-15 ASSESSMENT — MIFFLIN-ST. JEOR: SCORE: 1661.73

## 2021-03-15 ASSESSMENT — ANXIETY QUESTIONNAIRES
GAD7 TOTAL SCORE: 5
5. BEING SO RESTLESS THAT IT IS HARD TO SIT STILL: NOT AT ALL
1. FEELING NERVOUS, ANXIOUS, OR ON EDGE: SEVERAL DAYS
6. BECOMING EASILY ANNOYED OR IRRITABLE: SEVERAL DAYS
3. WORRYING TOO MUCH ABOUT DIFFERENT THINGS: SEVERAL DAYS
7. FEELING AFRAID AS IF SOMETHING AWFUL MIGHT HAPPEN: NOT AT ALL
2. NOT BEING ABLE TO STOP OR CONTROL WORRYING: SEVERAL DAYS

## 2021-03-15 ASSESSMENT — PATIENT HEALTH QUESTIONNAIRE - PHQ9
5. POOR APPETITE OR OVEREATING: SEVERAL DAYS
SUM OF ALL RESPONSES TO PHQ QUESTIONS 1-9: 7

## 2021-03-15 ASSESSMENT — ACTIVITIES OF DAILY LIVING (ADL): CURRENT_FUNCTION: NO ASSISTANCE NEEDED

## 2021-03-15 NOTE — PROGRESS NOTES
"SUBJECTIVE:   Donita Lerner is a 65 year old female who presents for Preventive Visit.      Patient has been advised of split billing requirements and indicates understanding: Yes   Are you in the first 12 months of your Medicare coverage?  Yes,  Visual Acuity:  Right Eye: 20/32   Left Eye: 20/25  Both Eyes: 20/25    Healthy Habits:     In general, how would you rate your overall health?  Good    Frequency of exercise:  1 day/week    Duration of exercise:  15-30 minutes    Do you usually eat at least 4 servings of fruit and vegetables a day, include whole grains    & fiber and avoid regularly eating high fat or \"junk\" foods?  Yes    Medication side effects:  None    Ability to successfully perform activities of daily living:  No assistance needed    Home Safety:  No safety concerns identified    Hearing Impairment:  No hearing concerns    In the past 6 months, have you been bothered by leaking of urine?  No    In general, how would you rate your overall mental or emotional health?  Good      PHQ-2 Total Score: 2    Additional concerns today:  No    Bowel problems  For the last 3 weeks  Going back and forth between diarrhea and constipation  Abdominal pressure  Started suppository on Friday. Last bowel movement on Friday  Has been on the noom diet for a while, nothing new   Drinking at least 8 glasses of water a day       Do you feel safe in your environment? Yes    Have you ever done Advance Care Planning? (For example, a Health Directive, POLST, or a discussion with a medical provider or your loved ones about your wishes): No, advance care planning information given to patient to review.  Patient declined advance care planning discussion at this time.      Fall risk  Fallen 2 or more times in the past year?: No  Any fall with injury in the past year?: No    Cognitive Screening   1) Repeat 3 items (Leader, Season, Table)    2) Clock draw: NORMAL  3) 3 item recall: Recalls 3 objects  Results: 3 items recalled: " COGNITIVE IMPAIRMENT LESS LIKELY    Mini-CogTM Copyright GAEL Albarado. Licensed by the author for use in [facility]; reprinted with permission (greyson@.[facility]). All rights reserved.      Do you have sleep apnea, excessive snoring or daytime drowsiness?: no    Reviewed and updated as needed this visit by clinical staff  Tobacco  Allergies  Meds  Problems  Med Hx  Surg Hx  Fam Hx  Soc Hx          Reviewed and updated as needed this visit by Provider  Tobacco  Allergies  Meds  Problems  Med Hx  Surg Hx  Fam Hx         Social History     Tobacco Use     Smoking status: Never Smoker     Smokeless tobacco: Never Used   Substance Use Topics     Alcohol use: No       Alcohol Use 3/15/2021   Prescreen: >3 drinks/day or >7 drinks/week? No   Prescreen: >3 drinks/day or >7 drinks/week? -         Current providers sharing in care for this patient include:   Patient Care Team:  Annika Whitmore APRN CNP as PCP - General (Nurse Practitioner - Family)  Annika Whitmore APRN CNP as Assigned PCP    The following health maintenance items are reviewed in Epic and correct as of today:  Health Maintenance   Topic Date Due     ZOSTER IMMUNIZATION (1 of 2) Never done     DEXA  11/19/2016     COLORECTAL CANCER SCREENING  09/20/2019     MAMMO SCREENING  05/02/2020     INFLUENZA VACCINE (1) 09/01/2020     COVID-19 Vaccine (2 of 2 - Moderna series) 03/13/2021     Pneumococcal Vaccine: 65+ Years (1 of 1 - PPSV23) 12/19/2020     PHQ-9  09/15/2021     DTAP/TDAP/TD IMMUNIZATION (3 - Td) 10/06/2021     MEDICARE ANNUAL WELLNESS VISIT  03/15/2022     FALL RISK ASSESSMENT  03/15/2022     LIPID  04/03/2024     ADVANCE CARE PLANNING  03/15/2026     HEPATITIS C SCREENING  Completed     HIV SCREENING  Completed     DEPRESSION ACTION PLAN  Completed     Pneumococcal Vaccine: Pediatrics (0 to 5 Years) and At-Risk Patients (6 to 64 Years)  Aged Out     IPV IMMUNIZATION  Aged Out     MENINGITIS IMMUNIZATION  Aged Out     HEPATITIS B  IMMUNIZATION  Aged Out     Lab work is in process  Labs reviewed in EPIC  BP Readings from Last 3 Encounters:   03/15/21 124/70   19 120/70   19 122/62    Wt Readings from Last 3 Encounters:   03/15/21 108.4 kg (239 lb)   19 110.2 kg (243 lb)   19 113 kg (249 lb 3.2 oz)                  Patient Active Problem List   Diagnosis     Generalized anxiety disorder     Symptomatic menopausal or female climacteric states     Sensorineural hearing loss     CNS DISORDER -gliosis on mri     S/P total knee arthroplasty     Advanced care planning/counseling discussion     Abnormal MRI of head     Essential hypertension      obesity, unspecified obesity type (HCC)     Major depressive disorder, single episode, mild (H)     S/P left unicompartmental knee replacement     Osteoporosis     Seasonal affective disorder (H)     Calculus of gallbladder without cholecystitis     S/P laparoscopic cholecystectomy     Past Surgical History:   Procedure Laterality Date     ARTHROPLASTY KNEE  2013    Procedure: ARTHROPLASTY KNEE;  Right Total Knee Arthroplasty  ;  Surgeon: Ganesh Sheikh MD;  Location: [facility] OR     ARTHROPLASTY KNEE UNICOMPARTMENT Left 2016    Procedure: ARTHROPLASTY KNEE UNICOMPARTMENT;  Surgeon: Ganesh Sheikh MD;  Location:  OR     C ANESTH, SECTION      x3     LAPAROSCOPIC CHOLECYSTECTOMY WITH CHOLANGIOGRAMS N/A 2018    Procedure: LAPAROSCOPIC CHOLECYSTECTOMY WITH CHOLANGIOGRAMS;  Laparoscopic Cholecystectomy with Intraoperative Cholangiogram;  Surgeon: Erick Rivera MD;  Location: [facility]       Social History     Tobacco Use     Smoking status: Never Smoker     Smokeless tobacco: Never Used   Substance Use Topics     Alcohol use: No     Family History   Problem Relation Age of Onset     Cancer Mother         lymphoma     Depression Mother      Cancer Father         lung     Psychotic Disorder Daughter         borderline personality disorder      Depression Brother          Current Outpatient Medications   Medication Sig Dispense Refill     amLODIPine (NORVASC) 5 MG tablet Take 1.5 tablets (7.5 mg) by mouth daily 135 tablet 3     aspirin 81 MG tablet Take 1 tablet (81 mg) by mouth daily 90 tablet 3     buPROPion (WELLBUTRIN XL) 300 MG 24 hr tablet Take 1 tablet (300 mg) by mouth every morning DO NOT CRUSH. 90 tablet 3     Cholecalciferol (VITAMIN D) 2000 UNITS tablet Take 2,000 Units by mouth daily 100 tablet 3     escitalopram (LEXAPRO) 20 MG tablet Take 1 tablet (20 mg) by mouth daily 90 tablet 3     metoprolol succinate ER (TOPROL-XL) 25 MG 24 hr tablet Take 1 tablet (25 mg) by mouth daily 90 tablet 3     order for DME Equipment being ordered: LILA compression stockings (Patient not taking: Reported on 3/15/2021) 1 Units 0     Allergies   Allergen Reactions     Penicillins Anaphylaxis and Swelling     Lisinopril Cough     Sulfa Drugs Rash     Mammogram Screening: Recommended mammography every 1-2 years with patient discussion and risk factor consideration    Review of Systems   Constitutional: Negative for chills and fever.   HENT: Negative for congestion, ear pain, hearing loss and sore throat.    Eyes: Negative for pain and visual disturbance.   Respiratory: Negative for cough and shortness of breath.    Cardiovascular: Negative for chest pain, palpitations and peripheral edema.   Gastrointestinal: Positive for abdominal pain, constipation and diarrhea. Negative for heartburn, hematochezia and nausea.   Breasts:  Negative for tenderness, breast mass and discharge.   Genitourinary: Negative for dysuria, frequency, genital sores, hematuria, pelvic pain, urgency, vaginal bleeding and vaginal discharge.   Musculoskeletal: Negative for arthralgias, joint swelling and myalgias.   Skin: Negative for rash.   Neurological: Negative for dizziness, weakness, headaches and paresthesias.   Psychiatric/Behavioral: Negative for mood changes. The patient is not  "nervous/anxious.      Constitutional, HEENT, cardiovascular, pulmonary, gi and gu systems are negative, except as otherwise noted.    OBJECTIVE:   /70   Pulse 56   Temp 97.4  F (36.3  C) (Tympanic)   Resp 16   Ht 1.702 m (5' 7\")   Wt 108.4 kg (239 lb)   LMP 07/01/2002   SpO2 97%   BMI 37.43 kg/m   Estimated body mass index is 37.43 kg/m  as calculated from the following:    Height as of this encounter: 1.702 m (5' 7\").    Weight as of this encounter: 108.4 kg (239 lb).  Physical Exam  GENERAL: healthy, alert and no distress  EYES: Eyes grossly normal to inspection, PERRL and conjunctivae and sclerae normal  HENT: ear canals and TM's normal, nose and mouth without ulcers or lesions  NECK: no adenopathy, no asymmetry, masses, or scars and thyroid normal to palpation  RESP: lungs clear to auscultation - no rales, rhonchi or wheezes  BREAST: normal without masses, tenderness or nipple discharge and no palpable axillary masses or adenopathy  CV: regular rate and rhythm, normal S1 S2, no S3 or S4, no murmur, click or rub, no peripheral edema and peripheral pulses strong  ABDOMEN: soft, nontender, no hepatosplenomegaly, no masses and bowel sounds normal  MS: no gross musculoskeletal defects noted, no edema  SKIN: no suspicious lesions or rashes  NEURO: Normal strength and tone, mentation intact and speech normal  PSYCH: mentation appears normal, affect normal/bright    Diagnostic Test Results:  Labs reviewed in Epic  Pending     ASSESSMENT / PLAN:   1. Encounter for Medicare annual wellness exam      2. Morbid obesity, unspecified obesity type (H)  Chronic, improving.  Patient working on weight loss is down in weight.  Continue current diet and weight loss regimen.    3. Essential hypertension, benign   Chronic, stable.  No changes.  Check labs today, will call patient with results.  Patient to call pharmacy after April 1 to fill medications.  - amLODIPine (NORVASC) 5 MG tablet; Take 1.5 tablets (7.5 mg) by " "mouth daily  Dispense: 135 tablet; Refill: 3  - metoprolol succinate ER (TOPROL-XL) 25 MG 24 hr tablet; Take 1 tablet (25 mg) by mouth daily  Dispense: 90 tablet; Refill: 3  - Comprehensive metabolic panel (BMP + Alb, Alk Phos, ALT, AST, Total. Bili, TP)    4. Major depressive disorder, single episode, mild (H)  Chronic, stable.  No changes.  Call pharmacy after April 1 to fill medications.  - buPROPion (WELLBUTRIN XL) 300 MG 24 hr tablet; Take 1 tablet (300 mg) by mouth every morning DO NOT CRUSH.  Dispense: 90 tablet; Refill: 3    5. Generalized anxiety disorder  Chronic, stable.  No changes.  Patient to call pharmacy after April 1 to fill medications  - buPROPion (WELLBUTRIN XL) 300 MG 24 hr tablet; Take 1 tablet (300 mg) by mouth every morning DO NOT CRUSH.  Dispense: 90 tablet; Refill: 3    6. Anxiety  Chronic, stable.  No changes.  Call pharmacy to fill medications.  - escitalopram (LEXAPRO) 20 MG tablet; Take 1 tablet (20 mg) by mouth daily  Dispense: 90 tablet; Refill: 3    7. Encounter for screening mammogram for breast cancer  Screen.  Patient should be receiving a phone call to schedule, if no phone call can call , should be able to schedule mammogram and bone scan for the same day.  - *MA Screening Digital Bilateral; Future    8. Postmenopausal status  Bone scan ordered, schedule at above number if patient does not hear from .  - DX Hip/Pelvis/Spine; Future    9. Colon cancer screening  Colon cancer screening,  to call patient to set up.  - GASTROENTEROLOGY ADULT REF PROCEDURE ONLY; Future    Patient has been advised of split billing requirements and indicates understanding: Yes  COUNSELING:  Reviewed preventive health counseling, as reflected in patient instructions    Estimated body mass index is 37.43 kg/m  as calculated from the following:    Height as of this encounter: 1.702 m (5' 7\").    Weight as of this encounter: 108.4 kg (239 lb).    Weight management plan: " Discussed healthy diet and exercise guidelines and to continue her Noom    She reports that she has never smoked. She has never used smokeless tobacco.      Appropriate preventive services were discussed with this patient, including applicable screening as appropriate for cardiovascular disease, diabetes, osteopenia/osteoporosis, and glaucoma.  As appropriate for age/gender, discussed screening for colorectal cancer, prostate cancer, breast cancer, and cervical cancer. Checklist reviewing preventive services available has been given to the patient.    Reviewed patients plan of care and provided an AVS. The Basic Care Plan (routine screening as documented in Health Maintenance) for Donita meets the Care Plan requirement. This Care Plan has been established and reviewed with the Patient.    Counseling Resources:  ATP IV Guidelines  Pooled Cohorts Equation Calculator  Breast Cancer Risk Calculator  Breast Cancer: Medication to Reduce Risk  FRAX Risk Assessment  ICSI Preventive Guidelines  Dietary Guidelines for Americans, 2010  USDA's MyPlate  ASA Prophylaxis  Lung CA Screening    TERRA Rousseau CNP  [facility]    Identified Health Risks:

## 2021-03-15 NOTE — PROGRESS NOTES
"   SUBJECTIVE:   CC: Donita Lerner is an 65 year old woman who presents for preventive health visit.     {Split Bill scripting  The purpose of this visit is to discuss your medical history and prevent health problems before you are sick. You may be responsible for a co-pay, coinsurance, or deductible if your visit today includes services such as checking on a sore throat, having an x-ray or lab test, or treating and evaluating a new or existing condition :999047}  Patient has been advised of split billing requirements and indicates understanding: Yes  Healthy Habits:     In general, how would you rate your overall health?  Good    Frequency of exercise:  1 day/week    Duration of exercise:  15-30 minutes    Do you usually eat at least 4 servings of fruit and vegetables a day, include whole grains    & fiber and avoid regularly eating high fat or \"junk\" foods?  Yes    Medication side effects:  None    Ability to successfully perform activities of daily living:  No assistance needed    Home Safety:  No safety concerns identified    Hearing Impairment:  No hearing concerns    In the past 6 months, have you been bothered by leaking of urine?  No    In general, how would you rate your overall mental or emotional health?  Good      PHQ-2 Total Score: 2    Additional concerns today:  No    Ability to successfully perform activities of daily living: Yes, no assistance needed  Home safety:  none identified   Hearing impairment: deaf in left ear     .Hypertension Follow-up       Do you check your blood pressure regularly outside of the clinic? No     Are you following a low salt diet? Yes    Are your blood pressures ever more than 140 on the top number (systolic) OR more   than 90 on the bottom number (diastolic), for example 140/90? NA    Anxiety Follow-Up    How are you doing with your anxiety since your last visit? No change    Are you having other symptoms that might be associated with anxiety? No    Have you had a " "significant life event? No     Are you feeling depressed? No    Do you have any concerns with your use of alcohol or other drugs? No    Social History     Tobacco Use     Smoking status: Never Smoker     Smokeless tobacco: Never Used   Substance Use Topics     Alcohol use: No     Drug use: No     NATACHA-7 SCORE 12/12/2018 4/3/2019 4/17/2020   Total Score - - -   Total Score 13 6 0     PHQ 5/7/2019 8/1/2019 4/17/2020   PHQ-9 Total Score 5 4 2   Q9: Thoughts of better off dead/self-harm past 2 weeks Not at all Not at all Not at all   F/U: Thoughts of suicide or self-harm - - -   F/U: Safety concerns - - -     {Last PHQ9 or GAD7 Responses (Optional):928265}      Today's PHQ-2 Score:   PHQ-2 ( 1999 Pf[employer]) 3/15/2021   Q1: Little interest or pleasure in doing things 1   Q2: Feeling down, depressed or hopeless 1   PHQ-2 Score 2   Q1: Little interest or pleasure in doing things Several days   Q2: Feeling down, depressed or hopeless Several days   PHQ-2 Score 2       Abuse: Current or Past (Physical, Sexual or Emotional) - No  Do you feel safe in your environment? Yes    Have you ever done Advance Care Planning? (For example, a Health Directive, POLST, or a discussion with a medical provider or your loved ones about your wishes): No, advance care planning information given to patient to review.  Patient declined advance care planning discussion at this time.    Social History     Tobacco Use     Smoking status: Never Smoker     Smokeless tobacco: Never Used   Substance Use Topics     Alcohol use: No     If you drink alcohol do you typically have >3 drinks per day or >7 drinks per week? No    Alcohol Use 3/15/2021   Prescreen: >3 drinks/day or >7 drinks/week? No   Prescreen: >3 drinks/day or >7 drinks/week? -   No flowsheet data found.      Reviewed orders with patient.  Reviewed health maintenance and updated orders accordingly - { :116365::\"Yes\"}  {Chronicprobdata (optional):463875}    Breast CA Risk Screening:  Breast CA " "Risk Assessment (FHS-7) 3/15/2021   Do you have a family history of breast, colon, or ovarian cancer? No / Unknown     {Pull in link for FHS-7 answers if completed after opening note (Optional) :950382}  {If any of the questions to the BCRA (FHS-7) are answered yes, consider ordering referral for genetic counseling (Optional) :568618::\"click delete button to remove this line now\"}  {AMB Mammogram Decision Support (Optional) :751072}  Pertinent mammograms are reviewed under the imaging tab.    History of abnormal Pap smear: { :911384}  PAP / HPV Latest Ref Rng & Units 4/3/2019 3/28/2016 3/23/2015   PAP - NIL NIL UNSAT   HPV 16 DNA NEG:Negative Negative Negative -   HPV 18 DNA NEG:Negative Negative Negative -   OTHER HR HPV NEG:Negative Negative Negative -     Reviewed and updated as needed this visit by clinical staff                 Reviewed and updated as needed this visit by Provider                {HISTORY OPTIONS (Optional):853874}    Review of Systems   Constitutional: Negative for chills and fever.   HENT: Negative for congestion, ear pain, hearing loss and sore throat.    Eyes: Negative for pain and visual disturbance.   Respiratory: Negative for cough and shortness of breath.    Cardiovascular: Negative for chest pain, palpitations and peripheral edema.   Gastrointestinal: Positive for abdominal pain, constipation and diarrhea. Negative for heartburn, hematochezia and nausea.   Breasts:  Negative for tenderness, breast mass and discharge.   Genitourinary: Negative for dysuria, frequency, genital sores, hematuria, pelvic pain, urgency, vaginal bleeding and vaginal discharge.   Musculoskeletal: Negative for arthralgias, joint swelling and myalgias.   Skin: Negative for rash.   Neurological: Negative for dizziness, weakness, headaches and paresthesias.   Psychiatric/Behavioral: Negative for mood changes. The patient is not nervous/anxious.      {FEMALE ROS (Optional):522758}     OBJECTIVE:   LMP 07/01/2002 " "  Physical Exam  {Exam Choices (Optional):147301}    {Diagnostic Test Results (Optional):473958::\"Diagnostic Test Results:\",\"Labs reviewed in Epic\"}    ASSESSMENT/PLAN:   {Diag Picklist:183382}    Patient has been advised of split billing requirements and indicates understanding: {YES / NO:163364::\"Yes\"}  COUNSELING:  {FEMALE COUNSELING MESSAGES:693217::\"Reviewed preventive health counseling, as reflected in patient instructions\"}    Estimated body mass index is 36.95 kg/m  as calculated from the following:    Height as of 4/3/19: 1.727 m (5' 8\").    Weight as of 7/17/19: 110.2 kg (243 lb).    {Weight Management Plan (ACO) Complete if BMI is abnormal-  Ages 18-64  BMI >24.9.  Age 65+ with BMI <23 or >30 (Optional):187953}    She reports that she has never smoked. She has never used smokeless tobacco.      Counseling Resources:  ATP IV Guidelines  Pooled Cohorts Equation Calculator  Breast Cancer Risk Calculator  BRCA-Related Cancer Risk Assessment: FHS-7 Tool  FRAX Risk Assessment  ICSI Preventive Guidelines  Dietary Guidelines for Americans, 2010  USDA's MyPlate  ASA Prophylaxis  Lung CA Screening    [facility]    She is at risk for lack of exercise and has been provided with information to increase physical activity for the benefit of her well-being.  "

## 2021-03-15 NOTE — PATIENT INSTRUCTIONS
1. Encounter for Medicare annual wellness exam      2. Morbid obesity, unspecified obesity type (H)  Chronic, improving.  Patient working on weight loss is down in weight.  Continue current diet and weight loss regimen.    3. Essential hypertension, benign   Chronic, stable.  No changes.  Check labs today, will call patient with results.  Patient to call pharmacy after April 1 to fill medications.  - amLODIPine (NORVASC) 5 MG tablet; Take 1.5 tablets (7.5 mg) by mouth daily  Dispense: 135 tablet; Refill: 3  - metoprolol succinate ER (TOPROL-XL) 25 MG 24 hr tablet; Take 1 tablet (25 mg) by mouth daily  Dispense: 90 tablet; Refill: 3  - Comprehensive metabolic panel (BMP + Alb, Alk Phos, ALT, AST, Total. Bili, TP)    4. Major depressive disorder, single episode, mild (H)  Chronic, stable.  No changes.  Call pharmacy after April 1 to fill medications.  - buPROPion (WELLBUTRIN XL) 300 MG 24 hr tablet; Take 1 tablet (300 mg) by mouth every morning DO NOT CRUSH.  Dispense: 90 tablet; Refill: 3    5. Generalized anxiety disorder  Chronic, stable.  No changes.  Patient to call pharmacy after April 1 to fill medications  - buPROPion (WELLBUTRIN XL) 300 MG 24 hr tablet; Take 1 tablet (300 mg) by mouth every morning DO NOT CRUSH.  Dispense: 90 tablet; Refill: 3    6. Anxiety  Chronic, stable.  No changes.  Call pharmacy to fill medications.  - escitalopram (LEXAPRO) 20 MG tablet; Take 1 tablet (20 mg) by mouth daily  Dispense: 90 tablet; Refill: 3    7. Encounter for screening mammogram for breast cancer  Screen.  Patient should be receiving a phone call to schedule, if no phone call can call , should be able to schedule mammogram and bone scan for the same day.  - *MA Screening Digital Bilateral; Future    8. Postmenopausal status  Bone scan ordered, schedule at above number if patient does not hear from .  - DX Hip/Pelvis/Spine; Future    9. Colon cancer screening  Colon cancer screening,  to  call patient to set up.  - GASTROENTEROLOGY ADULT REF PROCEDURE ONLY; Future        Patient Education   Personalized Prevention Plan  You are due for the preventive services outlined below.  Your care team is available to assist you in scheduling these services.  If you have already completed any of these items, please share that information with your care team to update in your medical record.  Health Maintenance Due   Topic Date Due     COVID-19 Vaccine (1 of 2) Never done     Zoster (Shingles) Vaccine (1 of 2) Never done     Osteoporosis Screening  11/19/2016     Discuss Advance Care Planning  06/20/2018     Colorectal Cancer Screening  09/20/2019     Mammogram  05/02/2020     Flu Vaccine (1) 09/01/2020     Depression Assessment  10/17/2020     Annual Wellness Visit  12/19/2020     FALL RISK ASSESSMENT  Never done     Pneumococcal Vaccine (1 of 1 - PPSV23) 12/19/2020       Exercise for a Healthier Heart     Exercise with a friend. When activity is fun, you're more likely to stick with it.   You may wonder how you can improve the health of your heart. If you re thinking about exercise, you re on the right track. You don t need to become an athlete. But you do need a certain amount of brisk exercise to help strengthen your heart. If you have been diagnosed with a heart condition, your healthcare provider may advise exercise to help stabilize your condition. To help make exercise a habit, choose safe, fun activities.   Before you start  Check with your healthcare provider before starting an exercise program. This is especially important if you have not been active for a while. It's also important if you have a long-term (chronic) health problem such as heart disease, diabetes, or obesity. Or if you are at high risk for having these problems.   Why exercise?  Exercising regularly offers many healthy rewards. It can help you do all of the following:    Improve your blood cholesterol level to help prevent further heart  trouble    Lower your blood pressure to help prevent a stroke or heart attack    Control diabetes, or reduce your risk of getting this disease    Improve your heart and lung function    Reach and stay at a healthy weight    Make your muscles stronger so you can stay active    Prevent falls and fractures by slowing the loss of bone mass (osteoporosis)    Manage stress better    Reduce your blood pressure    Improve your sense of self and your body image  Exercise tips      Ease into your routine. Set small goals. Then build on them. If you are not sure what your activity level should be, talk with your healthcare provider first before starting an exercise routine.    Exercise on most days. Aim for a total of 150 minutes (2 hours and 30 minutes) or more of moderate-intensity aerobic activity each week. Or 75 minutes (1 hour and 15 minutes) or more of vigorous-intensity aerobic activity each week. Or try for a combination of both. Moderate activity means that you breathe heavier and your heart rate increases but you can still talk. Think about doing 40 minutes of moderate exercise, 3 to 4 times a week. For best results, activity should last for about 40 minutes to lower blood pressure and cholesterol. It's OK to work up to the 40-minute period over time. Examples of moderate-intensity activity are walking 1 mile in 15 minutes. Or doing 30 to 45 minutes of yard work.    Step up your daily activity level.  Along with your exercise program, try being more active the whole day. Walk instead of drive. Or park further away so that you take more steps each day. Do more household tasks or yard work. You may not be able to meet the advised mount of physical activity. But doing some moderate- or vigorous-intensity aerobic activity can help reduce your risk for heart disease. Your healthcare provider can help you figure out what is best for you.    Choose 1 or more activities you enjoy.  Walking is one of the easiest things you  can do. You can also try swimming, riding a bike, dancing, or taking an exercise class.    When to call your healthcare provider  Call your healthcare provider if you have any of these:     Chest pain or feel dizzy or lightheaded    Burning, tightness, pressure, or heaviness in your chest, neck, shoulders, back, or arms    Abnormal shortness of breath    More joint or muscle pain    A very fast or irregular heartbeat (palpitations)  Néstor last reviewed this educational content on 7/1/2019 2000-2020 [employer]. All rights reserved. This information is not intended as a substitute for professional medical care. Always follow your healthcare professional's instructions.

## 2021-03-16 ENCOUNTER — HOSPITAL ENCOUNTER (OUTPATIENT)
Facility: CLINIC | Age: 66
End: 2021-03-16
Attending: SURGERY | Admitting: SURGERY
Payer: COMMERCIAL

## 2021-03-16 DIAGNOSIS — Z11.59 ENCOUNTER FOR SCREENING FOR OTHER VIRAL DISEASES: ICD-10-CM

## 2021-03-16 ASSESSMENT — ANXIETY QUESTIONNAIRES: GAD7 TOTAL SCORE: 5

## 2021-03-30 ENCOUNTER — ANESTHESIA EVENT (OUTPATIENT)
Dept: GASTROENTEROLOGY | Facility: CLINIC | Age: 66
End: 2021-03-30

## 2021-03-30 RX ORDER — ONDANSETRON 2 MG/ML
4 INJECTION INTRAMUSCULAR; INTRAVENOUS
Status: CANCELLED | OUTPATIENT
Start: 2021-03-30

## 2021-03-30 RX ORDER — LIDOCAINE 40 MG/G
CREAM TOPICAL
Status: CANCELLED | OUTPATIENT
Start: 2021-03-30

## 2021-03-30 RX ORDER — SODIUM CHLORIDE, SODIUM LACTATE, POTASSIUM CHLORIDE, CALCIUM CHLORIDE 600; 310; 30; 20 MG/100ML; MG/100ML; MG/100ML; MG/100ML
INJECTION, SOLUTION INTRAVENOUS CONTINUOUS
Status: CANCELLED | OUTPATIENT
Start: 2021-03-30

## 2021-03-30 NOTE — ANESTHESIA PREPROCEDURE EVALUATION
Anesthesia Pre-Procedure Evaluation    Patient: Donita Lerner   MRN: [id number] : 1955        Preoperative Diagnosis: Colon cancer screening [Z12.11]   Procedure : Procedure(s):  COLONOSCOPY     Past Medical History:   Diagnosis Date     Arthritis      Depressive disorder, not elsewhere classified      Generalized anxiety disorder      Hypertension     No cardiologist     Meniere's disease 2005     Problem list name updated by automated process. Provider to review     Meniere's disease, unspecified      Sensorineural hearing loss, unspecified     left ear since birth complete     Unspecified episodic mood disorder     depression worsens winter SAD      Past Surgical History:   Procedure Laterality Date     ARTHROPLASTY KNEE  2013    Procedure: ARTHROPLASTY KNEE;  Right Total Knee Arthroplasty  ;  Surgeon: Ganesh Sheikh MD;  Location: [facility] OR     ARTHROPLASTY KNEE UNICOMPARTMENT Left 2016    Procedure: ARTHROPLASTY KNEE UNICOMPARTMENT;  Surgeon: Ganesh Sheikh MD;  Location: [facility] OR     C ANESTH, SECTION      x3     LAPAROSCOPIC CHOLECYSTECTOMY WITH CHOLANGIOGRAMS N/A 2018    Procedure: LAPAROSCOPIC CHOLECYSTECTOMY WITH CHOLANGIOGRAMS;  Laparoscopic Cholecystectomy with Intraoperative Cholangiogram;  Surgeon: Erick Rivera MD;  Location: [facility]      Allergies   Allergen Reactions     Penicillins Anaphylaxis and Swelling     Lisinopril Cough     Sulfa Drugs Rash      Social History     Tobacco Use     Smoking status: Never Smoker     Smokeless tobacco: Never Used   Substance Use Topics     Alcohol use: No      Wt Readings from Last 1 Encounters:   03/15/21 108.4 kg (239 lb)        Anesthesia Evaluation            ROS/MED HX  ENT/Pulmonary: Comment: Hearing loss    (+) CR risk factors, hypertension, obese,     Neurologic: Comment: Meniere's  CNS disorder      Cardiovascular:     (+) hypertension-----Previous cardiac testing   Echo: Date:  Results:    Stress Test: Date: Results:    ECG Reviewed: Date: 5/9/19 Results:  Marked sinus Bradycardia   -Old anterior infarct.     ABNORMAL     Cath: Date: Results:      METS/Exercise Tolerance:     Hematologic:       Musculoskeletal: Comment: Osteoporosis  Left TKA  (+) arthritis,     GI/Hepatic:       Renal/Genitourinary:       Endo:     (+) Obesity,     Psychiatric/Substance Use:     (+) psychiatric history depression, anxiety and other (comment) (SAD)     Infectious Disease:       Malignancy:       Other:               OUTSIDE LABS:  CBC:   Lab Results   Component Value Date    WBC 6.4 04/27/2018    WBC 6.9 04/18/2018    HGB 12.9 05/12/2018    HGB 14.0 05/11/2018    HCT 48.4 (H) 04/27/2018    HCT 46.4 04/18/2018     04/27/2018     04/18/2018     BMP:   Lab Results   Component Value Date     03/15/2021     04/03/2019    POTASSIUM 4.4 03/15/2021    POTASSIUM 4.3 04/03/2019    CHLORIDE 106 03/15/2021    CHLORIDE 108 04/03/2019    CO2 28 03/15/2021    CO2 27 04/03/2019    BUN 21 03/15/2021    BUN 26 04/03/2019    CR 1.15 (H) 03/15/2021    CR 0.89 04/03/2019    GLC 89 03/15/2021    GLC 93 04/03/2019     COAGS: No results found for: PTT, INR, FIBR  POC:   Lab Results   Component Value Date    BGM 92 06/17/2013    HCG Negative 07/24/2010    HCGS Negative 10/28/2007     HEPATIC:   Lab Results   Component Value Date    ALBUMIN 3.7 03/15/2021    PROTTOTAL 7.6 03/15/2021    ALT 14 03/15/2021    AST 12 03/15/2021    ALKPHOS 128 03/15/2021    BILITOTAL 0.5 03/15/2021     OTHER:   Lab Results   Component Value Date    A1C 5.5 03/28/2016    RHETT 9.8 03/15/2021    PHOS 4.1 03/22/2007    LIPASE 152 04/27/2018    TSH 0.74 04/21/2017               Kamaal Nicholson, TERRA CRNA

## 2021-03-31 ENCOUNTER — TELEPHONE (OUTPATIENT)
Dept: FAMILY MEDICINE | Facility: CLINIC | Age: 66
End: 2021-03-31

## 2021-03-31 ENCOUNTER — ANESTHESIA (OUTPATIENT)
Dept: GASTROENTEROLOGY | Facility: CLINIC | Age: 66
End: 2021-03-31

## 2021-03-31 DIAGNOSIS — Z12.11 COLON CANCER SCREENING: Primary | ICD-10-CM

## 2021-03-31 DIAGNOSIS — Z12.11 COLON CANCER SCREENING: ICD-10-CM

## 2021-03-31 NOTE — TELEPHONE ENCOUNTER
From: Donita Lerner   Sent: 3/30/2021   9:09 AM CDT   To: [facility] Reception Pool   Subject: Amanda Bain NP Nurse/Colonoscopy Appointment      Topic: Procedural Question      Nurse for Amanda Bain NP,      I missed my Covid test on Saturday as I had to attend a  and could not get there in time. Had to cancel my colonoscopy appointment on Wednesday, . I would like to do the Cologuard test instead this year. It is a 2 hour drive just to get tested when though I have had both my Covid Shots. My intestinal issues are now better with the Miralax and have never had colon issues. Would like to have a Cologuard test sent to me and do Colonoscopy later when restrictions are lifted. Please let me know. You can send message here or on my cell phone [phone]. Thank you.

## 2021-04-02 NOTE — PROGRESS NOTES
This encounter was created solely for the purpose of releasing the future order that was placed for Cologuard.  This is a necessary step in order for the results to be abstracted once they are available.    Zuleika Barnett

## 2021-04-19 LAB — COLOGUARD-ABSTRACT: NEGATIVE

## 2021-04-23 DIAGNOSIS — I10 ESSENTIAL HYPERTENSION, BENIGN: ICD-10-CM

## 2021-04-26 RX ORDER — METOPROLOL SUCCINATE 25 MG/1
TABLET, EXTENDED RELEASE ORAL
Qty: 90 TABLET | Refills: 0 | Status: SHIPPED | OUTPATIENT
Start: 2021-04-26 | End: 2021-05-12

## 2021-04-28 ENCOUNTER — MYC MEDICAL ADVICE (OUTPATIENT)
Dept: FAMILY MEDICINE | Facility: CLINIC | Age: 66
End: 2021-04-28

## 2021-04-28 DIAGNOSIS — R14.0 ABDOMINAL BLOATING: Primary | ICD-10-CM

## 2021-05-07 DIAGNOSIS — I10 ESSENTIAL HYPERTENSION, BENIGN: ICD-10-CM

## 2021-05-07 DIAGNOSIS — F41.9 ANXIETY: ICD-10-CM

## 2021-05-10 RX ORDER — AMLODIPINE BESYLATE 5 MG/1
7.5 TABLET ORAL DAILY
Qty: 135 TABLET | Refills: 3 | OUTPATIENT
Start: 2021-05-10

## 2021-05-10 RX ORDER — ESCITALOPRAM OXALATE 20 MG/1
20 TABLET ORAL DAILY
Qty: 90 TABLET | Refills: 3 | OUTPATIENT
Start: 2021-05-10

## 2021-05-10 RX ORDER — METOPROLOL SUCCINATE 25 MG/1
25 TABLET, EXTENDED RELEASE ORAL DAILY
Qty: 90 TABLET | Refills: 0 | OUTPATIENT
Start: 2021-05-10

## 2021-05-10 NOTE — TELEPHONE ENCOUNTER
"Requested Prescriptions   Pending Prescriptions Disp Refills     amLODIPine (NORVASC) 5 MG tablet 135 tablet 3     Sig: Take 1.5 tablets (7.5 mg) by mouth daily       Calcium Channel Blockers Protocol  Failed - 5/7/2021  1:42 PM        Failed - Normal serum creatinine on file in past 12 months     Recent Labs   Lab Test 03/15/21  0856   CR 1.15*       Ok to refill medication if creatinine is low          Passed - Blood pressure under 140/90 in past 12 months     BP Readings from Last 3 Encounters:   03/15/21 124/70   07/17/19 120/70   06/05/19 122/62                 Passed - Recent (12 mo) or future (30 days) visit within the authorizing provider's specialty     Patient has had an office visit with the authorizing provider or a provider within the authorizing providers department within the previous 12 mos or has a future within next 30 days. See \"Patient Info\" tab in [employer], or \"Choose Columns\" in Meds & Orders section of the refill encounter.              Passed - Medication is active on med list        Passed - Patient is age 18 or older        Passed - No active pregnancy on record        Passed - No positive pregnancy test in past 12 months           escitalopram (LEXAPRO) 20 MG tablet 90 tablet 3     Sig: Take 1 tablet (20 mg) by mouth daily       SSRIs Protocol Passed - 5/7/2021  1:42 PM        Passed - Recent (12 mo) or future (30 days) visit within the authorizing provider's specialty     Patient has had an office visit with the authorizing provider or a provider within the authorizing providers department within the previous 12 mos or has a future within next 30 days. See \"Patient Info\" tab in inbasket, or \"Choose Columns\" in Meds & Orders section of the refill encounter.              Passed - Medication is active on med list        Passed - Patient is age 18 or older        Passed - No active pregnancy on record        Passed - No positive pregnancy test in last 12 months           metoprolol succinate " "ER (TOPROL-XL) 25 MG 24 hr tablet 90 tablet 0     Sig: Take 1 tablet (25 mg) by mouth daily       Beta-Blockers Protocol Passed - 5/7/2021  1:42 PM        Passed - Blood pressure under 140/90 in past 12 months     BP Readings from Last 3 Encounters:   03/15/21 124/70   07/17/19 120/70   06/05/19 122/62                 Passed - Patient is age 6 or older        Passed - Recent (12 mo) or future (30 days) visit within the authorizing provider's specialty     Patient has had an office visit with the authorizing provider or a provider within the authorizing providers department within the previous 12 mos or has a future within next 30 days. See \"Patient Info\" tab in inbasket, or \"Choose Columns\" in Meds & Orders section of the refill encounter.              Passed - Medication is active on med list             "

## 2021-05-12 ENCOUNTER — HOSPITAL ENCOUNTER (OUTPATIENT)
Dept: MAMMOGRAPHY | Facility: CLINIC | Age: 66
End: 2021-05-12
Attending: NURSE PRACTITIONER
Payer: COMMERCIAL

## 2021-05-12 ENCOUNTER — HOSPITAL ENCOUNTER (OUTPATIENT)
Dept: BONE DENSITY | Facility: CLINIC | Age: 66
End: 2021-05-12
Attending: NURSE PRACTITIONER
Payer: COMMERCIAL

## 2021-05-12 DIAGNOSIS — Z12.31 ENCOUNTER FOR SCREENING MAMMOGRAM FOR BREAST CANCER: ICD-10-CM

## 2021-05-12 DIAGNOSIS — R14.0 ABDOMINAL BLOATING: ICD-10-CM

## 2021-05-12 DIAGNOSIS — Z78.0 POSTMENOPAUSAL STATUS: ICD-10-CM

## 2021-05-12 PROCEDURE — 83516 IMMUNOASSAY NONANTIBODY: CPT | Mod: 59 | Performed by: NURSE PRACTITIONER

## 2021-05-12 PROCEDURE — 77067 SCR MAMMO BI INCL CAD: CPT

## 2021-05-12 PROCEDURE — 36415 COLL VENOUS BLD VENIPUNCTURE: CPT | Performed by: NURSE PRACTITIONER

## 2021-05-12 PROCEDURE — 77080 DXA BONE DENSITY AXIAL: CPT

## 2021-05-12 PROCEDURE — 83516 IMMUNOASSAY NONANTIBODY: CPT | Performed by: NURSE PRACTITIONER

## 2021-05-13 LAB
TTG IGA SER-ACNC: <1 U/ML
TTG IGG SER-ACNC: <1 U/ML

## 2021-07-15 NOTE — NURSING NOTE
"Chief Complaint   Patient presents with     Consult     gallbladder       Initial /75 (BP Location: Right arm, Patient Position: Chair, Cuff Size: Adult Large)  Pulse 52  Temp 97.9  F (36.6  C) (Oral)  Resp 16  Ht 1.715 m (5' 7.5\")  Wt 123.1 kg (271 lb 6.2 oz)  LMP 07/01/2002  BMI 41.88 kg/m2 Estimated body mass index is 41.88 kg/(m^2) as calculated from the following:    Height as of this encounter: 1.715 m (5' 7.5\").    Weight as of this encounter: 123.1 kg (271 lb 6.2 oz).  Medications and allergies reviewed.    Ml MICHELLE, CMA    " Pt. will have 24-2 to determine if atrophy is present within the retina. If pt. passes visual field, pt. will be approved for Advanced vision.

## 2021-09-07 ENCOUNTER — HOSPITAL ENCOUNTER (OUTPATIENT)
Dept: CT IMAGING | Facility: CLINIC | Age: 66
Discharge: HOME | End: 2021-09-07
Attending: PHYSICIAN ASSISTANT | Admitting: PHYSICIAN ASSISTANT
Payer: COMMERCIAL

## 2021-09-07 ENCOUNTER — TELEPHONE (OUTPATIENT)
Dept: OTOLARYNGOLOGY | Facility: CLINIC | Age: 66
End: 2021-09-07

## 2021-09-07 ENCOUNTER — OFFICE VISIT (OUTPATIENT)
Dept: URGENT CARE | Facility: URGENT CARE | Age: 66
End: 2021-09-07
Payer: COMMERCIAL

## 2021-09-07 VITALS
HEART RATE: 78 BPM | TEMPERATURE: 98.4 F | RESPIRATION RATE: 16 BRPM | SYSTOLIC BLOOD PRESSURE: 122 MMHG | DIASTOLIC BLOOD PRESSURE: 70 MMHG | OXYGEN SATURATION: 96 %

## 2021-09-07 DIAGNOSIS — L02.11 CELLULITIS AND ABSCESS OF NECK: Primary | ICD-10-CM

## 2021-09-07 DIAGNOSIS — L03.221 CELLULITIS AND ABSCESS OF NECK: Primary | ICD-10-CM

## 2021-09-07 DIAGNOSIS — R22.1 NECK MASS: ICD-10-CM

## 2021-09-07 DIAGNOSIS — J02.9 SORE THROAT: ICD-10-CM

## 2021-09-07 DIAGNOSIS — L02.11 ABSCESS OF NECK: Primary | ICD-10-CM

## 2021-09-07 LAB
ALBUMIN SERPL-MCNC: 3.2 G/DL (ref 3.4–5)
ALP SERPL-CCNC: 149 U/L (ref 40–150)
ALT SERPL W P-5'-P-CCNC: 56 U/L (ref 0–50)
ANION GAP SERPL CALCULATED.3IONS-SCNC: 8 MMOL/L (ref 3–14)
AST SERPL W P-5'-P-CCNC: 36 U/L (ref 0–45)
BASOPHILS # BLD AUTO: 0 10E3/UL (ref 0–0.2)
BASOPHILS NFR BLD AUTO: 0 %
BILIRUB SERPL-MCNC: 0.7 MG/DL (ref 0.2–1.3)
BUN SERPL-MCNC: 23 MG/DL (ref 7–30)
CALCIUM SERPL-MCNC: 9.1 MG/DL (ref 8.5–10.1)
CHLORIDE BLD-SCNC: 102 MMOL/L (ref 94–109)
CO2 SERPL-SCNC: 24 MMOL/L (ref 20–32)
CREAT BLD-MCNC: 1.1 MG/DL (ref 0.5–1)
CREAT SERPL-MCNC: 1.03 MG/DL (ref 0.52–1.04)
DEPRECATED S PYO AG THROAT QL EIA: NEGATIVE
EOSINOPHIL # BLD AUTO: 0 10E3/UL (ref 0–0.7)
EOSINOPHIL NFR BLD AUTO: 0 %
ERYTHROCYTE [DISTWIDTH] IN BLOOD BY AUTOMATED COUNT: 14.3 % (ref 10–15)
GFR SERPL CREATININE-BSD FRML MDRD: 53 ML/MIN/1.73M2
GFR SERPL CREATININE-BSD FRML MDRD: 57 ML/MIN/1.73M2
GLUCOSE BLD-MCNC: 110 MG/DL (ref 70–99)
GROUP A STREP BY PCR: DETECTED
HCT VFR BLD AUTO: 43 % (ref 35–47)
HGB BLD-MCNC: 14.5 G/DL (ref 11.7–15.7)
LYMPHOCYTES # BLD AUTO: 1 10E3/UL (ref 0.8–5.3)
LYMPHOCYTES NFR BLD AUTO: 6 %
MCH RBC QN AUTO: 29.7 PG (ref 26.5–33)
MCHC RBC AUTO-ENTMCNC: 33.7 G/DL (ref 31.5–36.5)
MCV RBC AUTO: 88 FL (ref 78–100)
MONOCYTES # BLD AUTO: 1.1 10E3/UL (ref 0–1.3)
MONOCYTES NFR BLD AUTO: 6 %
NEUTROPHILS # BLD AUTO: 15.6 10E3/UL (ref 1.6–8.3)
NEUTROPHILS NFR BLD AUTO: 88 %
PLATELET # BLD AUTO: 179 10E3/UL (ref 150–450)
POTASSIUM BLD-SCNC: 3.7 MMOL/L (ref 3.4–5.3)
PROT SERPL-MCNC: 7.3 G/DL (ref 6.8–8.8)
RBC # BLD AUTO: 4.89 10E6/UL (ref 3.8–5.2)
SODIUM SERPL-SCNC: 134 MMOL/L (ref 133–144)
WBC # BLD AUTO: 17.7 10E3/UL (ref 4–11)

## 2021-09-07 PROCEDURE — 99215 OFFICE O/P EST HI 40 MIN: CPT | Performed by: PHYSICIAN ASSISTANT

## 2021-09-07 PROCEDURE — 250N000011 HC RX IP 250 OP 636: Performed by: PHYSICIAN ASSISTANT

## 2021-09-07 PROCEDURE — 87651 STREP A DNA AMP PROBE: CPT | Performed by: PHYSICIAN ASSISTANT

## 2021-09-07 PROCEDURE — 82565 ASSAY OF CREATININE: CPT | Performed by: PHYSICIAN ASSISTANT

## 2021-09-07 PROCEDURE — 85025 COMPLETE CBC W/AUTO DIFF WBC: CPT | Performed by: PHYSICIAN ASSISTANT

## 2021-09-07 PROCEDURE — 70491 CT SOFT TISSUE NECK W/DYE: CPT

## 2021-09-07 PROCEDURE — 36415 COLL VENOUS BLD VENIPUNCTURE: CPT | Performed by: PHYSICIAN ASSISTANT

## 2021-09-07 PROCEDURE — 250N000009 HC RX 250: Performed by: PHYSICIAN ASSISTANT

## 2021-09-07 PROCEDURE — 82565 ASSAY OF CREATININE: CPT

## 2021-09-07 RX ORDER — IOPAMIDOL 755 MG/ML
80 INJECTION, SOLUTION INTRAVASCULAR ONCE
Status: COMPLETED | OUTPATIENT
Start: 2021-09-07 | End: 2021-09-07

## 2021-09-07 RX ORDER — CLINDAMYCIN HCL 300 MG
300 CAPSULE ORAL 4 TIMES DAILY
Qty: 40 CAPSULE | Refills: 0 | Status: SHIPPED | OUTPATIENT
Start: 2021-09-07 | End: 2021-09-17

## 2021-09-07 RX ADMIN — SODIUM CHLORIDE 80 ML: 9 INJECTION, SOLUTION INTRAVENOUS at 12:34

## 2021-09-07 RX ADMIN — IOPAMIDOL 80 ML: 755 INJECTION, SOLUTION INTRAVENOUS at 12:33

## 2021-09-07 NOTE — TELEPHONE ENCOUNTER
Reason for Call:  Other appointment    Detailed comments: Carrie GARCIA would like care team to call her back.  provider requesting appt for pt before Friday.    Phone Number Patient can be reached at: Other phone number:  Doctor line ph:  [phone]    Best Time:     Can we leave a detailed message on this number? Not Applicable    Call taken on 9/7/2021 at 1:56 PM by Abbie Pedraza

## 2021-09-07 NOTE — TELEPHONE ENCOUNTER
This is not an outpatient appointment.      This needs to be discussed with the on call for ENT.  This patient will likely require emergent medical evaluation and possible surgical intervention.     IJL

## 2021-09-07 NOTE — TELEPHONE ENCOUNTER
Called [facility]. Carrie states on call ENT provider has been contacted and that it has been taken care of.    Abbie Pedraza  [facility]

## 2021-09-07 NOTE — PROGRESS NOTES
"    Assessment & Plan     Cellulitis and abscess of neck  Discussed case with ENT who will see her in clinic for follow up tomorrow. Will start clindamycin due to history of anaphylactic PCN allergy. Discussed in detail symptoms that would warrant emergent evaluation in the ED. Patient agrees with plan and will follow up with ENT tomorrow.       - Otolaryngology Referral; Future  - clindamycin (CLEOCIN) 300 MG capsule; Take 1 capsule (300 mg) by mouth 4 times daily for 10 days  - CBC with platelets and differential; Future  - Comprehensive metabolic panel; Future  - CBC with platelets and differential  - Comprehensive metabolic panel    Neck mass    - CT Soft Tissue Neck w Contrast; Future    Sore throat    - Streptococcus A Rapid Screen w/Reflex to PCR - Clinic Collect  - Group A Streptococcus PCR Throat Swab      60 minutes spent on the date of the encounter doing chart review, review of outside records, documentation, discussion with other provider(s) and coordination of care          BMI:   Estimated body mass index is 37.98 kg/m  as calculated from the following:    Height as of 9/8/21: 1.702 m (5' 7\").    Weight as of 9/8/21: 110 kg (242 lb 8.1 oz).           Return in about 1 day (around 9/8/2021).    Karishma Ford PA-C  [facility] [place]    Subjective   Chief Complaint   Patient presents with     Mass     9/5/21 Patient is here with a lump on the left side of neck last week had a sore throat still has one, flu like symptoms/possible food poisoning diarrhea and dry heeves.     Chills       HPI     Neck mass    Onset of symptoms was 1 week(s) ago.  Course of illness is on and off.    Severity mild  Current and Associated symptoms: sore throat  Treatment measures tried include Tylenol/Ibuprofen.  Predisposing factors include none.    Patient reports having sore throat and flu like symptoms last week. The last 2 days she started noticing left neck swelling, redness, and tenderness. " Symptoms are worsening. No recent scratches, abrasions, or wounds. No history of similar symptoms in the past. No fever but she just does not feel well.               Review of Systems   Constitutional, HEENT, cardiovascular, pulmonary, gi and gu systems are negative, except as otherwise noted.      Objective    /70 (BP Location: Right arm, Patient Position: Sitting, Cuff Size: Adult Regular)   Pulse 78   Temp 98.4  F (36.9  C) (Tympanic)   Resp 16   LMP 07/01/2002   SpO2 96%   There is no height or weight on file to calculate BMI.  Physical Exam  Constitutional:       Appearance: She is well-developed.   HENT:      Head: Normocephalic.      Right Ear: Tympanic membrane and ear canal normal.      Left Ear: Tympanic membrane and ear canal normal.   Eyes:      Conjunctiva/sclera: Conjunctivae normal.   Neck:      Comments: Left neck has significant swelling, redness, and tenderness. No visible abrasions or wounds.   Cardiovascular:      Rate and Rhythm: Normal rate.      Heart sounds: Normal heart sounds.   Pulmonary:      Effort: Pulmonary effort is normal.      Breath sounds: Normal breath sounds.   Skin:     General: Skin is warm and dry.      Findings: No rash.   Psychiatric:         Behavior: Behavior normal.            CT OF THE NECK WITH CONTRAST September 7, 2021 12:47 PM     IMPRESSION:  1. 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 However, infected cystic metastasis to  the neck cannot be completely excluded. Clinical evaluation and  continued surveillance recommended to document resolution after  treatment of infection and exclude associated or underlying mass.  2. Otherwise, normal soft tissue neck CT.     This imaging study was discussed with the ordering physician, Dr. KD HOUSE, by Dr. Loo on 9/7/2021 1:06  PM.

## 2021-09-07 NOTE — TELEPHONE ENCOUNTER
I spoke with the patient on the phone and reviewed her CT neck.  She has had worsening of left neck swelling, skin redness, warmth and tenderness over the last 2 days.  Her CT shows a possible infected brachial cleft cyst or lymph node abscess with cellulitis and edema of adjacent soft tissue. Her WBC is elevated at 17. She was started on oral clindamycin today as an outpatient.  My recommendation was for the patient to be seen in the emergency department at the [facility] today.  She did inform me that the Paul had already reached out to her for a clinic appointment tomorrow morning. I now see Dr. Moore's note in the chart. We discussed the options of her verifying that appointment this afternoon by calling, and if she feels no progression of symptoms while on the clindamycin overnight that is a likely satisfactory plan.  However, I explained to her if she feels that the neck symptoms are worsening overnight she should proceed to the emergency department at the [facility].  I did explain that this may require surgical drainage and IV antibiotics. She understood the plan and was okay in doing that.

## 2021-09-07 NOTE — TELEPHONE ENCOUNTER
Spoke with Karishma Ford, and called the patient. Patient is stable over past 24 hours and is now on antibiotics. We will see patient in ENT clinic tomorrow and perform a fine needle aspirate to rule out infected cystic metastasis. Patient is aware and agrees with plan. If no clear evidence of malignancy, she may need expedited surgical management.

## 2021-09-08 ENCOUNTER — OFFICE VISIT (OUTPATIENT)
Dept: OTOLARYNGOLOGY | Facility: CLINIC | Age: 66
End: 2021-09-08
Payer: COMMERCIAL

## 2021-09-08 ENCOUNTER — HOSPITAL ENCOUNTER (OUTPATIENT)
Facility: CLINIC | Age: 66
Setting detail: OBSERVATION
Discharge: HOME | End: 2021-09-10
Attending: OTOLARYNGOLOGY | Admitting: OTOLARYNGOLOGY
Payer: COMMERCIAL

## 2021-09-08 VITALS
DIASTOLIC BLOOD PRESSURE: 67 MMHG | TEMPERATURE: 96.8 F | WEIGHT: 242.51 LBS | OXYGEN SATURATION: 97 % | HEART RATE: 80 BPM | HEIGHT: 67 IN | SYSTOLIC BLOOD PRESSURE: 98 MMHG | BODY MASS INDEX: 38.06 KG/M2

## 2021-09-08 DIAGNOSIS — R22.1 NECK MASS: ICD-10-CM

## 2021-09-08 DIAGNOSIS — L02.11 ABSCESS OF NECK: Primary | ICD-10-CM

## 2021-09-08 LAB
ERYTHROCYTE [SEDIMENTATION RATE] IN BLOOD BY WESTERGREN METHOD: 38 MM/HR (ref 0–30)
LAB DIRECTOR COMMENTS: NORMAL
LAB DIRECTOR DISCLAIMER: NORMAL
LAB DIRECTOR INTERPRETATION: NORMAL
LAB DIRECTOR METHODOLOGY: NORMAL
LAB DIRECTOR RESULTS: NORMAL
LACTATE SERPL-SCNC: 1.2 MMOL/L (ref 0.7–2)
MAGNESIUM SERPL-MCNC: 2 MG/DL (ref 1.6–2.3)
POTASSIUM BLD-SCNC: 3 MMOL/L (ref 3.4–5.3)
SARS-COV-2 RNA RESP QL NAA+PROBE: NEGATIVE
SPECIMEN DESCRIPTION: NORMAL

## 2021-09-08 PROCEDURE — 258N000003 HC RX IP 258 OP 636: Performed by: STUDENT IN AN ORGANIZED HEALTH CARE EDUCATION/TRAINING PROGRAM

## 2021-09-08 PROCEDURE — 999N000127 HC STATISTIC PERIPHERAL IV START W US GUIDANCE

## 2021-09-08 PROCEDURE — 10021 FNA BX W/O IMG GDN 1ST LES: CPT | Mod: GC | Performed by: PATHOLOGY

## 2021-09-08 PROCEDURE — 96361 HYDRATE IV INFUSION ADD-ON: CPT

## 2021-09-08 PROCEDURE — 96374 THER/PROPH/DIAG INJ IV PUSH: CPT

## 2021-09-08 PROCEDURE — 87075 CULTR BACTERIA EXCEPT BLOOD: CPT | Performed by: REGISTERED NURSE

## 2021-09-08 PROCEDURE — 88305 TISSUE EXAM BY PATHOLOGIST: CPT | Mod: TC | Performed by: REGISTERED NURSE

## 2021-09-08 PROCEDURE — 84132 ASSAY OF SERUM POTASSIUM: CPT | Performed by: STUDENT IN AN ORGANIZED HEALTH CARE EDUCATION/TRAINING PROGRAM

## 2021-09-08 PROCEDURE — 85652 RBC SED RATE AUTOMATED: CPT | Performed by: OTOLARYNGOLOGY

## 2021-09-08 PROCEDURE — 36415 COLL VENOUS BLD VENIPUNCTURE: CPT | Performed by: OTOLARYNGOLOGY

## 2021-09-08 PROCEDURE — 83605 ASSAY OF LACTIC ACID: CPT | Performed by: OTOLARYNGOLOGY

## 2021-09-08 PROCEDURE — 87070 CULTURE OTHR SPECIMN AEROBIC: CPT | Performed by: REGISTERED NURSE

## 2021-09-08 PROCEDURE — 87624 HPV HI-RISK TYP POOLED RSLT: CPT | Performed by: PATHOLOGY

## 2021-09-08 PROCEDURE — 120N000002 HC R&B MED SURG/OB UMMC

## 2021-09-08 PROCEDURE — 86800 THYROGLOBULIN ANTIBODY: CPT | Performed by: REGISTERED NURSE

## 2021-09-08 PROCEDURE — 2894A PR VOIDCORRECT: CPT | Mod: 25 | Performed by: REGISTERED NURSE

## 2021-09-08 PROCEDURE — 31575 DIAGNOSTIC LARYNGOSCOPY: CPT | Performed by: REGISTERED NURSE

## 2021-09-08 PROCEDURE — 250N000011 HC RX IP 250 OP 636: Performed by: OTOLARYNGOLOGY

## 2021-09-08 PROCEDURE — G0452 MOLECULAR PATHOLOGY INTERPR: HCPCS | Mod: 26 | Performed by: PATHOLOGY

## 2021-09-08 PROCEDURE — 88172 CYTP DX EVAL FNA 1ST EA SITE: CPT | Mod: 26 | Performed by: PATHOLOGY

## 2021-09-08 PROCEDURE — U0005 INFEC AGEN DETEC AMPLI PROBE: HCPCS | Performed by: OTOLARYNGOLOGY

## 2021-09-08 PROCEDURE — 99223 1ST HOSP IP/OBS HIGH 75: CPT | Mod: GC | Performed by: OTOLARYNGOLOGY

## 2021-09-08 PROCEDURE — 250N000013 HC RX MED GY IP 250 OP 250 PS 637: Performed by: STUDENT IN AN ORGANIZED HEALTH CARE EDUCATION/TRAINING PROGRAM

## 2021-09-08 PROCEDURE — 88305 TISSUE EXAM BY PATHOLOGIST: CPT | Mod: 26 | Performed by: PATHOLOGY

## 2021-09-08 PROCEDURE — 88173 CYTOPATH EVAL FNA REPORT: CPT | Mod: 26 | Performed by: PATHOLOGY

## 2021-09-08 PROCEDURE — 36415 COLL VENOUS BLD VENIPUNCTURE: CPT | Performed by: STUDENT IN AN ORGANIZED HEALTH CARE EDUCATION/TRAINING PROGRAM

## 2021-09-08 PROCEDURE — 83735 ASSAY OF MAGNESIUM: CPT | Performed by: STUDENT IN AN ORGANIZED HEALTH CARE EDUCATION/TRAINING PROGRAM

## 2021-09-08 RX ORDER — CLINDAMYCIN PHOSPHATE 900 MG/50ML
900 INJECTION, SOLUTION INTRAVENOUS EVERY 8 HOURS
Status: DISCONTINUED | OUTPATIENT
Start: 2021-09-08 | End: 2021-09-10

## 2021-09-08 RX ORDER — VITAMIN B COMPLEX
50 TABLET ORAL DAILY
Status: CANCELLED | OUTPATIENT
Start: 2021-09-08

## 2021-09-08 RX ORDER — ACETAMINOPHEN 325 MG/1
325 TABLET ORAL EVERY 4 HOURS PRN
Status: DISCONTINUED | OUTPATIENT
Start: 2021-09-08 | End: 2021-09-08

## 2021-09-08 RX ORDER — NALOXONE HYDROCHLORIDE 0.4 MG/ML
0.2 INJECTION, SOLUTION INTRAMUSCULAR; INTRAVENOUS; SUBCUTANEOUS
Status: DISCONTINUED | OUTPATIENT
Start: 2021-09-08 | End: 2021-09-10 | Stop reason: HOSPADM

## 2021-09-08 RX ORDER — VITAMIN B COMPLEX
50 TABLET ORAL DAILY
Status: DISCONTINUED | OUTPATIENT
Start: 2021-09-09 | End: 2021-09-10 | Stop reason: HOSPADM

## 2021-09-08 RX ORDER — LIDOCAINE 40 MG/G
CREAM TOPICAL
Status: DISCONTINUED | OUTPATIENT
Start: 2021-09-08 | End: 2021-09-08

## 2021-09-08 RX ORDER — POLYETHYLENE GLYCOL 3350 17 G/17G
17 POWDER, FOR SOLUTION ORAL DAILY
Status: DISCONTINUED | OUTPATIENT
Start: 2021-09-08 | End: 2021-09-10 | Stop reason: HOSPADM

## 2021-09-08 RX ORDER — NALOXONE HYDROCHLORIDE 0.4 MG/ML
0.4 INJECTION, SOLUTION INTRAMUSCULAR; INTRAVENOUS; SUBCUTANEOUS
Status: DISCONTINUED | OUTPATIENT
Start: 2021-09-08 | End: 2021-09-10 | Stop reason: HOSPADM

## 2021-09-08 RX ORDER — OXYCODONE HYDROCHLORIDE 5 MG/1
5 TABLET ORAL EVERY 6 HOURS PRN
Status: DISCONTINUED | OUTPATIENT
Start: 2021-09-08 | End: 2021-09-10

## 2021-09-08 RX ORDER — OXYCODONE HYDROCHLORIDE 5 MG/1
5 TABLET ORAL EVERY 6 HOURS PRN
Status: DISCONTINUED | OUTPATIENT
Start: 2021-09-08 | End: 2021-09-08

## 2021-09-08 RX ORDER — POLYETHYLENE GLYCOL 3350 17 G/17G
17 POWDER, FOR SOLUTION ORAL DAILY
Status: DISCONTINUED | OUTPATIENT
Start: 2021-09-08 | End: 2021-09-08

## 2021-09-08 RX ORDER — ONDANSETRON 4 MG/1
4 TABLET, ORALLY DISINTEGRATING ORAL EVERY 6 HOURS PRN
Status: DISCONTINUED | OUTPATIENT
Start: 2021-09-08 | End: 2021-09-10 | Stop reason: HOSPADM

## 2021-09-08 RX ORDER — METOPROLOL SUCCINATE 25 MG/1
25 TABLET, EXTENDED RELEASE ORAL DAILY
Status: DISCONTINUED | OUTPATIENT
Start: 2021-09-09 | End: 2021-09-10 | Stop reason: HOSPADM

## 2021-09-08 RX ORDER — METOPROLOL SUCCINATE 25 MG/1
25 TABLET, EXTENDED RELEASE ORAL DAILY
Status: CANCELLED | OUTPATIENT
Start: 2021-09-08

## 2021-09-08 RX ORDER — ACETAMINOPHEN 325 MG/1
325 TABLET ORAL EVERY 4 HOURS PRN
Status: DISCONTINUED | OUTPATIENT
Start: 2021-09-08 | End: 2021-09-10 | Stop reason: HOSPADM

## 2021-09-08 RX ORDER — BUPROPION HYDROCHLORIDE 150 MG/1
300 TABLET ORAL EVERY MORNING
Status: DISCONTINUED | OUTPATIENT
Start: 2021-09-09 | End: 2021-09-10 | Stop reason: HOSPADM

## 2021-09-08 RX ORDER — BUPROPION HYDROCHLORIDE 150 MG/1
300 TABLET ORAL EVERY MORNING
Status: CANCELLED | OUTPATIENT
Start: 2021-09-09

## 2021-09-08 RX ORDER — ESCITALOPRAM OXALATE 20 MG/1
20 TABLET ORAL DAILY
Status: DISCONTINUED | OUTPATIENT
Start: 2021-09-09 | End: 2021-09-10 | Stop reason: HOSPADM

## 2021-09-08 RX ORDER — LIDOCAINE 40 MG/G
CREAM TOPICAL
Status: DISCONTINUED | OUTPATIENT
Start: 2021-09-08 | End: 2021-09-10 | Stop reason: HOSPADM

## 2021-09-08 RX ORDER — SODIUM CHLORIDE, SODIUM LACTATE, POTASSIUM CHLORIDE, CALCIUM CHLORIDE 600; 310; 30; 20 MG/100ML; MG/100ML; MG/100ML; MG/100ML
INJECTION, SOLUTION INTRAVENOUS CONTINUOUS
Status: DISCONTINUED | OUTPATIENT
Start: 2021-09-08 | End: 2021-09-08

## 2021-09-08 RX ORDER — ONDANSETRON 2 MG/ML
4 INJECTION INTRAMUSCULAR; INTRAVENOUS EVERY 6 HOURS PRN
Status: DISCONTINUED | OUTPATIENT
Start: 2021-09-08 | End: 2021-09-10 | Stop reason: HOSPADM

## 2021-09-08 RX ORDER — SODIUM CHLORIDE, SODIUM LACTATE, POTASSIUM CHLORIDE, CALCIUM CHLORIDE 600; 310; 30; 20 MG/100ML; MG/100ML; MG/100ML; MG/100ML
INJECTION, SOLUTION INTRAVENOUS CONTINUOUS
Status: DISCONTINUED | OUTPATIENT
Start: 2021-09-08 | End: 2021-09-09

## 2021-09-08 RX ORDER — ESCITALOPRAM OXALATE 20 MG/1
20 TABLET ORAL DAILY
Status: CANCELLED | OUTPATIENT
Start: 2021-09-08

## 2021-09-08 RX ADMIN — CLINDAMYCIN IN 5 PERCENT DEXTROSE 900 MG: 18 INJECTION, SOLUTION INTRAVENOUS at 20:11

## 2021-09-08 RX ADMIN — Medication 1 MG: at 20:55

## 2021-09-08 RX ADMIN — SODIUM CHLORIDE, POTASSIUM CHLORIDE, SODIUM LACTATE AND CALCIUM CHLORIDE: 600; 310; 30; 20 INJECTION, SOLUTION INTRAVENOUS at 19:20

## 2021-09-08 ASSESSMENT — PAIN SCALES - GENERAL: PAINLEVEL: MODERATE PAIN (5)

## 2021-09-08 ASSESSMENT — MIFFLIN-ST. JEOR: SCORE: 1677.63

## 2021-09-08 ASSESSMENT — ACTIVITIES OF DAILY LIVING (ADL): ADLS_ACUITY_SCORE: 12

## 2021-09-08 NOTE — NURSING NOTE
"Chief Complaint   Patient presents with     Consult     neck abcess     Blood pressure 98/67, pulse 80, temperature 96.8  F (36  C), height 1.702 m (5' 7\"), weight 110 kg (242 lb 8.1 oz), last menstrual period 07/01/2002, SpO2 97 %, not currently breastfeeding.    Santosh Duncan LPN    "

## 2021-09-08 NOTE — TELEPHONE ENCOUNTER
Routing to provider, ok to fit in this week or wait till next available opening on 9/15. Please advise urgent referral.      Olesya Arreola MA

## 2021-09-08 NOTE — LETTER
9/8/2021       RE: Donita Lerner  [id number] [facility] [id number]     Dear Colleague,    Thank you for referring your patient, Donita Lerner, to the [facility] [place] at [facility]. Please see a copy of my visit note below.    [facility]   Head and Neck Surgery  September 8, 2021     HPI: Donita Lerner is a 65 year old female who presents today with a left cystic neck mass. Patient states that she noticed this mass on Sunday 9/5/21. One week history of a scratchy throat with nausea and dry heaves. Patient's  had similar symptoms but they have since resolved for him. Patient was seen by primary care yesterday due to ongoing left neck swelling. Rapid strep was negative. 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 On call ENT provider was consulted. Patient was placed on oral clindamycin due to pcn allergy and instructed to follow up in clinic today for FNA and further assessment.     Patient comes in today with . Reports that her left neck mass seems to be getting bigger with increased redness that is now moving down to her clavicle. Range of motion of neck is difficult due to pain and tightness on the left side. She reports feeling a bit more short of breath and dizzy. She is concerned because her BP is lower than it typically runs. She reports that her SBP in usually in the 120s, today it is 98. Patient continues to have scratchy throat and nausea. She has had intermittent chills. Has not been eating and drinking consistently due to nausea.     Mother with a history of lymphoma, son has history of thyroid cancer. Patient is a never smoker. Does not drink alcohol.    Past  Medical History:  Past Medical History:   Diagnosis Date     Arthritis      Depressive disorder, not elsewhere classified      Generalized anxiety disorder      Hypertension     No cardiologist     Meniere's disease 2005     Problem list name updated by automated process. Provider to review     Meniere's disease, unspecified      Sensorineural hearing loss, unspecified     left ear since birth complete     Unspecified episodic mood disorder     depression worsens winter SADD     Past Surgical History:  Past Surgical History:   Procedure Laterality Date     ARTHROPLASTY KNEE  2013    Procedure: ARTHROPLASTY KNEE;  Right Total Knee Arthroplasty  ;  Surgeon: Ganesh Sheikh MD;  Location: [facility] OR     ARTHROPLASTY KNEE UNICOMPARTMENT Left 2016    Procedure: ARTHROPLASTY KNEE UNICOMPARTMENT;  Surgeon: Ganesh Sheikh MD;  Location: [facility] OR     C ANESTH, SECTION      x3     LAPAROSCOPIC CHOLECYSTECTOMY WITH CHOLANGIOGRAMS N/A 2018    Procedure: LAPAROSCOPIC CHOLECYSTECTOMY WITH CHOLANGIOGRAMS;  Laparoscopic Cholecystectomy with Intraoperative Cholangiogram;  Surgeon: Erick Rivera MD;  Location: [facility] OR     Medications:  Current Outpatient Medications   Medication Sig Dispense Refill     amLODIPine (NORVASC) 5 MG tablet Take 1.5 tablets (7.5 mg) by mouth daily 135 tablet 1     aspirin 81 MG tablet Take 1 tablet (81 mg) by mouth daily 90 tablet 3     buPROPion (WELLBUTRIN XL) 300 MG 24 hr tablet Take 1 tablet (300 mg) by mouth every morning DO NOT CRUSH. 90 tablet 1     Cholecalciferol (VITAMIN D) 2000 UNITS tablet Take 2,000 Units by mouth daily 100 tablet 3     clindamycin (CLEOCIN) 300 MG capsule Take 1 capsule (300 mg) by mouth 4 times daily for 10 days 40 capsule 0     escitalopram (LEXAPRO) 20 MG tablet Take 1 tablet (20 mg) by mouth daily 90 tablet 1     metoprolol succinate ER (TOPROL-XL) 25 MG 24 hr tablet Take 1 tablet (25 mg) by mouth daily 90 tablet 0     order  "for DME Equipment being ordered: LILA compression stockings 1 Units 0     Allergies:  Allergies   Allergen Reactions     Penicillins Anaphylaxis and Swelling     Lisinopril Cough     Sulfa Drugs Rash      Social History:  Social History     Tobacco Use     Smoking status: Never Smoker     Smokeless tobacco: Never Used   Substance Use Topics     Alcohol use: No     Drug use: No     ROS: 10 point ROS neg other than the symptoms noted above in the HPI.    Physical Exam:    BP 98/67   Pulse 80   Temp 96.8  F (36  C)   Ht 1.702 m (5' 7\")   Wt 110 kg (242 lb 8.1 oz)   LMP 07/01/2002   SpO2 97%   BMI 37.98 kg/m       Constitutional:  The patient was well-groomed and in no acute distress.     Neurologic: Alert and oriented x 3.    Psychiatric: The patient's affect was calm, cooperative, and appropriate.     Communication:  Normal; communicates verbally, normal voice quality.    Respiratory: Breathing comfortably without stridor or exertion of accessory muscles.    Eyes: Pupils were equal and reactive.  Extraocular movement intact.    Ears: Pinnae and tragus non-tender.  Bilateral cerumen impaction.   Oral Cavity: Normal tongue, floor of mouth, buccal mucosa, and palate.  No lesions or masses on inspection or palpation.     Oropharynx: Normal mucosa, palate symmetric with normal elevation. No abnormal lymph tissue in the oropharynx.  Base of tongue is soft with palpation.   Neck: Large left level IIb neck mass that is tender to touch. Diffuse redness and warm on mass and surrounding skin. This was marked with surgical marker.    Lymphatic: There is no other palpable lymphadenopathy in the neck.      Flexible fiberoptic laryngoscopy: Scope exam was indicated due to left neck mass. Verbal consent was obtained. The nasal cavity was prepped with an aerosolized solution of topical anesthetic and vasoconstrictive agent. The scope was passed through the anterior nasal cavity and advanced. Inspection of the nasopharynx " revealed no gross abnormality. The base of tongue and vallecula are normal. The epiglottis, AE folds, false cords, true cords, arytenoids are normal. Inspection of the larynx revealed bilaterally mobile vocal cords. Pyriform sinuses are symmetric. The airway is patent. Procedure tolerated well with no immediate complications noted.    Labs and Imaging Reviewed:  Imaging:  CT neck   9/7/21    TSH   Date Value Ref Range Status   04/21/2017 0.74 0.40 - 4.00 mU/L Final     Assessment/Plan:  1. Neck mass  Patient with enlarging cystic neck mass. Pathology performed FNA in clinic today. Will send for cultures, pathology, and thyroglobulin. It may be difficult in this setting to confidently rule out a metastases due to overlying infection. There were no concerning masses or lesions seen on today's scope exam. I am more concerned of patient's worsening symptoms of dizziness, hypotension, and increased erythema. Discussed case with Dr. Flores who also assessed patient and agreed that a hospital admission is needed for IV antibiotics and fluids. Dr. Moore, who is currently on call, was also updated regarding plan of care. Report has been called to 6A for admission.     Julia Steiner DNP, APRN, CNP  Otolaryngology  Head & Neck Surgery  [phone]    60 minutes spent on the date of the encounter doing chart review, history and exam, documentation and further activities per the note        Again, thank you for allowing me to participate in the care of your patient.      Sincerely,    Allyson Steiner, NP

## 2021-09-08 NOTE — PROGRESS NOTES
[facility]   Head and Neck Surgery  September 8, 2021     HPI: Donita Lerner is a 65 year old female who presents today with a left cystic neck mass. Patient states that she noticed this mass on Sunday 9/5/21. One week history of a scratchy throat with nausea and dry heaves. Patient's  had similar symptoms but they have since resolved for him. Patient was seen by primary care yesterday due to ongoing left neck swelling. Rapid strep was negative. 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 On call ENT provider was consulted. Patient was placed on oral clindamycin due to pcn allergy and instructed to follow up in clinic today for FNA and further assessment.     Patient comes in today with . Reports that her left neck mass seems to be getting bigger with increased redness that is now moving down to her clavicle. Range of motion of neck is difficult due to pain and tightness on the left side. She reports feeling a bit more short of breath and dizzy. She is concerned because her BP is lower than it typically runs. She reports that her SBP in usually in the 120s, today it is 98. Patient continues to have scratchy throat and nausea. She has had intermittent chills. Has not been eating and drinking consistently due to nausea.     Mother with a history of lymphoma, son has history of thyroid cancer. Patient is a never smoker. Does not drink alcohol.    Past Medical History:  Past Medical History:   Diagnosis Date     Arthritis      Depressive disorder, not elsewhere classified      Generalized anxiety disorder      Hypertension     No cardiologist     Meniere's disease 5/17/2005     Problem list name updated by automated process. Provider to review     Meniere's disease,  unspecified      Sensorineural hearing loss, unspecified     left ear since birth complete     Unspecified episodic mood disorder     depression worsens winter SADD     Past Surgical History:  Past Surgical History:   Procedure Laterality Date     ARTHROPLASTY KNEE  2013    Procedure: ARTHROPLASTY KNEE;  Right Total Knee Arthroplasty  ;  Surgeon: Ganesh Sheikh MD;  Location: [facility] OR     ARTHROPLASTY KNEE UNICOMPARTMENT Left 2016    Procedure: ARTHROPLASTY KNEE UNICOMPARTMENT;  Surgeon: Ganesh Sheikh MD;  Location: [facility] OR     C ANESTH, SECTION      x3     LAPAROSCOPIC CHOLECYSTECTOMY WITH CHOLANGIOGRAMS N/A 2018    Procedure: LAPAROSCOPIC CHOLECYSTECTOMY WITH CHOLANGIOGRAMS;  Laparoscopic Cholecystectomy with Intraoperative Cholangiogram;  Surgeon: Erick Rivera MD;  Location: [facility] OR     Medications:  Current Outpatient Medications   Medication Sig Dispense Refill     amLODIPine (NORVASC) 5 MG tablet Take 1.5 tablets (7.5 mg) by mouth daily 135 tablet 1     aspirin 81 MG tablet Take 1 tablet (81 mg) by mouth daily 90 tablet 3     buPROPion (WELLBUTRIN XL) 300 MG 24 hr tablet Take 1 tablet (300 mg) by mouth every morning DO NOT CRUSH. 90 tablet 1     Cholecalciferol (VITAMIN D) 2000 UNITS tablet Take 2,000 Units by mouth daily 100 tablet 3     clindamycin (CLEOCIN) 300 MG capsule Take 1 capsule (300 mg) by mouth 4 times daily for 10 days 40 capsule 0     escitalopram (LEXAPRO) 20 MG tablet Take 1 tablet (20 mg) by mouth daily 90 tablet 1     metoprolol succinate ER (TOPROL-XL) 25 MG 24 hr tablet Take 1 tablet (25 mg) by mouth daily 90 tablet 0     order for DME Equipment being ordered: LILA compression stockings 1 Units 0     Allergies:  Allergies   Allergen Reactions     Penicillins Anaphylaxis and Swelling     Lisinopril Cough     Sulfa Drugs Rash      Social History:  Social History     Tobacco Use     Smoking status: Never Smoker     Smokeless tobacco: Never Used  "  Substance Use Topics     Alcohol use: No     Drug use: No     ROS: 10 point ROS neg other than the symptoms noted above in the HPI.    Physical Exam:    BP 98/67   Pulse 80   Temp 96.8  F (36  C)   Ht 1.702 m (5' 7\")   Wt 110 kg (242 lb 8.1 oz)   LMP 07/01/2002   SpO2 97%   BMI 37.98 kg/m       Constitutional:  The patient was well-groomed and in no acute distress.     Neurologic: Alert and oriented x 3.    Psychiatric: The patient's affect was calm, cooperative, and appropriate.     Communication:  Normal; communicates verbally, normal voice quality.    Respiratory: Breathing comfortably without stridor or exertion of accessory muscles.    Eyes: Pupils were equal and reactive.  Extraocular movement intact.    Ears: Pinnae and tragus non-tender.  Bilateral cerumen impaction.   Oral Cavity: Normal tongue, floor of mouth, buccal mucosa, and palate.  No lesions or masses on inspection or palpation.     Oropharynx: Normal mucosa, palate symmetric with normal elevation. No abnormal lymph tissue in the oropharynx.  Base of tongue is soft with palpation.   Neck: Large left level IIb neck mass that is tender to touch. Diffuse redness and warm on mass and surrounding skin. This was marked with surgical marker.    Lymphatic: There is no other palpable lymphadenopathy in the neck.      Flexible fiberoptic laryngoscopy: Scope exam was indicated due to left neck mass. Verbal consent was obtained. The nasal cavity was prepped with an aerosolized solution of topical anesthetic and vasoconstrictive agent. The scope was passed through the anterior nasal cavity and advanced. Inspection of the nasopharynx revealed no gross abnormality. The base of tongue and vallecula are normal. The epiglottis, AE folds, false cords, true cords, arytenoids are normal. Inspection of the larynx revealed bilaterally mobile vocal cords. Pyriform sinuses are symmetric. The airway is patent. Procedure tolerated well with no immediate complications " noted.    Labs and Imaging Reviewed:  Imaging:  CT neck   9/7/21    TSH   Date Value Ref Range Status   04/21/2017 0.74 0.40 - 4.00 mU/L Final     Assessment/Plan:  1. Neck mass  Patient with enlarging cystic neck mass. Pathology performed FNA in clinic today. Will send for cultures, pathology, and thyroglobulin. It may be difficult in this setting to confidently rule out a metastases due to overlying infection. There were no concerning masses or lesions seen on today's scope exam. I am more concerned of patient's worsening symptoms of dizziness, hypotension, and increased erythema. Discussed case with Dr. Flores who also assessed patient and agreed that a hospital admission is needed for IV antibiotics and fluids. Dr. Moore, who is currently on call, was also updated regarding plan of care. Report has been called to 6A for admission.     Julia Steiner DNP, APRN, CNP  Otolaryngology  Head & Neck Surgery  [phone]    60 minutes spent on the date of the encounter doing chart review, history and exam, documentation and further activities per the note

## 2021-09-08 NOTE — TELEPHONE ENCOUNTER
Called and spoke with patient with update that she is scheduled for consult with TODD Dumont tomorrow morning at 10:40. Reviewed date, time and location with patient. She verbalized understanding and was encouraged to call with further questions or concerns.     Ashly Patricia RN, BSN

## 2021-09-08 NOTE — TELEPHONE ENCOUNTER
Pt scheduled at the [facility] 9/8, closing encounter.      Olesya Arreola MA

## 2021-09-08 NOTE — H&P
Otolaryngology H&P Note  2021    HPI: Donita Lerner is a 65 year old female with a past medical history of HTN, and a prior stroke (incidental finding on prior MRI), who presents with a neck abscess.    One week ago, she and her  had a minor sore throat. They have both been vaccinated for COVID19, but have not recently been tested. On  she presented to clinic at [facility] complaining of symptoms that felt like food poisoning (diarrhea, dry-heaving), as well as a lump on the left side of her neck and a sore throat. She was then scheduled for FNA of the neck mass to evaluate for malignancy. She also had CT imaging significant for a mass/collection in the left neck measuring 2.8 x 2.4 x 4.1 cm, as well as an elevated white count to 17.7. She was started on clindamycin yesterday  (anaphylactic allergy to penicillin), and her symptoms have failed to improve. When she presented to clinic for FNA today , she was sent to the [facility] to be admitted.    She has never had anything like this before. The area is tender but she feels like it is getting better since she started the clindamycin. Her primary complaint right now is that she feels dehydrated. She denies foul taste in the mouth, and has had no recent dental work. Denies heart burn. She denies feelings of her throat closing up. She is not having difficulty swallowing. She is allergic to penicillins but has previously tolerated  macrolides. She also does not tolerate sulfa drugs (turns bright red).    Family History:   Mother:  of lymphoma which, notably, presented with similar neck swelling.   Son: thyroid cancer  Niece: thyroid cancer    Social History:  No history of smoking  Does not drink    Meds:  Wellbutrin  Metoprolol  Amlodipine    Past Medical History:   Diagnosis Date    Arthritis     Depressive disorder, not elsewhere classified     Generalized anxiety disorder     Hypertension     No cardiologist     Meniere's disease 2005     Problem list name updated by automated process. Provider to review    Meniere's disease, unspecified     Sensorineural hearing loss, unspecified     left ear since birth complete    Unspecified episodic mood disorder     depression worsens winter SADD       Past Surgical History:   Procedure Laterality Date    ARTHROPLASTY KNEE  2013    Procedure: ARTHROPLASTY KNEE;  Right Total Knee Arthroplasty  ;  Surgeon: Ganesh Sheikh MD;  Location: [facility] OR    ARTHROPLASTY KNEE UNICOMPARTMENT Left 2016    Procedure: ARTHROPLASTY KNEE UNICOMPARTMENT;  Surgeon: Ganesh Sheikh MD;  Location: [facility] OR    C ANESTH, SECTION      x3    LAPAROSCOPIC CHOLECYSTECTOMY WITH CHOLANGIOGRAMS N/A 2018    Procedure: LAPAROSCOPIC CHOLECYSTECTOMY WITH CHOLANGIOGRAMS;  Laparoscopic Cholecystectomy with Intraoperative Cholangiogram;  Surgeon: Erick Rivera MD;  Location: [facility]       Current Outpatient Medications   Medication Sig Dispense Refill    amLODIPine (NORVASC) 5 MG tablet Take 1.5 tablets (7.5 mg) by mouth daily 135 tablet 1    aspirin 81 MG tablet Take 1 tablet (81 mg) by mouth daily 90 tablet 3    buPROPion (WELLBUTRIN XL) 300 MG 24 hr tablet Take 1 tablet (300 mg) by mouth every morning DO NOT CRUSH. 90 tablet 1    Cholecalciferol (VITAMIN D) 2000 UNITS tablet Take 2,000 Units by mouth daily 100 tablet 3    clindamycin (CLEOCIN) 300 MG capsule Take 1 capsule (300 mg) by mouth 4 times daily for 10 days 40 capsule 0    escitalopram (LEXAPRO) 20 MG tablet Take 1 tablet (20 mg) by mouth daily 90 tablet 1    metoprolol succinate ER (TOPROL-XL) 25 MG 24 hr tablet Take 1 tablet (25 mg) by mouth daily 90 tablet 0    order for DME Equipment being ordered: LILA compression stockings 1 Units 0          Allergies   Allergen Reactions    Penicillins Anaphylaxis and Swelling    Lisinopril Cough    Sulfa Drugs Rash       Social History     Socioeconomic History    Marital  status:      Spouse name: Alex    Number of children: 3    Years of education: 16    Highest education level: Not on file   Occupational History    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     Employer: [employer]   Tobacco Use    Smoking status: Never Smoker    Smokeless tobacco: Never Used   Substance and Sexual Activity    Alcohol use: No    Drug use: No    Sexual activity: Yes     Partners: Male     Birth control/protection: Surgical   Other Topics Concern    Parent/sibling w/ CABG, MI or angioplasty before 65F 55M? Not Asked   Social History Narrative    Not on file     Social Determinants of Health     Financial Resource Strain:     Difficulty of Paying Living Expenses:    Food Insecurity:     Worried About Running Out of Food in the Last Year:     Ran Out of Food in the Last Year:    Transportation Needs:     Lack of Transportation (Medical):     Lack of Transportation (Non-Medical):    Physical Activity:     Days of Exercise per Week:     Minutes of Exercise per Session:    Stress:     Feeling of Stress :    Social Connections:     Frequency of Communication with Friends and Family:     Frequency of Social Gatherings with Friends and Family:     Attends Yarsanism Services:     Active Member of Clubs or Organizations:     Attends Club or Organization Meetings:     Marital Status:    Intimate Partner Violence:     Fear of Current or Ex-Partner:     Emotionally Abused:     Physically Abused:     Sexually Abused:        Family History   Problem Relation Age of Onset    Cancer Mother         lymphoma    Depression Mother     Cancer Father         lung    Psychotic Disorder Daughter         borderline personality disorder    Depression Brother        ROS: 12 point review of systems is negative unless noted in HPI.    PHYSICAL EXAM:  Temp: 98.3  F (36.8  C) Temp src: Oral BP: 124/81 Pulse: 96   Resp: 16 SpO2: 94 % O2 Device:  None (Room air)    General: laying in bed, no acute distress  HEAD: normocephalic, atraumatic  Face: symmetrical, no swelling, edema, or erythema, no facial droop  Eyes: EOMI  Nose: no anterior drainage  Mouth: moist, no ulcers, no jaw or tooth tenderness, tongue midline and symmetric, no obvious evidence of tooth decay  Oropharynx: tonsils within normal limits, uvula midline, no oropharyngeal erythema  Neck: left swelling along neck with overlying erythema spreading from mandible down toward clavicle. Palpable mass beneath the sternocleidomastoid at the level of the hyoid, no fluctuance appreciated.  Neuro: cranial nerves 2-12 grossly intact  Respiratory: Breathing non-labored, no stridor, no accessory muscle use.     ROUTINE IP LABS (Last four results)  BMP  Recent Labs   Lab 09/07/21  1409 09/07/21  1230     --    POTASSIUM 3.7  --    CHLORIDE 102  --    RHETT 9.1  --    CO2 24  --    BUN 23  --    CR 1.03 1.1*   *  --      CBC  Recent Labs   Lab 09/07/21  1413   WBC 17.7*   RBC 4.89   HGB 14.5   HCT 43.0   MCV 88   MCH 29.7   MCHC 33.7   RDW 14.3        INRNo lab results found in last 7 days.    Imaging:  CT Soft Tissue Neck (9/7)  IMPRESSION:  1. 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 However, infected cystic metastasis to  the neck cannot be completely excluded. Clinical evaluation and  continued surveillance recommended to document resolution after  treatment of infection and exclude associated or underlying mass.  2. Otherwise, normal soft tissue neck CT.    Assessment and Plan  Donita Lerner is a 65 year old female with a past medical history of HTN who presents with a recent history of sore throat, flu-like symptoms, and 3 day history of neck swelling with CT imaging significant for a mass vs fluid collection  in the suprahyoid left neck with surrounding fat stranding and edema, as well as a lab workup significant for a leukocytosis. Differential includes infected cystic metastasis from an unknown primary malignancy, or infected branchial cleft cyst. At this time she is not having evidence of respiratory compromise or sepsis, but requires admission for IV antibiotics. FNA results from 9/8 are pending.    Neuro:  - Pain control: PRN tylenol, oxycodone  - history of depression, daily wellbutrin    HEENT: neck abscess  - monitor erythema of skin overlying abscess. Patient care order placed to outline the erythema QShift to monitor for spreading  - follow-up FNA results to evaluate for malignancy  - antibiotics as below    Respiratory: no acute issues, no current evidence of respiratory compromise  - supplemental O2 PRN to keep sats >92%    CV/heme: hx HTN  - PTA amlodipine, metoprolol  - daily CBC    FEN/GI: no acute issues, OK for regular diet this evening, NPO at midnight in case there is an indication to operate tomorrow 9/9  - mIVF LR at 100ml/hr  - Bowel regimen: scheduled miralax  - daily BMP. Mg and K    : no acute issues  - voiding independently    Endo: no acute issues    ID: IV clindamycin  - trending inflammatory markers: CRP, WBC    PPX:  - SCDs  - IS    Mirta Vale MD  Otolaryngology-Head & Neck Surgery, PGY1  Please page ENT with questions by dialing [phone] and entering job code [phone] when prompted.    I, Margarette Moore MD, saw this patient with the resident and agree with the resident s findings and plan of care as documented in the resident s note. I personally reviewed vital signs, medications, labs, and imaging. Key findings: left neck mass, FNA pending. Appears infectious but evaluating for underlying malignancy.   Margarette Moore MD

## 2021-09-09 LAB
ANION GAP SERPL CALCULATED.3IONS-SCNC: 7 MMOL/L (ref 3–14)
BUN SERPL-MCNC: 17 MG/DL (ref 7–30)
CALCIUM SERPL-MCNC: 9.2 MG/DL (ref 8.5–10.1)
CHLORIDE BLD-SCNC: 107 MMOL/L (ref 94–109)
CO2 SERPL-SCNC: 23 MMOL/L (ref 20–32)
CREAT SERPL-MCNC: 0.86 MG/DL (ref 0.52–1.04)
CRP SERPL-MCNC: 140 MG/L (ref 0–8)
ERYTHROCYTE [DISTWIDTH] IN BLOOD BY AUTOMATED COUNT: 13.9 % (ref 10–15)
GFR SERPL CREATININE-BSD FRML MDRD: 71 ML/MIN/1.73M2
GLUCOSE BLD-MCNC: 113 MG/DL (ref 70–99)
HCT VFR BLD AUTO: 42.6 % (ref 35–47)
HGB BLD-MCNC: 13.8 G/DL (ref 11.7–15.7)
MCH RBC QN AUTO: 29.9 PG (ref 26.5–33)
MCHC RBC AUTO-ENTMCNC: 32.4 G/DL (ref 31.5–36.5)
MCV RBC AUTO: 92 FL (ref 78–100)
PLATELET # BLD AUTO: 187 10E3/UL (ref 150–450)
POTASSIUM BLD-SCNC: 3 MMOL/L (ref 3.4–5.3)
POTASSIUM BLD-SCNC: 3.6 MMOL/L (ref 3.4–5.3)
RBC # BLD AUTO: 4.62 10E6/UL (ref 3.8–5.2)
SODIUM SERPL-SCNC: 137 MMOL/L (ref 133–144)
WBC # BLD AUTO: 11 10E3/UL (ref 4–11)

## 2021-09-09 PROCEDURE — 250N000013 HC RX MED GY IP 250 OP 250 PS 637: Performed by: STUDENT IN AN ORGANIZED HEALTH CARE EDUCATION/TRAINING PROGRAM

## 2021-09-09 PROCEDURE — 80048 BASIC METABOLIC PNL TOTAL CA: CPT | Performed by: STUDENT IN AN ORGANIZED HEALTH CARE EDUCATION/TRAINING PROGRAM

## 2021-09-09 PROCEDURE — G0378 HOSPITAL OBSERVATION PER HR: HCPCS

## 2021-09-09 PROCEDURE — 250N000013 HC RX MED GY IP 250 OP 250 PS 637: Performed by: OTOLARYNGOLOGY

## 2021-09-09 PROCEDURE — 250N000011 HC RX IP 250 OP 636: Performed by: OTOLARYNGOLOGY

## 2021-09-09 PROCEDURE — 85014 HEMATOCRIT: CPT | Performed by: STUDENT IN AN ORGANIZED HEALTH CARE EDUCATION/TRAINING PROGRAM

## 2021-09-09 PROCEDURE — 96376 TX/PRO/DX INJ SAME DRUG ADON: CPT

## 2021-09-09 PROCEDURE — 36415 COLL VENOUS BLD VENIPUNCTURE: CPT | Performed by: STUDENT IN AN ORGANIZED HEALTH CARE EDUCATION/TRAINING PROGRAM

## 2021-09-09 PROCEDURE — 258N000003 HC RX IP 258 OP 636: Performed by: STUDENT IN AN ORGANIZED HEALTH CARE EDUCATION/TRAINING PROGRAM

## 2021-09-09 PROCEDURE — 96361 HYDRATE IV INFUSION ADD-ON: CPT

## 2021-09-09 PROCEDURE — 84132 ASSAY OF SERUM POTASSIUM: CPT | Performed by: STUDENT IN AN ORGANIZED HEALTH CARE EDUCATION/TRAINING PROGRAM

## 2021-09-09 PROCEDURE — 86140 C-REACTIVE PROTEIN: CPT | Performed by: STUDENT IN AN ORGANIZED HEALTH CARE EDUCATION/TRAINING PROGRAM

## 2021-09-09 RX ORDER — IBUPROFEN 200 MG
200 TABLET ORAL EVERY 6 HOURS PRN
Status: DISCONTINUED | OUTPATIENT
Start: 2021-09-09 | End: 2021-09-10 | Stop reason: HOSPADM

## 2021-09-09 RX ORDER — POTASSIUM CHLORIDE 750 MG/1
20 TABLET, EXTENDED RELEASE ORAL ONCE
Status: COMPLETED | OUTPATIENT
Start: 2021-09-09 | End: 2021-09-09

## 2021-09-09 RX ORDER — POTASSIUM CHLORIDE 750 MG/1
40 TABLET, EXTENDED RELEASE ORAL ONCE
Status: COMPLETED | OUTPATIENT
Start: 2021-09-09 | End: 2021-09-09

## 2021-09-09 RX ADMIN — POTASSIUM CHLORIDE 40 MEQ: 750 TABLET, EXTENDED RELEASE ORAL at 08:34

## 2021-09-09 RX ADMIN — SODIUM CHLORIDE, POTASSIUM CHLORIDE, SODIUM LACTATE AND CALCIUM CHLORIDE: 600; 310; 30; 20 INJECTION, SOLUTION INTRAVENOUS at 08:43

## 2021-09-09 RX ADMIN — BUPROPION HYDROCHLORIDE 300 MG: 150 TABLET, EXTENDED RELEASE ORAL at 08:34

## 2021-09-09 RX ADMIN — AMLODIPINE BESYLATE 7.5 MG: 5 TABLET ORAL at 08:34

## 2021-09-09 RX ADMIN — POLYETHYLENE GLYCOL 3350 17 G: 17 POWDER, FOR SOLUTION ORAL at 08:40

## 2021-09-09 RX ADMIN — METOPROLOL SUCCINATE 25 MG: 25 TABLET, EXTENDED RELEASE ORAL at 08:35

## 2021-09-09 RX ADMIN — POTASSIUM CHLORIDE 20 MEQ: 750 TABLET, EXTENDED RELEASE ORAL at 09:57

## 2021-09-09 RX ADMIN — Medication 50 MCG: at 08:34

## 2021-09-09 RX ADMIN — Medication 1 MG: at 22:42

## 2021-09-09 RX ADMIN — IBUPROFEN 200 MG: 200 TABLET, FILM COATED ORAL at 22:27

## 2021-09-09 RX ADMIN — CLINDAMYCIN IN 5 PERCENT DEXTROSE 900 MG: 18 INJECTION, SOLUTION INTRAVENOUS at 09:57

## 2021-09-09 RX ADMIN — CLINDAMYCIN IN 5 PERCENT DEXTROSE 900 MG: 18 INJECTION, SOLUTION INTRAVENOUS at 02:13

## 2021-09-09 RX ADMIN — IBUPROFEN 200 MG: 200 TABLET, FILM COATED ORAL at 16:05

## 2021-09-09 RX ADMIN — CLINDAMYCIN IN 5 PERCENT DEXTROSE 900 MG: 18 INJECTION, SOLUTION INTRAVENOUS at 18:10

## 2021-09-09 RX ADMIN — ESCITALOPRAM OXALATE 20 MG: 20 TABLET ORAL at 08:34

## 2021-09-09 RX ADMIN — IBUPROFEN 200 MG: 200 TABLET, FILM COATED ORAL at 08:33

## 2021-09-09 ASSESSMENT — ACTIVITIES OF DAILY LIVING (ADL)
ADLS_ACUITY_SCORE: 12

## 2021-09-09 NOTE — PLAN OF CARE
/78   Pulse 87   Temp 99.5  F (37.5  C) (Oral)   Resp 16   LMP 07/01/2002   SpO2 94%     Assumed care 0700 to 1530  Pt rounded on hourly  Ollie: alert and oriented   Pain: Pt has pain 3/10 in left neck  GI/: Denies nausea. Bowel sounds present. Good urine output clear yellow urine. Pt ate well this shift.   Cardiac: WDL  Respiratory: denies SOB lung sounds clear bilaterally  Skin: clean dry and intact Left neck swollen and red, Area outlined  Lines: PIV  infusing  ml/hr  Assist level: I  Will continue to monitor and follow plan of care.   Plan: Waiting on lab results to determine next steps, continue to monitor neck swelling   Pt took a shower this shift, linen changed    Call light in reach, Pt able to make needs known, Will continue to monitor and follow plan of care.

## 2021-09-09 NOTE — PLAN OF CARE
Status: Pt admitted for L neck abscess   Vitals: VSS on RA  Neuros: Intact  IV: PIV infusing LR @ 100ml/hr  Labs/Electrolytes: K+ 3, redraw this AM  Resp/trach: No reports of SOB, was having this at rest earlier d/t neck mass per report  Diet: NPO since 0000 for possible procedure  Bowel status: No BM overnight   : Voiding  Skin: L neck with erythema, swelling, and slightly firm to touch. Marked with no extension  Pain: Denied  Activity: Independent   Social: Slept well between cares  Plan: Continue with antibiotics

## 2021-09-09 NOTE — UTILIZATION REVIEW
"Admission Status; Secondary Review Determination     Under the authority of the Utilization Management Committee, the utilization review process indicated a secondary review on the above patient.  The review outcome is based on review of the medical records, discussions with staff, and applying clinical experience noted on the date of the review.          (x) Observation Status Appropriate - This patient does not meet hospital inpatient criteria and is placed in observation status. If this patient's primary payer is Medicare and was admitted as an inpatient, Condition Code 44 should be used and patient status changed to \"observation\".     RATIONALE FOR DETERMINATION   64 yo female with hypertension, h/o CVA who presented with a neck abscess. Was set for outpatient fine needle aspiration. Started on oral clindamycin as an outpatient. Now admitted to the hospital under the care of the ENT service. Getting IV clindamycin. WBC normalized 9/9/2021.     Given sepsis criteria not met (+leukocytosis but no fever, normal lactate, normotensive, not tachycardic, not hypoxic, etc), the cellulitic area is not located over a prosthesis, is not in the orbital area, the patient is not immunocompromised, inpatient criteria is not met.    The severity of illness, intensity of service provided, expected LOS and risk for adverse outcome make the care appropriate for further observation; however, doesn't meet criteria for hospital inpatient admission. Otolaryngology service notified of this determination via paging system.    Can be reviewed again tomorrow if there are any changes in condition or sepsis criteria are met.     This document was produced using voice recognition software.      The information on this document is developed by the utilization review team in order for the business office to ensure compliance.  This only denotes the appropriateness of proper admission status and does not reflect the quality of care rendered.   "       The definitions of Inpatient Status and Observation Status used in making the determination above are those provided in the CMS Coverage Manual, Chapter 1 and Chapter 6, section 70.4.      Sincerely,  Randa Elizondo MD  Utilization Review  Physician Advisor  [facility].

## 2021-09-09 NOTE — PLAN OF CARE
Status: admitted w/ possible L neck abcess  Vitals: VSS on RA   Neuros: AO x 4.Denies N/T.   IV: PIV infusing LR at 75ml/hr.   Labs/Electrolytes: WNL, lactic drawn for sepsis (1.2).  Resp/trach: LS clear, pt states SOB, continuous pulse ox present.  Diet: Regular diet, NPO at midnight for possible procedure.   Bowel status: No BM this shift, passing gas.  : Voiding spontaneously via toilet.  Skin: L neck swollen & reddened, marked at 1200 from ENT clinic, no extension noted.  Pain: No statements of pain this shift.   Activity: Up ad erich in room.   Social:  at bedside for a portion of the shift.  Plan: Continue to monitor and follow POC.   Updates this shift: PTA meds added for AM shift.

## 2021-09-09 NOTE — PLAN OF CARE
/81 (BP Location: Right arm)   Pulse 96   Temp 98.3  F (36.8  C) (Oral)   Resp 16   LMP 07/01/2002   SpO2 94%     Assumed cares [id number]  Neuro: A&Ox4  Pain/Nausea: denies pain and nausea  Cardiac: regular rate and rhythm  Resp: lung sounds clear and equal bilaterally  GI/: voids spontaneously, no BM this shift  Diet/Appetite: regular diet, good appetite  Skin: scab to R arm, red edematous mass to L neck  Access: no PIV  Activity: Ax1 with gait belt  Plan: NPO after midnight for tentative operation tomorrow  Will continue with plan of care and notify team of any changes.?

## 2021-09-09 NOTE — PROGRESS NOTES
Otolaryngology Progress Note    Subjective/Intvl events: NAEON, no extension of erythema noted, vss wnl, afeb tm 98.6. She states she is feeling better this morning and that her neck range of motion is improving. She is also requesting PRN tylenol be available.    O: /69 (BP Location: Right arm)   Pulse 96   Temp 98.6  F (37  C) (Oral)   Resp 16   LMP 07/01/2002   SpO2 96%   General: laying in bed, no acute distress  HEAD: normocephalic, atraumatic  Face: symmetrical, no swelling, edema, or erythema, no facial droop  Eyes: EOMI  Nose: no anterior drainage  Oropharynx: tonsils within normal limits, uvula midline, no oropharyngeal erythema  Neck: left swelling along neck with overlying erythema spreading from mandible down toward clavicle, not outside of the outlines which were drawn 9/8. Palpable mass beneath the sternocleidomastoid at the level of the hyoid, no fluctuance appreciated, less firm than prior.  Respiratory: Breathing non-labored, no stridor, no accessory muscle use.       LABS: AM labs pending today      BMPRecent Labs   Lab 09/08/21  1906 09/07/21  1409 09/07/21  1230   NA  --  134  --    POTASSIUM 3.0* 3.7  --    CHLORIDE  --  102  --    RHETT  --  9.1  --    CO2  --  24  --    BUN  --  23  --    CR  --  1.03 1.1*   GLC  --  110*  --      CBC  Recent Labs   Lab 09/07/21  1413   WBC 17.7*   RBC 4.89   HGB 14.5   HCT 43.0   MCV 88   MCH 29.7   MCHC 33.7   RDW 14.3        FNA: cultures and cytology pending    A/P: Donita Lerner is a 65 year old female with a past medical history of HTN who presents with a recent history of sore throat, flu-like symptoms, and 3 day history of neck swelling with CT imaging significant for a mass vs fluid collection in the suprahyoid left neck with surrounding fat stranding and edema, as well as a lab workup significant for a leukocytosis. Differential includes infected cystic metastasis from an unknown primary malignancy, or infected branchial cleft cyst.  At this time she is not having evidence of respiratory compromise or sepsis, but requires admission for IV antibiotics. FNA results from 9/8 are pending.     Neuro:  - Pain control: PRN tylenol, oxycodone, ibuprofen  - history of depression, daily wellbutrin     HEENT: neck abscess  - monitor erythema of skin overlying abscess. Patient care order placed to outline the erythema QShift to monitor for spreading; stable erythema  - follow-up FNA results to evaluate for malignancy  - antibiotics as below     Respiratory: no acute issues, no current evidence of respiratory compromise  - supplemental O2 PRN to keep sats >92%  - continuous pulse ox     CV/heme: hx HTN  - PTA amlodipine, metoprolol  - daily CBC     FEN/GI: no acute issues, currently NPO but will likely be able to have a regular diet later this morning, pending continued stability/improvement  - mIVF LR at 100ml/hr  - Bowel regimen: scheduled miralax  - daily BMP. Mg and K  - K low 9/9, repletion protocols ordered     : no acute issues  - voiding independently     Endo: no acute issues     ID: IV clindamycin x at least 24 hr  - will consider transition to PO clindamycin tomorrow AM  - trending inflammatory markers: CRP, WBC     PPX:  - SCDs  - IS    Mirta Vale MD  Otolaryngology-Head & Neck Surgery PGY1  Please contact ENT with questions by dialing [phone] and entering job code [phone] when prompted.

## 2021-09-09 NOTE — PROGRESS NOTES
Admitted/transferred from: clinic  2 RN full   skin assessment completed by BRENDA Thomas and BRENDA Pfeiffer.  Skin assessment finding: R forearm scab; L neck swelling and redness (marked by clinic at 12:00PM and no extension of redness)  Interventions/actions: will continue to monitor

## 2021-09-10 ENCOUNTER — TELEPHONE (OUTPATIENT)
Dept: URGENT CARE | Facility: URGENT CARE | Age: 66
End: 2021-09-10

## 2021-09-10 VITALS
TEMPERATURE: 97.8 F | HEART RATE: 74 BPM | OXYGEN SATURATION: 95 % | RESPIRATION RATE: 16 BRPM | SYSTOLIC BLOOD PRESSURE: 133 MMHG | DIASTOLIC BLOOD PRESSURE: 84 MMHG

## 2021-09-10 DIAGNOSIS — L03.221 CELLULITIS AND ABSCESS OF NECK: Primary | ICD-10-CM

## 2021-09-10 DIAGNOSIS — L02.11 ABSCESS OF NECK: Primary | ICD-10-CM

## 2021-09-10 DIAGNOSIS — L02.11 CELLULITIS AND ABSCESS OF NECK: Primary | ICD-10-CM

## 2021-09-10 LAB
ANION GAP SERPL CALCULATED.3IONS-SCNC: 7 MMOL/L (ref 3–14)
BACTERIA FLD CULT: ABNORMAL
BUN SERPL-MCNC: 18 MG/DL (ref 7–30)
CALCIUM SERPL-MCNC: 8.9 MG/DL (ref 8.5–10.1)
CHLORIDE BLD-SCNC: 108 MMOL/L (ref 94–109)
CO2 SERPL-SCNC: 26 MMOL/L (ref 20–32)
CREAT SERPL-MCNC: 0.76 MG/DL (ref 0.52–1.04)
CRP SERPL-MCNC: 78 MG/L (ref 0–8)
ERYTHROCYTE [DISTWIDTH] IN BLOOD BY AUTOMATED COUNT: 14.1 % (ref 10–15)
GFR SERPL CREATININE-BSD FRML MDRD: 83 ML/MIN/1.73M2
GLUCOSE BLD-MCNC: 95 MG/DL (ref 70–99)
HCT VFR BLD AUTO: 39.8 % (ref 35–47)
HGB BLD-MCNC: 13.1 G/DL (ref 11.7–15.7)
MAGNESIUM SERPL-MCNC: 1.9 MG/DL (ref 1.6–2.3)
MCH RBC QN AUTO: 29.2 PG (ref 26.5–33)
MCHC RBC AUTO-ENTMCNC: 32.9 G/DL (ref 31.5–36.5)
MCV RBC AUTO: 89 FL (ref 78–100)
PATH REPORT.COMMENTS IMP SPEC: NORMAL
PATH REPORT.COMMENTS IMP SPEC: NORMAL
PATH REPORT.FINAL DX SPEC: NORMAL
PATH REPORT.GROSS SPEC: NORMAL
PATH REPORT.MICROSCOPIC SPEC OTHER STN: NORMAL
PATH REPORT.RELEVANT HX SPEC: NORMAL
PLATELET # BLD AUTO: 172 10E3/UL (ref 150–450)
POTASSIUM BLD-SCNC: 3.4 MMOL/L (ref 3.4–5.3)
RBC # BLD AUTO: 4.48 10E6/UL (ref 3.8–5.2)
SODIUM SERPL-SCNC: 141 MMOL/L (ref 133–144)
WBC # BLD AUTO: 9.5 10E3/UL (ref 4–11)

## 2021-09-10 PROCEDURE — 80048 BASIC METABOLIC PNL TOTAL CA: CPT | Performed by: STUDENT IN AN ORGANIZED HEALTH CARE EDUCATION/TRAINING PROGRAM

## 2021-09-10 PROCEDURE — 250N000013 HC RX MED GY IP 250 OP 250 PS 637: Performed by: OTOLARYNGOLOGY

## 2021-09-10 PROCEDURE — G0378 HOSPITAL OBSERVATION PER HR: HCPCS

## 2021-09-10 PROCEDURE — 36415 COLL VENOUS BLD VENIPUNCTURE: CPT | Performed by: STUDENT IN AN ORGANIZED HEALTH CARE EDUCATION/TRAINING PROGRAM

## 2021-09-10 PROCEDURE — 250N000013 HC RX MED GY IP 250 OP 250 PS 637: Performed by: STUDENT IN AN ORGANIZED HEALTH CARE EDUCATION/TRAINING PROGRAM

## 2021-09-10 PROCEDURE — 83735 ASSAY OF MAGNESIUM: CPT | Performed by: STUDENT IN AN ORGANIZED HEALTH CARE EDUCATION/TRAINING PROGRAM

## 2021-09-10 PROCEDURE — 85027 COMPLETE CBC AUTOMATED: CPT | Performed by: STUDENT IN AN ORGANIZED HEALTH CARE EDUCATION/TRAINING PROGRAM

## 2021-09-10 PROCEDURE — 96376 TX/PRO/DX INJ SAME DRUG ADON: CPT

## 2021-09-10 PROCEDURE — 250N000011 HC RX IP 250 OP 636: Performed by: OTOLARYNGOLOGY

## 2021-09-10 PROCEDURE — 86140 C-REACTIVE PROTEIN: CPT | Performed by: STUDENT IN AN ORGANIZED HEALTH CARE EDUCATION/TRAINING PROGRAM

## 2021-09-10 RX ORDER — CLINDAMYCIN HCL 300 MG
300 CAPSULE ORAL EVERY 6 HOURS SCHEDULED
Status: DISCONTINUED | OUTPATIENT
Start: 2021-09-10 | End: 2021-09-10 | Stop reason: HOSPADM

## 2021-09-10 RX ORDER — CLINDAMYCIN HCL 300 MG
300 CAPSULE ORAL 4 TIMES DAILY
Qty: 40 CAPSULE | Refills: 0 | Status: SHIPPED | OUTPATIENT
Start: 2021-09-10 | End: 2021-09-20

## 2021-09-10 RX ADMIN — AMLODIPINE BESYLATE 7.5 MG: 5 TABLET ORAL at 09:32

## 2021-09-10 RX ADMIN — Medication 50 MCG: at 09:32

## 2021-09-10 RX ADMIN — CLINDAMYCIN IN 5 PERCENT DEXTROSE 900 MG: 18 INJECTION, SOLUTION INTRAVENOUS at 02:09

## 2021-09-10 RX ADMIN — METOPROLOL SUCCINATE 25 MG: 25 TABLET, EXTENDED RELEASE ORAL at 09:33

## 2021-09-10 RX ADMIN — IBUPROFEN 200 MG: 200 TABLET, FILM COATED ORAL at 12:36

## 2021-09-10 RX ADMIN — POLYETHYLENE GLYCOL 3350 17 G: 17 POWDER, FOR SOLUTION ORAL at 09:32

## 2021-09-10 RX ADMIN — BUPROPION HYDROCHLORIDE 300 MG: 150 TABLET, EXTENDED RELEASE ORAL at 09:32

## 2021-09-10 RX ADMIN — CLINDAMYCIN HYDROCHLORIDE 300 MG: 300 CAPSULE ORAL at 12:36

## 2021-09-10 RX ADMIN — IBUPROFEN 200 MG: 200 TABLET, FILM COATED ORAL at 06:25

## 2021-09-10 RX ADMIN — ESCITALOPRAM OXALATE 20 MG: 20 TABLET ORAL at 09:33

## 2021-09-10 NOTE — PLAN OF CARE
Status: Pt admitted for L neck abscess  Vitals: VSS on RA   Neuros: Intact  IV: PIV SL  Labs/Electrolytes: WNL  Resp/trach: Denies SOB. LS clear  Diet: Reg diet, tolerating well  Bowel status: LBM 9/7  : Voiding  Skin: L neck swelling, erythema, and slightly firm to touch. Marked with no extension  Pain: Ibuprofen given x1 for neck pain  Activity: Independent  Plan: Continue to monitor observation goals  Updates this shift: Pt switched to observation status.     Observation goals:     Control of infection: Improving  Pain Control: met  Good PO intake: met

## 2021-09-10 NOTE — PROGRESS NOTES
Care Management Follow Up    Observation Status  Admission Date:  9-    Length of Stay (days): 1    Expected Discharge Date: 09/10/2021     Concerns to be Addressed:  YANEZ form     Patient plan of care discussed at interdisciplinary rounds: Yes    Anticipated Discharge Disposition:  Home     Anticipated Discharge Services:  None  Anticipated Discharge DME:  None    Additional Information:  In 6A Discharge Rounds Dr Vale reported pt is ready for discharge today.  Pt on Observation Status. Introduced myself to pt, she was alert, sitting up in bed, eating lunch. Reviewed YANEZ form with her. Pt signed YANEZ form, given a copy and original placed in chart.   Pt said her  will be providing transport home.        Yolanda Kwong RN Care Coordinator  Unit [address], [facility]

## 2021-09-10 NOTE — DISCHARGE SUMMARY
Discharge Summary  Donita Lerner  [id number]  1955    Date of Admission: 9/8/2021  Date of Discharge: 9/10/2021    Admission Diagnosis: Neck abscess [L02.11]  Discharge Diagnosis: Same    Procedures: None.    Pathology: Fine needle aspiration results pending.    HPI: Donita Lerner is a 65-year-old woman with history of hypertension who presented to ENT clinic with a recent history of sore throat, flu-like symptoms, and neck swelling. CT imaging showed a mass versus fluid collection in the suprahyoid left neck with surrounding fat stranding and edema. A lab workup was significant for a leukocytosis. Differential includes lymphadenitis, infected cystic metastasis from an unknown primary malignancy, or infected branchial cleft cyst. She did not have evidence of respiratory compromise or sepsis, but required admission for IV antibiotics. An FNA was performed in clinic on 9/8/21 prior to admission to the hospital.    Hospital Course: The patient was admitted to the hospital and was initiated on IV clindamycin therapy. She demonstrated substantial clinical improvement on antibiotics, with less pain and swelling, improved neck mobility, and improved erythema. Cultures from the clinic FNA showed 4+ Streptococcus pyogenes. She was transitioned to PO clindamycin after clinical improvement and down-trending infectious markers. She was discharged with appropriate clinical follow-up in 7 and 14 days with repeat CT imaging at 14 days. At discharge, the patient's pain was well controlled, she was voiding on her own, and she was ambulating and tolerating a regular diet. She will continue to take her existing clindamycin prescription. Pathology from the 9/8/2021 was still pending at discharge and results will be discussed at her clinic appointment.    Discharge Exam:  Vitals:    09/09/21 1500 09/09/21 2300 09/10/21 0748 09/10/21 0930   BP: 125/79 127/78 (!) 131/93 133/84   BP Location: Right arm Right arm Right arm    Pulse:  94 84 73 74   Resp: 16 16 16    Temp: 98.2  F (36.8  C) 98.2  F (36.8  C) 97.8  F (36.6  C)    TempSrc: Oral Oral Oral    SpO2: 96% 99% 95%      General: laying in bed, no acute distress  Head: normocephalic, atraumatic  Face: symmetrical, no swelling, edema, or erythema, no facial droop  Eyes: EOMI  Nose: no anterior drainage  Neck: left swelling along neck with overlying erythema spreading from mandible down toward clavicle, improved from outlines which were drawn 9/8 with reduced erythema, swelling, and tenderness to palpation. No fluctuance. Improved neck range of motion.  Respiratory: breathing non-labored, no stridor, no accessory muscle use.     Discharge Medications:   Danika Lerner   Home Medication Instructions NYA:[id number]    Printed on:09/10/21 1028     Medication Information                        amLODIPine (NORVASC) 5 MG tablet  Take 1.5 tablets (7.5 mg) by mouth daily             aspirin 81 MG tablet  Take 1 tablet (81 mg) by mouth daily             buPROPion (WELLBUTRIN XL) 300 MG 24 hr tablet  Take 1 tablet (300 mg) by mouth every morning DO NOT CRUSH.             Cholecalciferol (VITAMIN D) 2000 UNITS tablet  Take 2,000 Units by mouth daily             clindamycin (CLEOCIN) 300 MG capsule  Take 1 capsule (300 mg) by mouth 4 times daily for 10 days             escitalopram (LEXAPRO) 20 MG tablet  Take 1 tablet (20 mg) by mouth daily             metoprolol succinate ER (TOPROL-XL) 25 MG 24 hr tablet  Take 1 tablet (25 mg) by mouth daily             order for DME  Equipment being ordered: LILA compression stockings               Discharge Procedure Orders   Reason for your hospital stay   Order Comments: You were in the hospital for treatment of a left neck abscess.     Activity   Order Comments: Your activity upon discharge: return to regular activity as tolerated.     Order Specific Question Answer Comments   Is discharge order? Yes      When to contact your care team   Order Comments: Notify  your physician if you have increasing left neck pain, swelling, or other worsening symptoms. Please call the clinic with questions/concerns: [phone].    Otolaryngology/ENT Clinic:  [facility]  [address] [place] [id number]     Discharge Instructions   Order Comments: Continue taking your prescribed clindamycin antibiotic as previously directed. Take over-the-counter pain medications (e.g. Tylenol, ibuprofen) as needed for any neck discomfort.     Follow Up ([facility])   Order Comments: A follow-up appointment will be set up next week in the ENT clinic. You will be contacted by phone to make this appointment. Call [phone] if you haven't heard regarding these appointments within 7 days of discharge.     Diet   Order Comments: Follow this diet upon discharge: regular diet.     Order Specific Question Answer Comments   Is discharge order? Yes      Dispo: To home in good condition. All of the patient's questions/concerns have been addressed at this time.     Josephine Bahena MD PGY-4  Otolaryngology-Head & Neck Surgery  Please contact ENT by dialing [phone] and entering job code [phone].    I, Margarette Moore MD, saw and evaluated this patient prior to discharge. I discussed the patient with the resident and agree with plan of care as documented in the resident note. I personally spent 10 minutes on discharge activities.

## 2021-09-10 NOTE — PLAN OF CARE
Status: Pt on 6A for left neck abscess   Vitals: VSS on RA  Neuros: Intact  IV: No PIV   Diet: regular  Bowel status: BS+  : Voids without difficulty   Skin: L neck with erythema, swelling, slightly firm to touch.   Pain: prn ibuprofen given x1  Activity: Up ind   Social: updating family via phone   Plan: Discharge home at 1800. Discharge paperwork completed. Continue to monitor and follow POC.

## 2021-09-10 NOTE — PLAN OF CARE
Status: Pt admitted for L neck abscess, work-up being done after aspiration done in clinic   Vitals: VSS on RA  Neuros: Intact  IV: PIV SL between IV antx  Resp/trach: WNL  Diet: Regular  Bowel status: No BM overnight   : Voiding  Skin: L neck with erythema, swelling, and slightly firm to touch. Marked with no extension  Pain: Denied  Activity: Independent   Social: Slept well between cares  Plan: May transfer to oral antibiotics today, continue with POC

## 2021-09-10 NOTE — TELEPHONE ENCOUNTER
Patient is in need of a new prescription ASAP for clindamycin, patient was discharged but does not have a prescription. Pharmacy closing at 7pm.    Thank You!    Onofre Harp CPhT  [facility]

## 2021-09-10 NOTE — PROGRESS NOTES
Otolaryngology Progress Note    Subjective/Intvl events: NAEON, no extension of erythema noted, vss wnl, afeb tm 98.6. She states she is feeling better this morning and that her neck range of motion is improving.     O: /78 (BP Location: Right arm)   Pulse 84   Temp 98.2  F (36.8  C) (Oral)   Resp 16   LMP 07/01/2002   SpO2 99%   General: laying in bed, no acute distress  HEAD: normocephalic, atraumatic  Face: symmetrical, no swelling, edema, or erythema, no facial droop  Eyes: EOMI  Nose: no anterior drainage  Oropharynx: tonsils within normal limits, uvula midline, no oropharyngeal erythema  Neck: left swelling along neck with overlying erythema spreading from mandible down toward clavicle, not outside of the outlines which were drawn 9/8. Palpable mass beneath the sternocleidomastoid at the level of the hyoid, no fluctuance appreciated, less firm than prior. Improved erythema, improved neck ROM, less ttp. Less fullness to the neck.   Respiratory: Breathing non-labored, no stridor, no accessory muscle use.       LABS:   BMP  Recent Labs   Lab 09/10/21  0741 09/09/21  1454 09/09/21  0659 09/08/21  1906 09/07/21  1409 09/07/21  1230     --  137  --  134  --    POTASSIUM 3.4 3.6 3.0* 3.0* 3.7  --    CHLORIDE 108  --  107  --  102  --    RHETT 8.9  --  9.2  --  9.1  --    CO2 26  --  23  --  24  --    BUN 18  --  17  --  23  --    CR 0.76  --  0.86  --  1.03 1.1*   GLC 95  --  113*  --  110*  --      CBC  Recent Labs   Lab 09/10/21  0741 09/09/21  0659 09/07/21  1413   WBC 9.5 11.0 17.7*   RBC 4.48 4.62 4.89   HGB 13.1 13.8 14.5   HCT 39.8 42.6 43.0   MCV 89 92 88   MCH 29.2 29.9 29.7   MCHC 32.9 32.4 33.7   RDW 14.1 13.9 14.3    187 179     FNA: cultures and cytology pending    A/P: Donita Lerner is a 65 year old female with a past medical history of HTN who presents with a recent history of sore throat, flu-like symptoms, and 3 day history of neck swelling with CT imaging significant for a  mass vs fluid collection in the suprahyoid left neck with surrounding fat stranding and edema, as well as a lab workup significant for a leukocytosis. Differential includes infected cystic metastasis from an unknown primary malignancy, or infected branchial cleft cyst. At this time she is not having evidence of respiratory compromise or sepsis, but requires admission for IV antibiotics. FNA results from 9/8 are pending.     Neuro:  - Pain control: PRN tylenol, oxycodone, ibuprofen  - history of depression, daily wellbutrin     HEENT: neck abscess  - monitor erythema of skin overlying abscess. Patient care order placed to outline the erythema QShift to monitor for spreading.   - Erythema is improving.   - follow-up FNA results to evaluate for malignancy  - antibiotics as below     Respiratory: no acute issues, no current evidence of respiratory compromise  - supplemental O2 PRN to keep sats >92%  - continuous pulse ox     CV/heme: hx HTN  - PTA amlodipine, metoprolol  - daily CBC     FEN/GI: no acute issues, regular diet.   - Bowel regimen: scheduled miralax  - daily BMP. Mg and K     : no acute issues  - voiding independently     Endo: no acute issues     ID:   - Culture from FNA growing strep pyogenes.   - transition to PO clindamycin today 9/10  - trending inflammatory markers: CRP, WBC     PPX:  - SCDs  - IS    Dispo: Will likely discharge today, given clinical improvement. Will need follow up with ENT.     Ella Adam MD  Otolaryngology-Head & Neck Surgery PGY3  Please contact ENT with questions by dialing [phone] and entering job code [phone] when prompted.

## 2021-09-10 NOTE — UTILIZATION REVIEW
"Admission Status; Secondary Review Determination     Admission Date: 9/8/2021  4:03 PM       Under the authority of the Utilization Management Committee, the utilization review process indicated a secondary review on the above patient.  The review outcome is based on review of the medical records, discussions with staff, and applying clinical experience noted on the date of the review.          (x) Observation Status Appropriate - This patient does not meet hospital inpatient criteria and is placed in observation status. If this patient's primary payer is Medicare and was admitted as an inpatient, Condition Code 44 should be used and patient status changed to \"observation\".       RATIONALE FOR DETERMINATION      Brief clinical presentation, information copied from the chart, abbreviated and edited for relevant content:     Donita Lerner is a 65 year old female with a past medical history of HTN who presents with a recent history of sore throat, flu-like symptoms, and 3 day history of neck swelling with CT imaging significant for a mass vs fluid collection in the suprahyoid left neck with surrounding fat stranding and edema, as well as a lab workup significant for a leukocytosis. Differential includes infected cystic metastasis from an unknown primary malignancy, or infected branchial cleft cyst. Admitted to OBS on IV abx. Improved. Likely to discharge today after transition to oral abx. CRP down form 140- 78.     Patient is clinically improving and there is no clear indication to change the patient's status to inpatient. The severity of illness, intensity of cares provided, risk for adverse outcome, and expected LOS make the care appropriate for observation.       The information on this document is developed by the utilization review team in order for the business office to ensure compliance.  This only denotes the appropriateness of proper admission status and does not reflect the quality of care rendered.       "   The definitions of Inpatient Status and Observation Status used in making the determination above are those provided in the CMS Coverage Manual, Chapter 1 and Chapter 6, section 70.4.      Sincerely,     Yolanda Young MD   Utilization Review/ Case Management  [facility].

## 2021-09-14 DIAGNOSIS — I10 ESSENTIAL HYPERTENSION, BENIGN: ICD-10-CM

## 2021-09-14 LAB — SCANNED LAB RESULT: NORMAL

## 2021-09-14 RX ORDER — METOPROLOL SUCCINATE 25 MG/1
TABLET, EXTENDED RELEASE ORAL
Qty: 90 TABLET | Refills: 3 | Status: SHIPPED | OUTPATIENT
Start: 2021-09-14 | End: 2022-09-30

## 2021-09-15 ENCOUNTER — OFFICE VISIT (OUTPATIENT)
Dept: OTOLARYNGOLOGY | Facility: CLINIC | Age: 66
End: 2021-09-15
Payer: COMMERCIAL

## 2021-09-15 VITALS
HEART RATE: 85 BPM | BODY MASS INDEX: 37.98 KG/M2 | OXYGEN SATURATION: 100 % | SYSTOLIC BLOOD PRESSURE: 131 MMHG | TEMPERATURE: 97.4 F | WEIGHT: 242 LBS | DIASTOLIC BLOOD PRESSURE: 83 MMHG | HEIGHT: 67 IN

## 2021-09-15 DIAGNOSIS — H61.23 BILATERAL IMPACTED CERUMEN: ICD-10-CM

## 2021-09-15 DIAGNOSIS — R22.1 NECK MASS: Primary | ICD-10-CM

## 2021-09-15 LAB — BACTERIA FLD CULT: NORMAL

## 2021-09-15 PROCEDURE — 99213 OFFICE O/P EST LOW 20 MIN: CPT | Mod: 25 | Performed by: REGISTERED NURSE

## 2021-09-15 PROCEDURE — 92504 EAR MICROSCOPY EXAMINATION: CPT | Performed by: REGISTERED NURSE

## 2021-09-15 ASSESSMENT — MIFFLIN-ST. JEOR: SCORE: 1675.33

## 2021-09-15 ASSESSMENT — PAIN SCALES - GENERAL: PAINLEVEL: NO PAIN (0)

## 2021-09-15 NOTE — NURSING NOTE
"Chief Complaint   Patient presents with     RECHECK     1 week follow up       Blood pressure 131/83, pulse 85, temperature 97.4  F (36.3  C), temperature source Temporal, height 1.702 m (5' 7\"), weight 109.8 kg (242 lb), last menstrual period 07/01/2002, SpO2 100 %, not currently breastfeeding.    Michelle Siddiqui, EMT    "

## 2021-09-15 NOTE — PROGRESS NOTES
September 15, 2021    Prior Medical History: Donita Lerner is a 65 year old female with a history of acute onset of a left enlarging neck mass in the setting of a sore throat and flu-like symptoms. CT was performed demonstrating a hypodense mass/collection in the suprahyoid left neck measuring 2.8 x 2.4 x 4.1 cm with surrounding inflammatory fat  stranding, edema and lymphadenopathy. Patient was started on oral clindamycin (anaphylactic allergy to penicillin) on 9/7. She was seen in clinic on 9/8 for FNA and was found to have increased swelling and erythema. Patient was also slightly hypotensive. Patient was admitted to hospital for IV antibiotics and hydration. Cultures from FNA demonstrated group A strep. Negative for malignancy.  Patient improved with IV antibiotics and was discharged home on oral clindamycin on 9/10.     Interval History:   Patient comes in today for post hospital follow up. She states that she is doing very well and is feeling better every day. Her sore throat has resolved. She continues to have tenderness at site but has had significant improvement with range of motion of neck. Denies any shortness of breath, dizziness, nausea, or fever/chills. She states that she is relieved that the FNA was negative for malignancy.    Past Medical History:  Past Medical History:   Diagnosis Date     Arthritis      Depressive disorder, not elsewhere classified      Generalized anxiety disorder      Hypertension     No cardiologist     Meniere's disease 5/17/2005     Problem list name updated by automated process. Provider to review     Meniere's disease, unspecified      Sensorineural hearing loss, unspecified     left ear since birth complete     Unspecified episodic mood disorder     depression worsens winter SADD     Past Surgical History:  Past Surgical History:   Procedure Laterality Date     ARTHROPLASTY KNEE  6/17/2013    Procedure: ARTHROPLASTY KNEE;  Right Total Knee Arthroplasty  ;  Surgeon:  Ganesh Sheikh MD;  Location: [facility] OR     ARTHROPLASTY KNEE UNICOMPARTMENT Left 2016    Procedure: ARTHROPLASTY KNEE UNICOMPARTMENT;  Surgeon: Ganesh Sheikh MD;  Location: [facility] OR     C ANESTH, SECTION      x3     LAPAROSCOPIC CHOLECYSTECTOMY WITH CHOLANGIOGRAMS N/A 2018    Procedure: LAPAROSCOPIC CHOLECYSTECTOMY WITH CHOLANGIOGRAMS;  Laparoscopic Cholecystectomy with Intraoperative Cholangiogram;  Surgeon: Erick Rivera MD;  Location: [facility] OR     Medications:  Current Outpatient Medications   Medication Sig Dispense Refill     amLODIPine (NORVASC) 5 MG tablet Take 1.5 tablets (7.5 mg) by mouth daily 135 tablet 1     aspirin 81 MG tablet Take 1 tablet (81 mg) by mouth daily 90 tablet 3     buPROPion (WELLBUTRIN XL) 300 MG 24 hr tablet Take 1 tablet (300 mg) by mouth every morning DO NOT CRUSH. 90 tablet 1     Cholecalciferol (VITAMIN D) 2000 UNITS tablet Take 2,000 Units by mouth daily 100 tablet 3     clindamycin (CLEOCIN) 300 MG capsule Take 1 capsule (300 mg) by mouth 4 times daily for 10 days 40 capsule 0     clindamycin (CLEOCIN) 300 MG capsule Take 1 capsule (300 mg) by mouth 4 times daily for 10 days 40 capsule 0     escitalopram (LEXAPRO) 20 MG tablet Take 1 tablet (20 mg) by mouth daily 90 tablet 1     metoprolol succinate ER (TOPROL-XL) 25 MG 24 hr tablet TAKE 1 TABLET DAILY 90 tablet 3     order for DME Equipment being ordered: LILA compression stockings 1 Units 0     Allergies:  Allergies   Allergen Reactions     Penicillins Anaphylaxis and Swelling     Lisinopril Cough     Sulfa Drugs Rash      Social History:  Social History     Tobacco Use     Smoking status: Never Smoker     Smokeless tobacco: Never Used   Substance Use Topics     Alcohol use: No     Drug use: No     ROS: 10 point ROS neg other than the symptoms noted above in the HPI.    Physical Exam:    /83 (BP Location: Right arm, Patient Position: Sitting, Cuff Size: Adult Regular)   Pulse 85    "Temp 97.4  F (36.3  C) (Temporal)   Ht 1.702 m (5' 7\")   Wt 109.8 kg (242 lb)   LMP 07/01/2002   SpO2 100%   BMI 37.90 kg/m    Wt Readings from Last 3 Encounters:   09/15/21 109.8 kg (242 lb)   09/08/21 110 kg (242 lb 8.1 oz)   03/15/21 108.4 kg (239 lb)        Constitutional:  The patient was unaccompanied, well-groomed, and in no acute distress.     Neurologic: Alert and oriented x 3.     Psychiatric: The patient's affect was calm, cooperative, and appropriate.     Communication:  Normal; communicates verbally, normal voice quality.    Respiratory: Breathing comfortably without stridor or exertion of accessory muscles.    Ears: Pinnae and tragus non-tender. Impacted cerumen bilaterally.   Oral Cavity: Normal tongue, floor of mouth, buccal mucosa, and palate.  No lesions or masses on inspection or palpation.     Oropharynx: Normal mucosa, palate symmetric with normal elevation. No abnormal lymph tissue in the oropharynx.    Neck: Firm left level IIb mass measuring about 5 x 3 cm. Tenderness with palpation of superior portion of mass. Slightly reddened over mass, no redness on skin. It is not warm to touch.    Lymphatic: There is no other palpable lymphadenopathy in the neck.      Otologic microscope exam:   Patient's ear pathology required use of the binocular microscope for the purpose of cleaning and improving visualization in order to assure a more accurate diagnostic evaluation.     Right ear was examined under the microscope.  Complete cerumen impaction.  It was cleaned with right angle hook, suction and alligator forceps. Normal appearing TM, nicely aerated middle ear space.    Left ear was also examined under the microscope. Complete cerumen impaction.  It was cleaned with right angle hook, suction and alligator forceps. Normal appearing TM, nicely aerated middle ear space.       Results Reviewed:  9/8/21  Final Diagnosis   Specimen A: Neck, Left, Fine Needle Aspirate                 Interpretation: "                  Negative for malignancy                 Other Findings:                  Acute inflammation and abundant bacteria present                 Adequacy:                 Satisfactory for evaluation     Culture 4+ Streptococcus pyogenes (Group A Streptococcus)Abnormal     This organism is susceptible to ampicillin, penicillin, vancomycin and the cephalosporins. If treatment is required and your patient is allergic to penicillin, contact the microbiology lab within 5 days to request susceptibility testing.        Resulting Agency: [facility]           Specimen Collected: 09/08/21 12:04 PM Last Resulted: 09/10/21  9:50 AM        Assessment/Plan:  1. Neck mass  Patient with improving cystic neck mass. Mass is still visible and firm with palpation. Patient will continue oral antibiotics until 9/20/21. Discussed with patient that, although the initial FNA was negative for malignancy, she may need a repeat FNA once infection has resolved.  She is scheduled for follow up next week with CT scan. Will have Dr. Flores also assess patient during this follow up visit.     2. Bilateral impacted cerumen  Patient's ears were cleaned today under microscopy. 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 Continue to monitor cerumen impaction and return for cleaning as needed.     Julia Steiner DNP, APRN, CNP  Otolaryngology  Head & Neck Surgery  [phone]    30 minutes spent on the date of the encounter doing chart review, history and exam, documentation and further activities per the note

## 2021-09-15 NOTE — LETTER
9/15/2021       RE: Donita Lerner  [id number] [facility] [address]     Dear Colleague,    Thank you for referring your patient, Donita Lerner, to the [facility] [place] at [facility]. Please see a copy of my visit note below.    September 15, 2021    Prior Medical History: Donita Lerner is a 65 year old female with a history of acute onset of a left enlarging neck mass in the setting of a sore throat and flu-like symptoms. CT was performed demonstrating a hypodense mass/collection in the suprahyoid left neck measuring 2.8 x 2.4 x 4.1 cm with surrounding inflammatory fat  stranding, edema and lymphadenopathy. Patient was started on oral clindamycin (anaphylactic allergy to penicillin) on 9/7. She was seen in clinic on 9/8 for FNA and was found to have increased swelling and erythema. Patient was also slightly hypotensive. Patient was admitted to hospital for IV antibiotics and hydration. Cultures from FNA demonstrated group A strep. Negative for malignancy.  Patient improved with IV antibiotics and was discharged home on oral clindamycin on 9/10.     Interval History:   Patient comes in today for post hospital follow up. She states that she is doing very well and is feeling better every day. Her sore throat has resolved. She continues to have tenderness at site but has had significant improvement with range of motion of neck. Denies any shortness of breath, dizziness, nausea, or fever/chills. She states that she is relieved that the FNA was negative for malignancy.    Past Medical History:  Past Medical History:   Diagnosis Date     Arthritis      Depressive disorder, not elsewhere classified      Generalized anxiety disorder      Hypertension     No cardiologist     Meniere's disease 5/17/2005     Problem list name updated by automated process. Provider to review     Meniere's disease, unspecified      Sensorineural  hearing loss, unspecified     left ear since birth complete     Unspecified episodic mood disorder     depression worsens winter SADD     Past Surgical History:  Past Surgical History:   Procedure Laterality Date     ARTHROPLASTY KNEE  2013    Procedure: ARTHROPLASTY KNEE;  Right Total Knee Arthroplasty  ;  Surgeon: Ganesh Sheikh MD;  Location: [facility] OR     ARTHROPLASTY KNEE UNICOMPARTMENT Left 2016    Procedure: ARTHROPLASTY KNEE UNICOMPARTMENT;  Surgeon: Ganesh Sheikh MD;  Location: [facility] OR     C ANESTH, SECTION      x3     LAPAROSCOPIC CHOLECYSTECTOMY WITH CHOLANGIOGRAMS N/A 2018    Procedure: LAPAROSCOPIC CHOLECYSTECTOMY WITH CHOLANGIOGRAMS;  Laparoscopic Cholecystectomy with Intraoperative Cholangiogram;  Surgeon: Erick Rivera MD;  Location: [facility] OR     Medications:  Current Outpatient Medications   Medication Sig Dispense Refill     amLODIPine (NORVASC) 5 MG tablet Take 1.5 tablets (7.5 mg) by mouth daily 135 tablet 1     aspirin 81 MG tablet Take 1 tablet (81 mg) by mouth daily 90 tablet 3     buPROPion (WELLBUTRIN XL) 300 MG 24 hr tablet Take 1 tablet (300 mg) by mouth every morning DO NOT CRUSH. 90 tablet 1     Cholecalciferol (VITAMIN D) 2000 UNITS tablet Take 2,000 Units by mouth daily 100 tablet 3     clindamycin (CLEOCIN) 300 MG capsule Take 1 capsule (300 mg) by mouth 4 times daily for 10 days 40 capsule 0     clindamycin (CLEOCIN) 300 MG capsule Take 1 capsule (300 mg) by mouth 4 times daily for 10 days 40 capsule 0     escitalopram (LEXAPRO) 20 MG tablet Take 1 tablet (20 mg) by mouth daily 90 tablet 1     metoprolol succinate ER (TOPROL-XL) 25 MG 24 hr tablet TAKE 1 TABLET DAILY 90 tablet 3     order for DME Equipment being ordered: LILA compression stockings 1 Units 0     Allergies:  Allergies   Allergen Reactions     Penicillins Anaphylaxis and Swelling     Lisinopril Cough     Sulfa Drugs Rash      Social History:  Social History     Tobacco Use      "Smoking status: Never Smoker     Smokeless tobacco: Never Used   Substance Use Topics     Alcohol use: No     Drug use: No     ROS: 10 point ROS neg other than the symptoms noted above in the HPI.    Physical Exam:    /83 (BP Location: Right arm, Patient Position: Sitting, Cuff Size: Adult Regular)   Pulse 85   Temp 97.4  F (36.3  C) (Temporal)   Ht 1.702 m (5' 7\")   Wt 109.8 kg (242 lb)   LMP 07/01/2002   SpO2 100%   BMI 37.90 kg/m    Wt Readings from Last 3 Encounters:   09/15/21 109.8 kg (242 lb)   09/08/21 110 kg (242 lb 8.1 oz)   03/15/21 108.4 kg (239 lb)        Constitutional:  The patient was unaccompanied, well-groomed, and in no acute distress.     Neurologic: Alert and oriented x 3.     Psychiatric: The patient's affect was calm, cooperative, and appropriate.     Communication:  Normal; communicates verbally, normal voice quality.    Respiratory: Breathing comfortably without stridor or exertion of accessory muscles.    Ears: Pinnae and tragus non-tender. Impacted cerumen bilaterally.   Oral Cavity: Normal tongue, floor of mouth, buccal mucosa, and palate.  No lesions or masses on inspection or palpation.     Oropharynx: Normal mucosa, palate symmetric with normal elevation. No abnormal lymph tissue in the oropharynx.    Neck: Firm left level IIb mass measuring about 5 x 3 cm. Tenderness with palpation of superior portion of mass. Slightly reddened over mass, no redness on skin. It is not warm to touch.    Lymphatic: There is no other palpable lymphadenopathy in the neck.      Otologic microscope exam:   Patient's ear pathology required use of the binocular microscope for the purpose of cleaning and improving visualization in order to assure a more accurate diagnostic evaluation.     Right ear was examined under the microscope.  Complete cerumen impaction.  It was cleaned with right angle hook, suction and alligator forceps. Normal appearing TM, nicely aerated middle ear space.    Left ear " was also examined under the microscope. Complete cerumen impaction.  It was cleaned with right angle hook, suction and alligator forceps. Normal appearing TM, nicely aerated middle ear space.       Results Reviewed:  9/8/21  Final Diagnosis   Specimen A: Neck, Left, Fine Needle Aspirate                 Interpretation:                  Negative for malignancy                 Other Findings:                  Acute inflammation and abundant bacteria present                 Adequacy:                 Satisfactory for evaluation     Culture 4+ Streptococcus pyogenes (Group A Streptococcus)Abnormal     This organism is susceptible to ampicillin, penicillin, vancomycin and the cephalosporins. If treatment is required and your patient is allergic to penicillin, contact the microbiology lab within 5 days to request susceptibility testing.        Resulting Agency: [facility]           Specimen Collected: 09/08/21 12:04 PM Last Resulted: 09/10/21  9:50 AM        Assessment/Plan:  1. Neck mass  Patient with improving cystic neck mass. Mass is still visible and firm with palpation. Patient will continue oral antibiotics until 9/20/21. Discussed with patient that, although the initial FNA was negative for malignancy, she may need a repeat FNA once infection has resolved.  She is scheduled for follow up next week with CT scan. Will have Dr. Flores also assess patient during this follow up visit.     2. Bilateral impacted cerumen  Patient's ears were cleaned today under microscopy. 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 Continue to monitor cerumen impaction and return for cleaning as needed.     Julia Steiner DNP, APRN, CNP  Otolaryngology  Head & Neck Surgery  [phone]    30 minutes spent on the date of the encounter doing chart review, history and exam, documentation and further activities per the note

## 2021-09-18 ENCOUNTER — HEALTH MAINTENANCE LETTER (OUTPATIENT)
Age: 66
End: 2021-09-18

## 2021-09-22 ENCOUNTER — OFFICE VISIT (OUTPATIENT)
Dept: OTOLARYNGOLOGY | Facility: CLINIC | Age: 66
End: 2021-09-22
Payer: COMMERCIAL

## 2021-09-22 ENCOUNTER — ANCILLARY PROCEDURE (OUTPATIENT)
Dept: CT IMAGING | Facility: CLINIC | Age: 66
End: 2021-09-22
Attending: REGISTERED NURSE
Payer: COMMERCIAL

## 2021-09-22 VITALS — WEIGHT: 246.91 LBS | BODY MASS INDEX: 38.67 KG/M2

## 2021-09-22 DIAGNOSIS — E04.1 THYROID NODULE: Primary | ICD-10-CM

## 2021-09-22 DIAGNOSIS — R22.1 MASS OF LEFT SIDE OF NECK: ICD-10-CM

## 2021-09-22 DIAGNOSIS — L02.11 ABSCESS OF NECK: ICD-10-CM

## 2021-09-22 PROCEDURE — 70491 CT SOFT TISSUE NECK W/DYE: CPT | Mod: GC | Performed by: RADIOLOGY

## 2021-09-22 PROCEDURE — 99204 OFFICE O/P NEW MOD 45 MIN: CPT | Performed by: OTOLARYNGOLOGY

## 2021-09-22 RX ORDER — IOPAMIDOL 755 MG/ML
100 INJECTION, SOLUTION INTRAVASCULAR ONCE
Status: COMPLETED | OUTPATIENT
Start: 2021-09-22 | End: 2021-09-22

## 2021-09-22 RX ADMIN — IOPAMIDOL 100 ML: 755 INJECTION, SOLUTION INTRAVASCULAR at 08:46

## 2021-09-22 NOTE — PROGRESS NOTES
Dear Dr. Moore:    I had the pleasure of meeting Donita Lerner in consultation today at the [facility] at your request.     History of Present Illness:   Donita Lerner is a 65-year-old woman with a cystic neck mass.  The patient was seen in clinic on 9/8/2021 with a left-sided cystic neck mass that has been present for about 3 days with the preceding sore throat.  She was seen by her primary care physician and had a rapid strep test that was negative.  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  She was seen in clinic by the PA 9/8/2021.  At that time she had increase in size of the mass and overlying skin erythema with hypotension.  A needle biopsy was performed in the clinic at that time which showed inflammatory cells and bacteria.  Of note thyroglobulin was sent but not completed.  She did have flexible fiberoptic laryngoscopy performed at that time without obvious lesion in the tonsil or base of tongue.  She was admitted to the hospital and treated with a course of antibiotics.  She was seen in clinic in follow-up by the PA on 9/15/2021 and was improved with a persistent neck mass.  She comes in today with repeat imaging.  This demonstrated a 1.7 x 1.7 cm cystic left neck lesion with rim enhancement along with some prominent lymph nodes.  She says today that she is feeling improved.  She is aware of the residual mass present in the neck.      Past medical history: Ménière's disease, anxiety, depression, hypertension    Past surgical history:Bilateral knee arthroplasty, cholecystectomy    Social history: Non-smoker.  No history of alcohol.     Family history: Mother history of lymphoma    MEDICATIONS:     Current Outpatient Medications   Medication Sig Dispense Refill     amLODIPine (NORVASC) 5 MG tablet Take 1.5 tablets (7.5 mg) by  mouth daily 135 tablet 1     aspirin 81 MG tablet Take 1 tablet (81 mg) by mouth daily 90 tablet 3     buPROPion (WELLBUTRIN XL) 300 MG 24 hr tablet Take 1 tablet (300 mg) by mouth every morning DO NOT CRUSH. 90 tablet 1     Cholecalciferol (VITAMIN D) 2000 UNITS tablet Take 2,000 Units by mouth daily 100 tablet 3     escitalopram (LEXAPRO) 20 MG tablet Take 1 tablet (20 mg) by mouth daily 90 tablet 1     metoprolol succinate ER (TOPROL-XL) 25 MG 24 hr tablet TAKE 1 TABLET DAILY 90 tablet 3     order for DME Equipment being ordered: LILA compression stockings 1 Units 0       ALLERGIES:    Allergies   Allergen Reactions     Penicillins Anaphylaxis and Swelling     Lisinopril Cough     Sulfa Drugs Rash       HABITS/SOCIAL HISTORY:   Non-smoker.  No history of alcohol.      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     Employer: [employer]   Tobacco Use     Smoking status: Never Smoker     Smokeless tobacco: Never Used   Substance and Sexual Activity     Alcohol use: No     Drug use: No     Sexual activity: Yes     Partners: Male     Birth control/protection: Post-menopausal   Other Topics Concern     Parent/sibling w/ CABG, MI or angioplasty before 65F 55M? Not Asked   Social History Narrative     Not on file     Social Determinants of Health     Financial Resource Strain:      Difficulty of Paying Living Expenses:    Food Insecurity:      Worried About Running Out of Food in the Last Year:      Ran Out of Food in the Last Year:    Transportation Needs:      Lack of Transportation (Medical):      Lack of Transportation (Non-Medical):    Physical Activity:      Days of Exercise per Week:      Minutes of Exercise per  Session:    Stress:      Feeling of Stress :    Social Connections:      Frequency of Communication with Friends and Family:      Frequency of Social Gatherings with Friends and Family:      Attends Confucianist Services:      Active Member of Clubs or Organizations:      Attends Club or Organization Meetings:      Marital Status:    Intimate Partner Violence:      Fear of Current or Ex-Partner:      Emotionally Abused:      Physically Abused:      Sexually Abused:        PAST MEDICAL HISTORY:   Past Medical History:   Diagnosis Date     Arthritis      Depressive disorder, not elsewhere classified      Generalized anxiety disorder      Hypertension     No cardiologist     Meniere's disease 2005     Problem list name updated by automated process. Provider to review     Meniere's disease, unspecified      Sensorineural hearing loss, unspecified     left ear since birth complete     Unspecified episodic mood disorder     depression worsens winter SADD        PAST SURGICAL HISTORY:   Past Surgical History:   Procedure Laterality Date     ADENOIDECTOMY  1967     ARTHROPLASTY KNEE  2013    Procedure: ARTHROPLASTY KNEE;  Right Total Knee Arthroplasty  ;  Surgeon: Ganesh Sheikh MD;  Location: [facility] OR     ARTHROPLASTY KNEE UNICOMPARTMENT Left 2016    Procedure: ARTHROPLASTY KNEE UNICOMPARTMENT;  Surgeon: Ganesh Sheikh MD;  Location: [facility] OR     C ANESTH, SECTION      x3     LAPAROSCOPIC CHOLECYSTECTOMY WITH CHOLANGIOGRAMS N/A 2018    Procedure: LAPAROSCOPIC CHOLECYSTECTOMY WITH CHOLANGIOGRAMS;  Laparoscopic Cholecystectomy with Intraoperative Cholangiogram;  Surgeon: Erick Rivera MD;  Location: [facility] OR     TONSILLECTOMY  1967       FAMILY HISTORY:    Family History   Problem Relation Age of Onset     Cancer Mother         Lymphoma     Depression Mother      Cancer Father         Carcinoma Mesothelioma ([place])     Psychotic Disorder Daughter         borderline personality  disorder     Depression Brother        REVIEW OF SYSTEMS:  12 point ROS was negative other than the symptoms noted above in the HPI.  Patient Supplied Answers to Review of Systems  [facility] ENT ROS 9/14/2021   Constitutional -   Neurology -   Ears, Nose, Throat Sore throat   Cardiopulmonary -   Gastrointestinal/Genitourinary Unexplained nausea/vomiting   Musculoskeletal Neck pain   Hematologic Lymph node swelling         PHYSICAL EXAMINATION:   Wt 112 kg (246 lb 14.6 oz)   LMP 07/01/2002   BMI 38.67 kg/m    Appearance:   normal; NAD, age-appropriate appearance, well-developed, obese   Communication:   normal; communicates verbally, normal voice quality   Head/Face:   inspection -  Normal; no scars or visible lesions   Facial strength -  Normal and symmetric    Skin:  normal, no rash   Ears:  auricle (AD) -  normal  auricle (AS) -  normal  Normal clinical speech reception   Nose:  Ext. inspection -  Normal   Neck: Some left neck visible fullness but decreased from previous, no overlying skin changes, no tenderness  Palpable firmness in left neck but orders poorly defined   Cardiovascular:  warm, pink, well-perfused extremities without swelling, tenderness, or edema   Respiratory:  Normal respiratory effort, no stridor   Neuro/Psych.:  mood/affect -  normal  mental status -  normal         PROCEDURES:   RESULTS REVIEWED:   I independently reviewed the CT scan images    I reviewed the notes from the previous clinic visits      IMPRESSION AND PLAN:   Donita Lerner is a 65-year-old woman who was initially referred for evaluation of a neck mass.  She did present with acute infectious type picture.  She was treated with a course of antibiotics.  She does have a residual area in the left neck with room enhancement that does appear to be consistent with ongoing infectious changes.  I discussed with her that the differential can include both benign and malignant etiologies.  She has no previous history of neck swelling with upper  respiratory illnesses.  We discussed that while brachial cleft cyst is certainly possible in adults of her age, there are other etiologies that need to be ruled out to ensure that appropriate management is pursued.  Specifically we spent time discussing that a metastatic squamous cell carcinoma of the oropharynx or a cystic metastasis from a thyroid cancer are both on the differential.  She did have a previous FNA which showed infectious/inflammatory changes.  I would like to repeat this biopsy under image guidance.  We will send the FNA material for cytology but also for p16 washings and thyroglobulin just to ensure that there are no other concerning findings that would change her management.  She did ask about lymphoma given her mother's history and we discussed that typically lymphoma does not present as a cystic neck mass.  There does appear to be a nodule in her thyroid on the CT scan and I would like to get an ultrasound of the thyroid test to further evaluate this.  She notes the symptoms to watch for if she has recurrent signs of infection she should notify us.  We will determine her follow-up plan once she has the needle biopsy performed and the results are finalized.      Thank you very much for the opportunity to participate in the care of your patient.      Radha Flores MD, M.D.  Otolaryngology- Head & Neck Surgery      This note was dictated with voice recognition software and then edited. Please excuse any unintentional errors.

## 2021-09-22 NOTE — NURSING NOTE
Chief Complaint   Patient presents with     RECHECK     follow up after cT     Weight 112 kg (246 lb 14.6 oz), last menstrual period 07/01/2002, not currently breastfeeding.    Santosh Duncan LPN

## 2021-09-22 NOTE — LETTER
9/22/2021       RE: Donita Lerner  [address] [facility] [address]     Dear Colleague,    Thank you for referring your patient, Donita Lerner, to the [facility] [place] [facility]. Please see a copy of my visit note below.    Dear Dr. Moore:    I had the pleasure of meeting Donita Lerner in consultation today at the [facility] at your request.     History of Present Illness:   Donita Lerner is a 65-year-old woman with a cystic neck mass.  The patient was seen in clinic on 9/8/2021 with a left-sided cystic neck mass that has been present for about 3 days with the preceding sore throat.  She was seen by her primary care physician and had a rapid strep test that was negative.  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  She was seen in clinic by the PA 9/8/2021.  At that time she had increase in size of the mass and overlying skin erythema with hypotension.  A needle biopsy was performed in the clinic at that time which showed inflammatory cells and bacteria.  Of note thyroglobulin was sent but not completed.  She did have flexible fiberoptic laryngoscopy performed at that time without obvious lesion in the tonsil or base of tongue.  She was admitted to the hospital and treated with a course of antibiotics.  She was seen in clinic in follow-up by the PA on 9/15/2021 and was improved with a persistent neck mass.  She comes in today with repeat imaging.  This demonstrated a 1.7 x 1.7 cm cystic left neck lesion with rim enhancement along with some prominent lymph nodes.  She says today that she is feeling improved.  She is aware of the residual mass present in the neck.      Past medical history: Ménière's disease, anxiety, depression,  hypertension    Past surgical history:Bilateral knee arthroplasty, cholecystectomy    Social history: Non-smoker.  No history of alcohol.     Family history: Mother history of lymphoma    MEDICATIONS:     Current Outpatient Medications   Medication Sig Dispense Refill     amLODIPine (NORVASC) 5 MG tablet Take 1.5 tablets (7.5 mg) by mouth daily 135 tablet 1     aspirin 81 MG tablet Take 1 tablet (81 mg) by mouth daily 90 tablet 3     buPROPion (WELLBUTRIN XL) 300 MG 24 hr tablet Take 1 tablet (300 mg) by mouth every morning DO NOT CRUSH. 90 tablet 1     Cholecalciferol (VITAMIN D) 2000 UNITS tablet Take 2,000 Units by mouth daily 100 tablet 3     escitalopram (LEXAPRO) 20 MG tablet Take 1 tablet (20 mg) by mouth daily 90 tablet 1     metoprolol succinate ER (TOPROL-XL) 25 MG 24 hr tablet TAKE 1 TABLET DAILY 90 tablet 3     order for DME Equipment being ordered: LILA compression stockings 1 Units 0       ALLERGIES:    Allergies   Allergen Reactions     Penicillins Anaphylaxis and Swelling     Lisinopril Cough     Sulfa Drugs Rash       HABITS/SOCIAL HISTORY:   Non-smoker.  No history of alcohol.      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     Employer: [employer]   Tobacco Use     Smoking status: Never Smoker     Smokeless tobacco: Never Used   Substance and Sexual Activity     Alcohol use: No     Drug use: No     Sexual activity: Yes     Partners: Male     Birth control/protection: Post-menopausal   Other Topics Concern     Parent/sibling w/ CABG, MI or angioplasty before 65F 55M? Not Asked   Social History Narrative     Not on file     Social Determinants of Health     Financial Resource Strain:       Difficulty of Paying Living Expenses:    Food Insecurity:      Worried About Running Out of Food in the Last Year:      Ran Out of Food in the Last Year:    Transportation Needs:      Lack of Transportation (Medical):      Lack of Transportation (Non-Medical):    Physical Activity:      Days of Exercise per Week:      Minutes of Exercise per Session:    Stress:      Feeling of Stress :    Social Connections:      Frequency of Communication with Friends and Family:      Frequency of Social Gatherings with Friends and Family:      Attends [employer] Services:      Active Member of Clubs or Organizations:      Attends Club or Organization Meetings:      Marital Status:    Intimate Partner Violence:      Fear of Current or Ex-Partner:      Emotionally Abused:      Physically Abused:      Sexually Abused:        PAST MEDICAL HISTORY:   Past Medical History:   Diagnosis Date     Arthritis      Depressive disorder, not elsewhere classified      Generalized anxiety disorder      Hypertension     No cardiologist     Meniere's disease 2005     Problem list name updated by automated process. Provider to review     Meniere's disease, unspecified      Sensorineural hearing loss, unspecified     left ear since birth complete     Unspecified episodic mood disorder     depression worsens winter SADD        PAST SURGICAL HISTORY:   Past Surgical History:   Procedure Laterality Date     ADENOIDECTOMY  1967     ARTHROPLASTY KNEE  2013    Procedure: ARTHROPLASTY KNEE;  Right Total Knee Arthroplasty  ;  Surgeon: Ganesh Sheikh MD;  Location:  OR     ARTHROPLASTY KNEE UNICOMPARTMENT Left 2016    Procedure: ARTHROPLASTY KNEE UNICOMPARTMENT;  Surgeon: Ganesh Sheikh MD;  Location:  OR     C ANESTH, SECTION      x3     LAPAROSCOPIC CHOLECYSTECTOMY WITH CHOLANGIOGRAMS N/A 2018    Procedure: LAPAROSCOPIC CHOLECYSTECTOMY WITH CHOLANGIOGRAMS;  Laparoscopic Cholecystectomy with Intraoperative  Cholangiogram;  Surgeon: Erick Rivera MD;  Location: [place] OR     TONSILLECTOMY  1967       FAMILY HISTORY:    Family History   Problem Relation Age of Onset     Cancer Mother         Lymphoma     Depression Mother      Cancer Father         Carcinoma Mesothelioma ([place])     Psychotic Disorder Daughter         borderline personality disorder     Depression Brother        REVIEW OF SYSTEMS:  12 point ROS was negative other than the symptoms noted above in the HPI.  Patient Supplied Answers to Review of Systems  [facility] ENT ROS 9/14/2021   Constitutional -   Neurology -   Ears, Nose, Throat Sore throat   Cardiopulmonary -   Gastrointestinal/Genitourinary Unexplained nausea/vomiting   Musculoskeletal Neck pain   Hematologic Lymph node swelling         PHYSICAL EXAMINATION:   Wt 112 kg (246 lb 14.6 oz)   LMP 07/01/2002   BMI 38.67 kg/m    Appearance:   normal; NAD, age-appropriate appearance, well-developed, obese   Communication:   normal; communicates verbally, normal voice quality   Head/Face:   inspection -  Normal; no scars or visible lesions   Facial strength -  Normal and symmetric    Skin:  normal, no rash   Ears:  auricle (AD) -  normal  auricle (AS) -  normal  Normal clinical speech reception   Nose:  Ext. inspection -  Normal   Neck: Some left neck visible fullness but decreased from previous, no overlying skin changes, no tenderness  Palpable firmness in left neck but orders poorly defined   Cardiovascular:  warm, pink, well-perfused extremities without swelling, tenderness, or edema   Respiratory:  Normal respiratory effort, no stridor   Neuro/Psych.:  mood/affect -  normal  mental status -  normal         PROCEDURES:   RESULTS REVIEWED:   I independently reviewed the CT scan images    I reviewed the notes from the previous clinic visits      IMPRESSION AND PLAN:   Donita Lerner is a 65-year-old woman who was initially referred for evaluation of a neck mass.  She did present with acute infectious type  picture.  She was treated with a course of antibiotics.  She does have a residual area in the left neck with room enhancement that does appear to be consistent with ongoing infectious changes.  I discussed with her that the differential can include both benign and malignant etiologies.  She has no previous history of neck swelling with upper respiratory illnesses.  We discussed that while brachial cleft cyst is certainly possible in adults of her age, there are other etiologies that need to be ruled out to ensure that appropriate management is pursued.  Specifically we spent time discussing that a metastatic squamous cell carcinoma of the oropharynx or a cystic metastasis from a thyroid cancer are both on the differential.  She did have a previous FNA which showed infectious/inflammatory changes.  I would like to repeat this biopsy under image guidance.  We will send the FNA material for cytology but also for p16 washings and thyroglobulin just to ensure that there are no other concerning findings that would change her management.  She did ask about lymphoma given her mother's history and we discussed that typically lymphoma does not present as a cystic neck mass.  There does appear to be a nodule in her thyroid on the CT scan and I would like to get an ultrasound of the thyroid test to further evaluate this.  She notes the symptoms to watch for if she has recurrent signs of infection she should notify us.  We will determine her follow-up plan once she has the needle biopsy performed and the results are finalized.      Thank you very much for the opportunity to participate in the care of your patient.      Radha Flores MD, M.D.  Otolaryngology- Head & Neck Surgery      This note was dictated with voice recognition software and then edited. Please excuse any unintentional errors.

## 2021-09-28 DIAGNOSIS — R22.1 NECK MASS: Primary | ICD-10-CM

## 2021-09-28 NOTE — PROGRESS NOTES
Outpatient Neuroradiology Biopsy Referral    Patient is a 64 y/o female with a PMH of HTN, osteoporosis, meniere's disease, anxiety, depression, recent minor sore throat, diarrhea, vomiting, neck abscess 9/8/21.  CT 9/22/21 notes a left cystic lesion to the left sternocleidomastoid muscle body in the left cervical triangle now 1.7 x 1.7 cm previously 2.7 x 2.2 cm 9/7/21.  This was previously biopsied 9/8/21 resulting as infectious/inflammatory changes, treated with antibiotics. She does have a residual area in the left neck with room enhancement that does appear to be consistent with ongoing infectious changes, or brachial cleft cyst, SCC of the oropharynx, thyroid cancer.  CT also notes several prominent cervical lymph nodes, 1 x 1 cm left level 5, 1.4 cm level 2 and a 1.7 cm level 2.  IR has been asked to biopsy the left neck cystic mass for diagnosis and if possible one of the adjacent left enlarged cervical nodes by Dr. Flores and Cindy Steiner NP ENT.    Case and imaging CT 9/22/21 was reviewed with Dr. Owens  from Neuroradiology and FNA biopsy of the Left neck cystic lesion and FNA biopsy of the Left Level 2 B node is recommended. Series 2 Image 57.    ne    Procedure orders and cytology non gyn, flow cytometry pathology, gram stain, anaerobic and aerobic cultures orders placed for Left cystic mass. Dr. Flores ENT requests to include p16 and thyroglobulin washings to help rule out malignancy (referring team to enter). Also include cultures due to previous strep infection (ordered).     Samples for Left Level 2 B neck lymph node ordered: Surg path, leukemia lymphoma, FNA.    If requesting team would like sample sent for anything else please enter prior to scheduled procedure.     Primary team Dr. Flores and Allyson Steiner NP made aware of Neuroradiology recommendations via epic messaging.     TERRA Wahl CNP  Interventional Radiology   IR on-call pager: [phone]

## 2021-09-29 DIAGNOSIS — Z11.59 ENCOUNTER FOR SCREENING FOR OTHER VIRAL DISEASES: ICD-10-CM

## 2021-10-04 RX ORDER — LIDOCAINE 40 MG/G
CREAM TOPICAL
Status: CANCELLED | OUTPATIENT
Start: 2021-10-04

## 2021-10-04 RX ORDER — SODIUM CHLORIDE 9 MG/ML
INJECTION, SOLUTION INTRAVENOUS CONTINUOUS
Status: CANCELLED | OUTPATIENT
Start: 2021-10-04

## 2021-10-10 ENCOUNTER — LAB (OUTPATIENT)
Dept: FAMILY MEDICINE | Facility: CLINIC | Age: 66
End: 2021-10-10
Attending: NURSE PRACTITIONER
Payer: COMMERCIAL

## 2021-10-10 DIAGNOSIS — Z11.59 ENCOUNTER FOR SCREENING FOR OTHER VIRAL DISEASES: ICD-10-CM

## 2021-10-10 PROCEDURE — U0005 INFEC AGEN DETEC AMPLI PROBE: HCPCS

## 2021-10-10 PROCEDURE — U0003 INFECTIOUS AGENT DETECTION BY NUCLEIC ACID (DNA OR RNA); SEVERE ACUTE RESPIRATORY SYNDROME CORONAVIRUS 2 (SARS-COV-2) (CORONAVIRUS DISEASE [COVID-19]), AMPLIFIED PROBE TECHNIQUE, MAKING USE OF HIGH THROUGHPUT TECHNOLOGIES AS DESCRIBED BY CMS-2020-01-R: HCPCS

## 2021-10-11 ENCOUNTER — TELEPHONE (OUTPATIENT)
Dept: INTERVENTIONAL RADIOLOGY/VASCULAR | Facility: CLINIC | Age: 66
End: 2021-10-11

## 2021-10-11 LAB — SARS-COV-2 RNA RESP QL NAA+PROBE: NEGATIVE

## 2021-10-14 ENCOUNTER — HOSPITAL ENCOUNTER (OUTPATIENT)
Dept: INTERVENTIONAL RADIOLOGY/VASCULAR | Facility: CLINIC | Age: 66
End: 2021-10-14
Attending: NURSE PRACTITIONER | Admitting: OTOLARYNGOLOGY
Payer: COMMERCIAL

## 2021-10-14 ENCOUNTER — HOSPITAL ENCOUNTER (OUTPATIENT)
Facility: CLINIC | Age: 66
Discharge: HOME | End: 2021-10-14
Attending: OTOLARYNGOLOGY | Admitting: STUDENT IN AN ORGANIZED HEALTH CARE EDUCATION/TRAINING PROGRAM
Payer: COMMERCIAL

## 2021-10-14 ENCOUNTER — APPOINTMENT (OUTPATIENT)
Dept: MEDSURG UNIT | Facility: CLINIC | Age: 66
End: 2021-10-14
Attending: OTOLARYNGOLOGY
Payer: COMMERCIAL

## 2021-10-14 VITALS
BODY MASS INDEX: 38.75 KG/M2 | RESPIRATION RATE: 16 BRPM | SYSTOLIC BLOOD PRESSURE: 132 MMHG | HEIGHT: 67 IN | DIASTOLIC BLOOD PRESSURE: 68 MMHG | HEART RATE: 49 BPM | OXYGEN SATURATION: 95 % | WEIGHT: 246.91 LBS

## 2021-10-14 VITALS
RESPIRATION RATE: 16 BRPM | SYSTOLIC BLOOD PRESSURE: 139 MMHG | OXYGEN SATURATION: 97 % | HEART RATE: 56 BPM | DIASTOLIC BLOOD PRESSURE: 83 MMHG | TEMPERATURE: 98.1 F

## 2021-10-14 DIAGNOSIS — R22.1 NECK MASS: ICD-10-CM

## 2021-10-14 DIAGNOSIS — R22.1 MASS OF LEFT SIDE OF NECK: Primary | ICD-10-CM

## 2021-10-14 LAB
APTT PPP: 30 SECONDS (ref 22–38)
ERYTHROCYTE [DISTWIDTH] IN BLOOD BY AUTOMATED COUNT: 13.8 % (ref 10–15)
GRAM STAIN RESULT: ABNORMAL
GRAM STAIN RESULT: ABNORMAL
HCT VFR BLD AUTO: 43.3 % (ref 35–47)
HGB BLD-MCNC: 13.9 G/DL (ref 11.7–15.7)
INR PPP: 1.08 (ref 0.85–1.15)
MCH RBC QN AUTO: 30 PG (ref 26.5–33)
MCHC RBC AUTO-ENTMCNC: 32.1 G/DL (ref 31.5–36.5)
MCV RBC AUTO: 93 FL (ref 78–100)
PLATELET # BLD AUTO: 215 10E3/UL (ref 150–450)
RBC # BLD AUTO: 4.64 10E6/UL (ref 3.8–5.2)
WBC # BLD AUTO: 6.2 10E3/UL (ref 4–11)

## 2021-10-14 PROCEDURE — 87070 CULTURE OTHR SPECIMN AEROBIC: CPT | Performed by: NURSE PRACTITIONER

## 2021-10-14 PROCEDURE — 250N000009 HC RX 250: Performed by: STUDENT IN AN ORGANIZED HEALTH CARE EDUCATION/TRAINING PROGRAM

## 2021-10-14 PROCEDURE — 85730 THROMBOPLASTIN TIME PARTIAL: CPT | Performed by: NURSE PRACTITIONER

## 2021-10-14 PROCEDURE — 88185 FLOWCYTOMETRY/TC ADD-ON: CPT | Performed by: NURSE PRACTITIONER

## 2021-10-14 PROCEDURE — 85027 COMPLETE CBC AUTOMATED: CPT | Performed by: NURSE PRACTITIONER

## 2021-10-14 PROCEDURE — 85610 PROTHROMBIN TIME: CPT | Performed by: NURSE PRACTITIONER

## 2021-10-14 PROCEDURE — 87075 CULTR BACTERIA EXCEPT BLOOD: CPT | Performed by: NURSE PRACTITIONER

## 2021-10-14 PROCEDURE — 88189 FLOWCYTOMETRY/READ 16 & >: CPT | Performed by: PATHOLOGY

## 2021-10-14 PROCEDURE — 36415 COLL VENOUS BLD VENIPUNCTURE: CPT | Performed by: NURSE PRACTITIONER

## 2021-10-14 PROCEDURE — 10005 FNA BX W/US GDN 1ST LES: CPT

## 2021-10-14 PROCEDURE — 88305 TISSUE EXAM BY PATHOLOGIST: CPT | Mod: TC | Performed by: NURSE PRACTITIONER

## 2021-10-14 PROCEDURE — 999N000142 HC STATISTIC PROCEDURE PREP ONLY

## 2021-10-14 PROCEDURE — 87205 SMEAR GRAM STAIN: CPT | Performed by: NURSE PRACTITIONER

## 2021-10-14 PROCEDURE — 999N000132 HC STATISTIC PP CARE STAGE 1

## 2021-10-14 PROCEDURE — 10005 FNA BX W/US GDN 1ST LES: CPT | Performed by: STUDENT IN AN ORGANIZED HEALTH CARE EDUCATION/TRAINING PROGRAM

## 2021-10-14 PROCEDURE — 258N000003 HC RX IP 258 OP 636: Performed by: NURSE PRACTITIONER

## 2021-10-14 RX ORDER — LIDOCAINE HYDROCHLORIDE 10 MG/ML
1-30 INJECTION, SOLUTION EPIDURAL; INFILTRATION; INTRACAUDAL; PERINEURAL
Status: COMPLETED | OUTPATIENT
Start: 2021-10-14 | End: 2021-10-14

## 2021-10-14 RX ORDER — SODIUM CHLORIDE 9 MG/ML
INJECTION, SOLUTION INTRAVENOUS CONTINUOUS
Status: DISCONTINUED | OUTPATIENT
Start: 2021-10-14 | End: 2021-10-15 | Stop reason: HOSPADM

## 2021-10-14 RX ORDER — IBUPROFEN 200 MG
600 TABLET ORAL EVERY 4 HOURS PRN
COMMUNITY
End: 2023-01-13

## 2021-10-14 RX ORDER — LIDOCAINE 40 MG/G
CREAM TOPICAL
Status: DISCONTINUED | OUTPATIENT
Start: 2021-10-14 | End: 2021-10-15 | Stop reason: HOSPADM

## 2021-10-14 RX ADMIN — SODIUM CHLORIDE: 9 INJECTION, SOLUTION INTRAVENOUS at 09:14

## 2021-10-14 RX ADMIN — LIDOCAINE HYDROCHLORIDE 5 ML: 10 INJECTION, SOLUTION EPIDURAL; INFILTRATION; INTRACAUDAL; PERINEURAL at 11:14

## 2021-10-14 ASSESSMENT — MIFFLIN-ST. JEOR: SCORE: 1697.75

## 2021-10-14 NOTE — PROGRESS NOTES
Patient Name: Donita Lerner  Medical Record Number: [id number]  Today's Date: 10/14/2021    Procedure: Ultrasound guided fine needle aspiration biopsy of left neck cystic lesion  Proceduralist: Dr. Robertson  Pathology present: Yes    Procedure Start: 1015  Procedure end: 1111  Sedation medications administered: NA     Report given to: Sherley GUTIERREZ  : CHLOE    Other Notes: Pt arrived to IR room 6 from . Consent reviewed. Pt denies any questions or concerns regarding procedure. Pt positioned supine and monitored per protocol. Pt tolerated procedure without any noted complications. Pathology present for specimen evaluation, sent to lab. Pt transferred back to .

## 2021-10-14 NOTE — DISCHARGE INSTRUCTIONS
[facility]    Interventional Radiology  Patient Instructions Following Lymph Node Biopsy    AFTER YOU GO HOME  ? If you were given sedation DO NOT drive or operate machinery at home or at work for at least 24 hours  ? DO relax and take it easy for 48 hours, no strenuous activity for 24 hours  ? DO drink plenty of fluids  ? DO resume your regular diet, unless otherwise instructed by your Primary Physician  ? Keep the dressing dry and in place for 24 hours.  ? DO NOT SMOKE FOR AT LEAST 24 HOURS, if you have been given any medications that were to help you relax or sedate you during your procedure  ? DO NOT drink alcoholic beverages the day of your procedure  ? DO NOT do any strenuous exercise or lifting (> 10 lbs) for at least 7 days following your procedure  ? DO NOT take a bath or shower for at least 12 hours following your procedure  ? Remove dressing after shower the next day. Replace with Band aid for 2 days.  Never leave a wet dressing in place.  ? DO NOT make any important or legal decisions for 24 hours following your procedure  ? There should be minimum drainage from the biopsy site    CALL THE PHYSICIAN IF:  ? You start bleeding from the procedure site.  If you do start to bleed from that site, lie down flat and hold pressure on the site for a minimum of 10 minutes.  Your physician will tell you if you need to return to the hospital  ? You develop nausea or vomiting  ? You have excessive swelling, redness, or tenderness at the site  ? You have drainage that looks like it is infected.  ? You experience severe pain  ? You develop hives or a rash or unexplained itching  ? You develop shortness of breath  ? You develop a temperature of 101 degrees F or greater  ? You develop bloody clots or red urine after you are discharged  ? You develop chest pain or cough up blood, lightheadedness or fainting        [facility] INTERVENTIONAL RADIOLOGY DEPARTMENT  Procedure Physician: Dr. Robertson                                      Date of procedure: October 14, 2021  Telephone Numbers: [phone] Monday-Friday 8:00 am to 4:30 pm  [phone] After 4:30 pm Monday-Friday, Weekends & Holidays.   Ask for the Interventional Radiologist on call.  Someone is on call 24 hrs/day  [facility] toll free number: [phone] Monday-Friday 8:00 am to 4:30 pm  [facility] Emergency Dept: [phone]

## 2021-10-14 NOTE — PROGRESS NOTES
Condition is stable s/p lymph node biopsy. Vital Signs are stable/WNL. Left sided neck site clean, dry and intact, cms intact. Discharge instructions reviewed with patient and questions answered. Patient verbalizes understanding. IV removed. Pain under control. Patient is ambulating and voiding spontaneously. Patient is tolerating regular diet and denies any N/V. Patient to be discharged to home via . Patient has all belongings

## 2021-10-14 NOTE — PROGRESS NOTES
Pt prepped for lymph node biopsy.PIV placed and labs sent and NS started.Consent signed and questions answered with  at bedside.

## 2021-10-15 LAB
PATH REPORT.COMMENTS IMP SPEC: NORMAL
PATH REPORT.FINAL DX SPEC: NORMAL
PATH REPORT.MICROSCOPIC SPEC OTHER STN: NORMAL
PATH REPORT.RELEVANT HX SPEC: NORMAL

## 2021-10-18 LAB
PATH REPORT.COMMENTS IMP SPEC: ABNORMAL
PATH REPORT.COMMENTS IMP SPEC: YES
PATH REPORT.FINAL DX SPEC: ABNORMAL
PATH REPORT.GROSS SPEC: ABNORMAL
PATH REPORT.RELEVANT HX SPEC: ABNORMAL

## 2021-10-18 PROCEDURE — 88173 CYTOPATH EVAL FNA REPORT: CPT | Mod: 26 | Performed by: PATHOLOGY

## 2021-10-18 PROCEDURE — 88305 TISSUE EXAM BY PATHOLOGIST: CPT | Mod: 26 | Performed by: PATHOLOGY

## 2021-10-18 PROCEDURE — 88172 CYTP DX EVAL FNA 1ST EA SITE: CPT | Mod: 26 | Performed by: PATHOLOGY

## 2021-10-18 PROCEDURE — 88312 SPECIAL STAINS GROUP 1: CPT | Mod: 26 | Performed by: PATHOLOGY

## 2021-10-19 LAB — BACTERIA FLD CULT: NO GROWTH

## 2021-10-21 ENCOUNTER — PATIENT OUTREACH (OUTPATIENT)
Dept: OTOLARYNGOLOGY | Facility: CLINIC | Age: 66
End: 2021-10-21

## 2021-10-21 LAB — BACTERIA ASPIRATE CULT: NORMAL

## 2021-10-21 NOTE — PROGRESS NOTES
Called and left message for patient requesting call back to review results of recent biopsy. Advised patient that Dr. Flores would like to review everything at tumor board on Friday. Advised patient return call to review results. Writer will also call tomorrow following tumor board.     Left direct line for return call.       Ashly Patricia RN, BSN

## 2021-10-22 ENCOUNTER — TUMOR CONFERENCE (OUTPATIENT)
Dept: ONCOLOGY | Facility: CLINIC | Age: 66
End: 2021-10-22
Payer: COMMERCIAL

## 2021-10-22 DIAGNOSIS — L02.11 NECK ABSCESS: Primary | ICD-10-CM

## 2021-10-22 RX ORDER — CLINDAMYCIN HCL 300 MG
300 CAPSULE ORAL 4 TIMES DAILY
Qty: 40 CAPSULE | Refills: 0 | Status: SHIPPED | OUTPATIENT
Start: 2021-10-22 | End: 2021-11-01

## 2021-10-22 NOTE — TUMOR CONFERENCE
Head & Neck Tumor Conference Note     Status: New  Staff: Dr. Flores     Tumor Site: left neck   Tumor Pathology: pending  Tumor Treatment: biopsy, further treatment pending    Reason for Review: Review imaging, path, and POC    Brief History:   Donita Lerner is a 65-year-old woman with a cystic neck mass.  The patient was seen in clinic on 9/8/2021 with a left-sided cystic neck mass that has been present for about 3 days with the preceding sore throat.  She was seen by her primary care physician and had a rapid strep test that was negative.  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  She was seen in clinic by the PA 9/8/2021.  At that time she had increase in size of the mass and overlying skin erythema with hypotension.  A needle biopsy was performed in the clinic at that time which showed inflammatory cells and bacteria.  Of note thyroglobulin was sent but not completed.  She did have flexible fiberoptic laryngoscopy performed at that time without obvious lesion in the tonsil or base of tongue.  She was admitted to the hospital and treated with a course of antibiotics.  She was seen in clinic in follow-up by the PA on 9/15/2021 and was improved with a persistent neck mass.  She comes in today with repeat imaging.  This demonstrated a 1.7 x 1.7 cm cystic left neck lesion with rim enhancement along with some prominent lymph nodes.        Pertinent PMH:   Past Medical History:   Diagnosis Date     Arthritis      Depressive disorder, not elsewhere classified      Generalized anxiety disorder      Hypertension     No cardiologist     Meniere's disease 5/17/2005     Problem list name updated by automated process. Provider to review     Meniere's disease, unspecified      Sensorineural hearing loss, unspecified     left ear since birth complete     Unspecified episodic mood disorder      depression worsens winter SADD        Smoking Hx:   Social History     Tobacco Use     Smoking status: Never Smoker     Smokeless tobacco: Never Used   Substance Use Topics     Alcohol use: No     Drug use: No       Imaging:     10/14/21 IR biopsy left neck lesion  Impression: Successful fine-needle aspiration biopsy of left neck  mass. Note that the lesion was smaller compared with prior CT and  appeared more decompressed on ultrasonography. No abscess-like  aspirate could be obtained within the center of the lesion. Samples  were sent for aerobic/anaerobic cultures as well as RPMI for lymphoma  workup and cytologic examination.      CT Soft tissue Neck 9/22/21  Impression:  1. Decreased size of the walled off cystic lesion in the posterior  left cervical space with ongoing surrounding inflammatory stranding.  Infected branchial cleft cyst remains in the differential versus less  likely an infected cystic metastasis.  2. Mild reactive cervical lymphadenopathy.  3. Chronic right maxillary sinusitis.    Pathology:   Final Diagnosis   Specimen A                 Interpretation:                  Negative for malignancy                 Other Findings:                  Features suggestive of a granuloma, Acute inflammation present.                 Adequacy:                 Satisfactory for evaluation       Tumor Board Recommendation:   Discussion: This is a 65 year old female who presents with a left neck mass. She did present with acute infectious type picture.  She was treated with a course of antibiotics.  She does have a residual area in the left neck with room enhancement that does appear to be consistent with ongoing infectious changes.     Her differential was discussed; infected branchial cleft cyst vs infected p16+ node. Picture complicated by thryoid nodule, given metastases from PTC can present with cystic masses.      Plan:   -- obtain thryoid ultrasound with possible FNA of nodule.     Ella Adam MD  PGY-3  Otolaryngology - Head and Neck Surgery    Documentation / Disclaimer Cancer Tumor Board Note  Cancer tumor board recommendations do not override what is determined to be reasonable care and treatment, which is dependent on the circumstances of a patient's case; the patient's medical, social, and personal concerns; and the clinical judgment of the oncologist [physician].

## 2021-10-22 NOTE — PROGRESS NOTES
Called patient with tumor board recommendations and plan for thyroid US and return appointment with Dr. Flores to discuss surgery excision. Patient in agreement with plan.     She does state that she feels worsening pain, growth and warmth in the area on her neck. She is concerned for worsening infection. Reviewed with Dr. Flores and will plan for patient to restart antibiotics. Prescription for clindamycin sent to pharmacy. Advised patient she will receive a call from schedulers to arrange thyroid US and follow-up with Dr. Flores.     Ashly Patricia, RN, BSN

## 2021-10-24 ENCOUNTER — OFFICE VISIT (OUTPATIENT)
Dept: URGENT CARE | Facility: URGENT CARE | Age: 66
End: 2021-10-24
Payer: COMMERCIAL

## 2021-10-24 ENCOUNTER — MYC MEDICAL ADVICE (OUTPATIENT)
Dept: OTOLARYNGOLOGY | Facility: CLINIC | Age: 66
End: 2021-10-24

## 2021-10-24 VITALS
RESPIRATION RATE: 16 BRPM | BODY MASS INDEX: 40.24 KG/M2 | DIASTOLIC BLOOD PRESSURE: 80 MMHG | WEIGHT: 257 LBS | OXYGEN SATURATION: 97 % | TEMPERATURE: 97.8 F | SYSTOLIC BLOOD PRESSURE: 120 MMHG | HEART RATE: 58 BPM

## 2021-10-24 DIAGNOSIS — L02.11 CELLULITIS AND ABSCESS OF NECK: Primary | ICD-10-CM

## 2021-10-24 DIAGNOSIS — L03.221 CELLULITIS AND ABSCESS OF NECK: Primary | ICD-10-CM

## 2021-10-24 PROCEDURE — 99213 OFFICE O/P EST LOW 20 MIN: CPT | Performed by: PHYSICIAN ASSISTANT

## 2021-10-24 NOTE — PROGRESS NOTES
"  Assessment & Plan     Cellulitis and abscess of neck  Continue with clindamycin. No significant spreading redness. Patient will follow up with ENT this week. Discussed in detail symptoms that would warrant emergent evaluation in the ED. Patient agrees with plan and will follow up as needed.                 BMI:   Estimated body mass index is 40.24 kg/m  as calculated from the following:    Height as of 10/14/21: 1.702 m (5' 7.01\").    Weight as of this encounter: 116.6 kg (257 lb).           Return in about 2 days (around 10/26/2021), or if symptoms worsen or fail to improve.    Karishma Ford PA-C  [facility] [place]            Subjective   Chief Complaint   Patient presents with     Mass      already took 7 capsules of clindamyacin for neck abcess, would like it looked at-worried getting bigger and might burst, she is waiting for phone call tomorrow to set up US and ENT fu       HPI     Neck mass   Patient has been seeing ENT for management of a neck mass that is possibly infectious versus metastatic. Was started on clindamycin Friday. Has noticed increased redness and swelling.              Review of Systems   Constitutional, HEENT, cardiovascular, pulmonary, gi and gu systems are negative, except as otherwise noted.      Objective    /80 (BP Location: Right arm, Patient Position: Sitting, Cuff Size: Adult Large)   Pulse 58   Temp 97.8  F (36.6  C) (Tympanic)   Resp 16   Wt 116.6 kg (257 lb)   LMP 07/01/2002   SpO2 97%   BMI 40.24 kg/m    Body mass index is 40.24 kg/m .  Physical Exam  Constitutional:       General: She is not in acute distress.  Neck:        Comments: Left neck has a firm, red, tender cyst.   Neurological:      Mental Status: She is alert.                        "

## 2021-10-25 ENCOUNTER — PATIENT OUTREACH (OUTPATIENT)
Dept: OTOLARYNGOLOGY | Facility: CLINIC | Age: 66
End: 2021-10-25

## 2021-10-25 NOTE — TELEPHONE ENCOUNTER
"Returned call to patient who indicates that over the weekend an area on her neck increased in size and become more red and painful. She indicates that this is a new area that is a bit posterior to her previously known neck mass. Patient indicates that this area \"popped\" yesterday and has since been oozing a bit. She has placed a Band-Aid over the area. No additional concerns at this time.   We will have patient return to clinic with Dr. Flores on Wednesday instead of next week. Appointment rescheduled and thyroid ultrasound scheduled to follow. Patient will continue antibiotics and return call with new or worsening symptoms.       Ashly Patricia, RN, BSN    "

## 2021-10-27 ENCOUNTER — OFFICE VISIT (OUTPATIENT)
Dept: OTOLARYNGOLOGY | Facility: CLINIC | Age: 66
End: 2021-10-27
Payer: COMMERCIAL

## 2021-10-27 ENCOUNTER — ANCILLARY PROCEDURE (OUTPATIENT)
Dept: ULTRASOUND IMAGING | Facility: CLINIC | Age: 66
End: 2021-10-27
Attending: OTOLARYNGOLOGY
Payer: COMMERCIAL

## 2021-10-27 VITALS
HEART RATE: 69 BPM | OXYGEN SATURATION: 99 % | TEMPERATURE: 96.6 F | WEIGHT: 248 LBS | HEIGHT: 67 IN | BODY MASS INDEX: 38.92 KG/M2

## 2021-10-27 DIAGNOSIS — E04.1 THYROID NODULE: ICD-10-CM

## 2021-10-27 DIAGNOSIS — R22.1 MASS OF LEFT SIDE OF NECK: Primary | ICD-10-CM

## 2021-10-27 PROCEDURE — 76536 US EXAM OF HEAD AND NECK: CPT | Mod: GC | Performed by: RADIOLOGY

## 2021-10-27 PROCEDURE — 99214 OFFICE O/P EST MOD 30 MIN: CPT | Performed by: OTOLARYNGOLOGY

## 2021-10-27 ASSESSMENT — PAIN SCALES - GENERAL: PAINLEVEL: NO PAIN (0)

## 2021-10-27 ASSESSMENT — MIFFLIN-ST. JEOR: SCORE: 1702.55

## 2021-10-27 NOTE — NURSING NOTE
"Chief Complaint   Patient presents with     RECHECK     US thyroid and follow up       Pulse 69, temperature (!) 96.6  F (35.9  C), temperature source Temporal, height 1.702 m (5' 7\"), weight 112.5 kg (248 lb), last menstrual period 07/01/2002, SpO2 99 %, not currently breastfeeding.    Michelle Siddiqui, EMT    "

## 2021-10-27 NOTE — PROGRESS NOTES
Dear Dr. Moore:    I had the pleasure of seeing Donita Lerner in follow-up today at the [facility].     History of Present Illness:   Donita Lerner is a 65-year-old woman with a cystic neck mass.  The patient was seen in clinic on 9/8/2021 with a left-sided cystic neck mass that has been present for about 3 days with the preceding sore throat.  She was seen by her primary care physician and had a rapid strep test that was negative.  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  She was seen in clinic by the PA 9/8/2021.  At that time she had increase in size of the mass and overlying skin erythema with hypotension.  A needle biopsy was performed in the clinic at that time which showed inflammatory cells and bacteria.  Of note thyroglobulin was sent but not completed.  She did have flexible fiberoptic laryngoscopy performed at that time without obvious lesion in the tonsil or base of tongue.  She was admitted to the hospital and treated with a course of antibiotics.  She was seen in clinic in follow-up by the PA on 9/15/2021 and was improved with a persistent neck mass.  She comes in today with repeat imaging.  This demonstrated a 1.7 x 1.7 cm cystic left neck lesion with rim enhancement along with some prominent lymph nodes.        Interval history:  She comes in today for follow-up. She was last seen in September 2021. She was recommended for a thyroid U/S which was never done. She had a repeat needle biopsy which showed only infection, no signs of malignancy. Her case was reviewed at tumor board on Friday, recommended for thyroid U/S and excision of neck mass with possible completion neck dissection. She called in on Friday with increased redness, started on antibiotics. She then had new mass develop and redness with drainage from the skin on  Sunday. She says this spot is further back than the previous mass. She no longer has any pain and the drainage helped with the pressure sensation. She does have some soreness under her ear. She is eating and drinking without issue. She has no fevers or chills. She is having her thyroid U/S later today.    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     She says her son and niece both had thyroid cancer.       MEDICATIONS:     Current Outpatient Medications   Medication Sig Dispense Refill     amLODIPine (NORVASC) 5 MG tablet Take 1.5 tablets (7.5 mg) by mouth daily 135 tablet 1     buPROPion (WELLBUTRIN XL) 300 MG 24 hr tablet Take 1 tablet (300 mg) by mouth every morning DO NOT CRUSH. 90 tablet 1     Cholecalciferol (VITAMIN D) 2000 UNITS tablet Take 2,000 Units by mouth daily 100 tablet 3     clindamycin (CLEOCIN) 300 MG capsule Take 1 capsule (300 mg) by mouth 4 times daily for 10 days 40 capsule 0     escitalopram (LEXAPRO) 20 MG tablet Take 1 tablet (20 mg) by mouth daily 90 tablet 1     ibuprofen (ADVIL/MOTRIN) 200 MG tablet Take 600 mg by mouth every 4 hours as needed for mild pain       metoprolol succinate ER (TOPROL-XL) 25 MG 24 hr tablet TAKE 1 TABLET DAILY 90 tablet 3     order for DME Equipment being ordered: LILA compression stockings 1 Units 0       ALLERGIES:    Allergies   Allergen Reactions     Penicillins Anaphylaxis and Swelling     Lisinopril Cough     Sulfa Drugs Rash       HABITS/SOCIAL HISTORY:   Non-smoker.  No history of alcohol.      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     Employer: [employer]   Tobacco Use     Smoking status: Never Smoker     Smokeless  tobacco: Never Used   Substance and Sexual Activity     Alcohol use: No     Drug use: No     Sexual activity: Yes     Partners: Male     Birth control/protection: Post-menopausal   Other Topics Concern     Parent/sibling w/ CABG, MI or angioplasty before 65F 55M? Not Asked   Social History Narrative     Not on file     Social Determinants of Health     Financial Resource Strain:      Difficulty of Paying Living Expenses:    Food Insecurity:      Worried About Running Out of Food in the Last Year:      Ran Out of Food in the Last Year:    Transportation Needs:      Lack of Transportation (Medical):      Lack of Transportation (Non-Medical):    Physical Activity:      Days of Exercise per Week:      Minutes of Exercise per Session:    Stress:      Feeling of Stress :    Social Connections:      Frequency of Communication with Friends and Family:      Frequency of Social Gatherings with Friends and Family:      Attends Moravian Services:      Active Member of Clubs or Organizations:      Attends Club or Organization Meetings:      Marital Status:    Intimate Partner Violence:      Fear of Current or Ex-Partner:      Emotionally Abused:      Physically Abused:      Sexually Abused:        PAST MEDICAL HISTORY:   Past Medical History:   Diagnosis Date     Arthritis      Depressive disorder, not elsewhere classified      Generalized anxiety disorder      Hypertension     No cardiologist     Meniere's disease 5/17/2005     Problem list name updated by automated process. Provider to review     Meniere's disease, unspecified      Sensorineural hearing loss, unspecified     left ear since birth complete     Unspecified episodic mood disorder     depression worsens winter SADD        PAST SURGICAL HISTORY:   Past Surgical History:   Procedure Laterality Date     ADENOIDECTOMY  1967     ARTHROPLASTY KNEE  6/17/2013    Procedure: ARTHROPLASTY KNEE;  Right Total Knee Arthroplasty  ;  Surgeon: Ganesh Sheikh MD;   "Location: [facility]     ARTHROPLASTY KNEE UNICOMPARTMENT Left 2016    Procedure: ARTHROPLASTY KNEE UNICOMPARTMENT;  Surgeon: Ganesh Sheikh MD;  Location: [facility] OR     C ANESTH, SECTION      x3     IR LYMPH NODE BIOPSY  10/14/2021     LAPAROSCOPIC CHOLECYSTECTOMY WITH CHOLANGIOGRAMS N/A 2018    Procedure: LAPAROSCOPIC CHOLECYSTECTOMY WITH CHOLANGIOGRAMS;  Laparoscopic Cholecystectomy with Intraoperative Cholangiogram;  Surgeon: Erick Rivera MD;  Location: [facility]     TONSILLECTOMY  1967       FAMILY HISTORY:    Family History   Problem Relation Age of Onset     Cancer Mother         Lymphoma     Depression Mother      Cancer Father         Carcinoma Mesothelioma ([address])     Psychotic Disorder Daughter         borderline personality disorder     Depression Brother        REVIEW OF SYSTEMS:  12 point ROS was negative other than the symptoms noted above in the HPI.  Patient Supplied Answers to Review of Systems  [facility] ROS 10/26/2021   Constitutional Weight gain   Neurology -   Ears, Nose, Throat -   Cardiopulmonary -   Gastrointestinal/Genitourinary -   Musculoskeletal Swollen joints, Neck pain   Hematologic Lymph node swelling         PHYSICAL EXAMINATION:   Pulse 69   Temp (!) 96.6  F (35.9  C) (Temporal)   Ht 1.702 m (5' 7\")   Wt 112.5 kg (248 lb)   LMP 2002   SpO2 99%   BMI 38.84 kg/m    Appearance:   normal; NAD, age-appropriate appearance, well-developed, normal habitus   Communication:   normal; communicates verbally, normal voice quality   Head/Face:   inspection -  Normal; no scars or visible lesions   Facial strength -  Left lower lip weakness is very subtle, otherwise normal facial movement   Skin:  normal, no rash   Ears:  auricle (AD) -  normal  EAC (AD) -  normal  TM (AD) -  Normal, no effusion  auricle (AS) -  normal  EAC (AS) -  normal  TM (AS) -  Normal, no effusion  Normal clinical speech reception   Nose:  Ext. inspection -  Normal   Oral Cavity:  lips -  " Normal mucosa, oral competence, and stoma size   Age-appropriate dentition, healthy gingival mucosa   Hard palate, buccal, floor of mouth mucosa normal   Tongue - normal movement, no lesions, no palpable masses   Oropharynx:  mucosa -  Normal, no lesions  soft palate -  Normal, no lesions, no asymmetry, normal elevation   Neck: Right posterior neck with area of skin breakdown, less than 1 cm in size, able to express small amount of purulent drainage, underlying fullness but hard to discern borders   Lymphatic:  no abnormal nodes   Cardiovascular:  warm, pink, well-perfused extremities without swelling, tenderness, or edema   Respiratory:  Normal respiratory effort, no stridor   Neuro/Psych.:  mood/affect -  normal  mental status -  normal       PROCEDURES:   RESULTS REVIEWED:   I reviewed the tumor board notes.    U/S report reviewed after visit      IMPRESSION AND PLAN:   Donita Lerner is a 65-year-old woman who was initially referred for evaluation of a neck mass.  She did present with acute infectious type picture.  She was treated with a course of antibiotics.  She does have a residual area in the left neck with room enhancement that does appear to be consistent with ongoing infectious changes.      I discussed with her that the differential can include both benign and malignant etiologies.  We discussed that while brachial cleft cyst is certainly possible in adults of her age, there are other etiologies that need to be ruled out to ensure that appropriate management is pursued.  Specifically we spent time discussing that a metastatic squamous cell carcinoma of the oropharynx or a cystic metastasis from a thyroid cancer are both on the differential.      There does appear to be a nodule in her thyroid on the CT scan. She is scheduled for this later today. Pending the results of this, she may need a thyroid biopsy.    We discussed that recommendations for management would be surgical resection of the right neck  mass. We would send a frozen section from the excision. We would need to be prepared to do a completion neck dissection pending the results of the frozen section. We discussed that if this is a cystic neck mass from a SCC then we would perform a level II-IV neck dissection. If this is a cystic neck mass from a thyroid cancer then we would perform a level II-V neck dissection. She would then need management of the primary site.    We discussed the surgery and the risks. If this is just excision of the cyst she can go home the same day. If she has a neck dissection she would be admitted. Either way she would have a LINH drain in place which she would be discharged with (2 if thyroid neck dissection). We discussed the risks of scarring, bleeding, infection, damage to surrounding structures. We discussed the risks to the marginal mandibular nerve, spinal accessory nerve, hypoglossal nerve, phrenic nerve, vagus nerve. We discussed that more dissection would be required if a level V neck dissection were performed.     We will work on finding a date for surgery once we have all the final results of the workup.      Thank you very much for the opportunity to participate in the care of your patient.      Radha Flores MD, M.D.  Otolaryngology- Head & Neck Surgery      This note was dictated with voice recognition software and then edited. Please excuse any unintentional errors.

## 2021-10-27 NOTE — LETTER
10/27/2021       RE: Donita Lerner  [id number] [address]     Dear Colleague,    Thank you for referring your patient, Donita Lerner, to the [facility] [place] at [facility]. Please see a copy of my visit note below.    Dear Dr. Moore:    I had the pleasure of seeing Donita Lerner in follow-up today at the [facility].     History of Present Illness:   Donita Lerner is a 65-year-old woman with a cystic neck mass.  The patient was seen in clinic on 9/8/2021 with a left-sided cystic neck mass that has been present for about 3 days with the preceding sore throat.  She was seen by her primary care physician and had a rapid strep test that was negative.  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  She was seen in clinic by the PA 9/8/2021.  At that time she had increase in size of the mass and overlying skin erythema with hypotension.  A needle biopsy was performed in the clinic at that time which showed inflammatory cells and bacteria.  Of note thyroglobulin was sent but not completed.  She did have flexible fiberoptic laryngoscopy performed at that time without obvious lesion in the tonsil or base of tongue.  She was admitted to the hospital and treated with a course of antibiotics.  She was seen in clinic in follow-up by the PA on 9/15/2021 and was improved with a persistent neck mass.  She comes in today with repeat imaging.  This demonstrated a 1.7 x 1.7 cm cystic left neck lesion with rim enhancement along with some prominent lymph nodes.        Interval history:  She comes in today for follow-up. She was last seen in September 2021. She was recommended for a thyroid U/S which was never done. She had a repeat needle biopsy which showed only  infection, no signs of malignancy. Her case was reviewed at tumor board on Friday, recommended for thyroid U/S and excision of neck mass with possible completion neck dissection. She called in on Friday with increased redness, started on antibiotics. She then had new mass develop and redness with drainage from the skin on Sunday. She says this spot is further back than the previous mass. She no longer has any pain and the drainage helped with the pressure sensation. She does have some soreness under her ear. She is eating and drinking without issue. She has no fevers or chills. She is having her thyroid U/S later today.    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     She says her son and niece both had thyroid cancer.       MEDICATIONS:     Current Outpatient Medications   Medication Sig Dispense Refill     amLODIPine (NORVASC) 5 MG tablet Take 1.5 tablets (7.5 mg) by mouth daily 135 tablet 1     buPROPion (WELLBUTRIN XL) 300 MG 24 hr tablet Take 1 tablet (300 mg) by mouth every morning DO NOT CRUSH. 90 tablet 1     Cholecalciferol (VITAMIN D) 2000 UNITS tablet Take 2,000 Units by mouth daily 100 tablet 3     clindamycin (CLEOCIN) 300 MG capsule Take 1 capsule (300 mg) by mouth 4 times daily for 10 days 40 capsule 0     escitalopram (LEXAPRO) 20 MG tablet Take 1 tablet (20 mg) by mouth daily 90 tablet 1     ibuprofen (ADVIL/MOTRIN) 200 MG tablet Take 600 mg by mouth every 4 hours as needed for mild pain       metoprolol succinate ER (TOPROL-XL) 25 MG 24 hr tablet TAKE 1 TABLET DAILY 90 tablet 3     order for DME Equipment being ordered: LILA compression stockings 1 Units 0       ALLERGIES:    Allergies   Allergen Reactions     Penicillins Anaphylaxis and Swelling     Lisinopril Cough     Sulfa Drugs Rash       HABITS/SOCIAL HISTORY:   Non-smoker.  No history of alcohol.      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     Employer: [employer]   Tobacco Use     Smoking status: Never Smoker     Smokeless tobacco: Never Used   Substance and Sexual Activity     Alcohol use: No     Drug use: No     Sexual activity: Yes     Partners: Male     Birth control/protection: Post-menopausal   Other Topics Concern     Parent/sibling w/ CABG, MI or angioplasty before 65F 55M? Not Asked   Social History Narrative     Not on file     Social Determinants of Health     Financial Resource Strain:      Difficulty of Paying Living Expenses:    Food Insecurity:      Worried About Running Out of Food in the Last Year:      Ran Out of Food in the Last Year:    Transportation Needs:      Lack of Transportation (Medical):      Lack of Transportation (Non-Medical):    Physical Activity:      Days of Exercise per Week:      Minutes of Exercise per Session:    Stress:      Feeling of Stress :    Social Connections:      Frequency of Communication with Friends and Family:      Frequency of Social Gatherings with Friends and Family:      Attends Confucianism Services:      Active Member of Clubs or Organizations:      Attends Club or Organization Meetings:      Marital Status:    Intimate Partner Violence:      Fear of Current or Ex-Partner:      Emotionally Abused:      Physically Abused:      Sexually Abused:        PAST MEDICAL HISTORY:   Past Medical History:   Diagnosis Date     Arthritis      Depressive disorder, not elsewhere classified      Generalized anxiety disorder      Hypertension     No cardiologist     Meniere's disease 5/17/2005     Problem list name updated by automated process. Provider to review     Meniere's disease, unspecified      Sensorineural hearing loss, unspecified     left ear since birth complete  "    Unspecified episodic mood disorder     depression worsens winter SADD        PAST SURGICAL HISTORY:   Past Surgical History:   Procedure Laterality Date     ADENOIDECTOMY  1967     ARTHROPLASTY KNEE  2013    Procedure: ARTHROPLASTY KNEE;  Right Total Knee Arthroplasty  ;  Surgeon: Ganesh Sheikh MD;  Location: [facility]     ARTHROPLASTY KNEE UNICOMPARTMENT Left 2016    Procedure: ARTHROPLASTY KNEE UNICOMPARTMENT;  Surgeon: Ganesh Sheikh MD;  Location: [facility]     C ANESTH, SECTION      x3     IR LYMPH NODE BIOPSY  10/14/2021     LAPAROSCOPIC CHOLECYSTECTOMY WITH CHOLANGIOGRAMS N/A 2018    Procedure: LAPAROSCOPIC CHOLECYSTECTOMY WITH CHOLANGIOGRAMS;  Laparoscopic Cholecystectomy with Intraoperative Cholangiogram;  Surgeon: Erick Rivera MD;  Location: [facility]     TONSILLECTOMY  1967       FAMILY HISTORY:    Family History   Problem Relation Age of Onset     Cancer Mother         Lymphoma     Depression Mother      Cancer Father         Carcinoma Mesothelioma ([place])     Psychotic Disorder Daughter         borderline personality disorder     Depression Brother        REVIEW OF SYSTEMS:  12 point ROS was negative other than the symptoms noted above in the HPI.  Patient Supplied Answers to Review of Systems  [facility] ROS 10/26/2021   Constitutional Weight gain   Neurology -   Ears, Nose, Throat -   Cardiopulmonary -   Gastrointestinal/Genitourinary -   Musculoskeletal Swollen joints, Neck pain   Hematologic Lymph node swelling         PHYSICAL EXAMINATION:   Pulse 69   Temp (!) 96.6  F (35.9  C) (Temporal)   Ht 1.702 m (5' 7\")   Wt 112.5 kg (248 lb)   LMP 2002   SpO2 99%   BMI 38.84 kg/m    Appearance:   normal; NAD, age-appropriate appearance, well-developed, normal habitus   Communication:   normal; communicates verbally, normal voice quality   Head/Face:   inspection -  Normal; no scars or visible lesions   Facial strength -  Left lower lip weakness is very " subtle, otherwise normal facial movement   Skin:  normal, no rash   Ears:  auricle (AD) -  normal  EAC (AD) -  normal  TM (AD) -  Normal, no effusion  auricle (AS) -  normal  EAC (AS) -  normal  TM (AS) -  Normal, no effusion  Normal clinical speech reception   Nose:  Ext. inspection -  Normal   Oral Cavity:  lips -  Normal mucosa, oral competence, and stoma size   Age-appropriate dentition, healthy gingival mucosa   Hard palate, buccal, floor of mouth mucosa normal   Tongue - normal movement, no lesions, no palpable masses   Oropharynx:  mucosa -  Normal, no lesions  soft palate -  Normal, no lesions, no asymmetry, normal elevation   Neck: Right posterior neck with area of skin breakdown, less than 1 cm in size, able to express small amount of purulent drainage, underlying fullness but hard to discern borders   Lymphatic:  no abnormal nodes   Cardiovascular:  warm, pink, well-perfused extremities without swelling, tenderness, or edema   Respiratory:  Normal respiratory effort, no stridor   Neuro/Psych.:  mood/affect -  normal  mental status -  normal       PROCEDURES:   RESULTS REVIEWED:   I reviewed the tumor board notes.    U/S report reviewed after visit      IMPRESSION AND PLAN:   Donita Lerner is a 65-year-old woman who was initially referred for evaluation of a neck mass.  She did present with acute infectious type picture.  She was treated with a course of antibiotics.  She does have a residual area in the left neck with room enhancement that does appear to be consistent with ongoing infectious changes.      I discussed with her that the differential can include both benign and malignant etiologies.  We discussed that while brachial cleft cyst is certainly possible in adults of her age, there are other etiologies that need to be ruled out to ensure that appropriate management is pursued.  Specifically we spent time discussing that a metastatic squamous cell carcinoma of the oropharynx or a cystic metastasis  from a thyroid cancer are both on the differential.      There does appear to be a nodule in her thyroid on the CT scan. She is scheduled for this later today. Pending the results of this, she may need a thyroid biopsy.    We discussed that recommendations for management would be surgical resection of the right neck mass. We would send a frozen section from the excision. We would need to be prepared to do a completion neck dissection pending the results of the frozen section. We discussed that if this is a cystic neck mass from a SCC then we would perform a level II-IV neck dissection. If this is a cystic neck mass from a thyroid cancer then we would perform a level II-V neck dissection. She would then need management of the primary site.    We discussed the surgery and the risks. If this is just excision of the cyst she can go home the same day. If she has a neck dissection she would be admitted. Either way she would have a LINH drain in place which she would be discharged with (2 if thyroid neck dissection). We discussed the risks of scarring, bleeding, infection, damage to surrounding structures. We discussed the risks to the marginal mandibular nerve, spinal accessory nerve, hypoglossal nerve, phrenic nerve, vagus nerve. We discussed that more dissection would be required if a level V neck dissection were performed.     We will work on finding a date for surgery once we have all the final results of the workup.      Thank you very much for the opportunity to participate in the care of your patient.      Radha Flores MD, M.D.  Otolaryngology- Head & Neck Surgery      This note was dictated with voice recognition software and then edited. Please excuse any unintentional errors.

## 2021-10-29 ENCOUNTER — PATIENT OUTREACH (OUTPATIENT)
Dept: OTOLARYNGOLOGY | Facility: CLINIC | Age: 66
End: 2021-10-29

## 2021-10-29 NOTE — PROGRESS NOTES
Called and spoke with patient with the following thyroid US results:      Impression:  Heterogeneous thyroid gland with multiple nodules that do not meet  criteria for FNA or follow-up.     ACR TI-RADS recommendations  TR2 (2 points) & TR1 (0 points) -No FNA or follow-up  TR3 (3 points) - FNA if ? 2.5cm, follow-up if 1.5 -2.4 cm in 1, 3 and  5 years  TR4 (4-6 points) - FNA if ? 1.5cm, follow-up if 1 -1.4 cm in 1, 2, 3  and 5 years  TR5 (?7 points) - FNA if ? 1cm, follow-up if 0.5 -0.9 cm every year  for 5 years     Reviewed with patient Dr. Flores would like to proceed with neck mass excision with possible neck dissection pending findings. Patient in agreement. Reviewed with patient that she will need pre-op physical and pre-op covid testing within 4 days of procedure. We will update patient with date as soon as surgery date is confirmed. Patient verbalized understanding and encouraged to call with further questions or concerns.       Ashly Patricia, RN, BSN

## 2021-11-02 ENCOUNTER — HOSPITAL ENCOUNTER (OUTPATIENT)
Facility: CLINIC | Age: 66
End: 2021-11-02
Attending: OTOLARYNGOLOGY | Admitting: OTOLARYNGOLOGY
Payer: COMMERCIAL

## 2021-11-02 DIAGNOSIS — I10 ESSENTIAL HYPERTENSION, BENIGN: ICD-10-CM

## 2021-11-02 DIAGNOSIS — F32.0 MAJOR DEPRESSIVE DISORDER, SINGLE EPISODE, MILD (H): ICD-10-CM

## 2021-11-02 DIAGNOSIS — Z11.59 ENCOUNTER FOR SCREENING FOR OTHER VIRAL DISEASES: ICD-10-CM

## 2021-11-02 DIAGNOSIS — F41.1 GENERALIZED ANXIETY DISORDER: ICD-10-CM

## 2021-11-02 DIAGNOSIS — F41.9 ANXIETY: ICD-10-CM

## 2021-11-03 ENCOUNTER — TELEPHONE (OUTPATIENT)
Dept: OTOLARYNGOLOGY | Facility: CLINIC | Age: 66
End: 2021-11-03

## 2021-11-03 RX ORDER — DEXAMETHASONE SODIUM PHOSPHATE 4 MG/ML
10 INJECTION, SOLUTION INTRA-ARTICULAR; INTRALESIONAL; INTRAMUSCULAR; INTRAVENOUS; SOFT TISSUE ONCE
Status: CANCELLED | OUTPATIENT
Start: 2021-11-03 | End: 2021-11-03

## 2021-11-03 RX ORDER — CLINDAMYCIN PHOSPHATE 900 MG/50ML
900 INJECTION, SOLUTION INTRAVENOUS SEE ADMIN INSTRUCTIONS
Status: CANCELLED | OUTPATIENT
Start: 2021-11-03

## 2021-11-03 RX ORDER — CLINDAMYCIN PHOSPHATE 900 MG/50ML
900 INJECTION, SOLUTION INTRAVENOUS
Status: CANCELLED | OUTPATIENT
Start: 2021-11-03

## 2021-11-03 NOTE — TELEPHONE ENCOUNTER
Called patient on [phone] and left a voicemail to confirm surgery. Provided call back number to ENT.

## 2021-11-03 NOTE — TELEPHONE ENCOUNTER
Patient called back and confirmed that surgery is on 11/8 and her pre-op physical and covid test is on 11/5. Patient is aware she will receive a phone call from pre-op nursing to go over when to stop eating/drinking. Patient understood and had no further questions. Surgery packet in the mail.

## 2021-11-04 ENCOUNTER — PATIENT OUTREACH (OUTPATIENT)
Dept: OTOLARYNGOLOGY | Facility: CLINIC | Age: 66
End: 2021-11-04

## 2021-11-04 DIAGNOSIS — R22.1 MASS OF LEFT SIDE OF NECK: Primary | ICD-10-CM

## 2021-11-04 NOTE — TELEPHONE ENCOUNTER
Called and spoke with patient with update that Dr. Flores would like to get an updated CT neck prior to surgery. Patient in agreement. CT scan scheduled for tomorrow 11/5 at 1:45pm At [facility].  Patient in agreement with plan and was encouraged to call with further questions or concerns.     Ashly Patricia RN, BSN

## 2021-11-05 ENCOUNTER — HOSPITAL ENCOUNTER (OUTPATIENT)
Dept: CT IMAGING | Facility: CLINIC | Age: 66
Discharge: HOME | End: 2021-11-05
Attending: OTOLARYNGOLOGY | Admitting: OTOLARYNGOLOGY
Payer: COMMERCIAL

## 2021-11-05 ENCOUNTER — OFFICE VISIT (OUTPATIENT)
Dept: FAMILY MEDICINE | Facility: CLINIC | Age: 66
End: 2021-11-05
Payer: COMMERCIAL

## 2021-11-05 ENCOUNTER — TELEPHONE (OUTPATIENT)
Dept: OTOLARYNGOLOGY | Facility: CLINIC | Age: 66
End: 2021-11-05

## 2021-11-05 VITALS
WEIGHT: 249 LBS | OXYGEN SATURATION: 99 % | DIASTOLIC BLOOD PRESSURE: 66 MMHG | SYSTOLIC BLOOD PRESSURE: 122 MMHG | TEMPERATURE: 98.2 F | HEART RATE: 55 BPM | RESPIRATION RATE: 24 BRPM | BODY MASS INDEX: 39 KG/M2

## 2021-11-05 DIAGNOSIS — R22.1 MASS OF LEFT SIDE OF NECK: ICD-10-CM

## 2021-11-05 DIAGNOSIS — F32.0 MAJOR DEPRESSIVE DISORDER, SINGLE EPISODE, MILD (H): ICD-10-CM

## 2021-11-05 DIAGNOSIS — I10 ESSENTIAL HYPERTENSION: ICD-10-CM

## 2021-11-05 DIAGNOSIS — Z11.59 ENCOUNTER FOR SCREENING FOR OTHER VIRAL DISEASES: ICD-10-CM

## 2021-11-05 DIAGNOSIS — Z01.818 PREOP GENERAL PHYSICAL EXAM: Primary | ICD-10-CM

## 2021-11-05 DIAGNOSIS — F41.1 GENERALIZED ANXIETY DISORDER: ICD-10-CM

## 2021-11-05 LAB
ANION GAP SERPL CALCULATED.3IONS-SCNC: 6 MMOL/L (ref 3–14)
BUN SERPL-MCNC: 20 MG/DL (ref 7–30)
CALCIUM SERPL-MCNC: 9.6 MG/DL (ref 8.5–10.1)
CHLORIDE BLD-SCNC: 108 MMOL/L (ref 94–109)
CO2 SERPL-SCNC: 27 MMOL/L (ref 20–32)
CREAT SERPL-MCNC: 0.82 MG/DL (ref 0.52–1.04)
GFR SERPL CREATININE-BSD FRML MDRD: 75 ML/MIN/1.73M2
GLUCOSE BLD-MCNC: 76 MG/DL (ref 70–99)
HOLD SPECIMEN: NORMAL
POTASSIUM BLD-SCNC: 4.3 MMOL/L (ref 3.4–5.3)
SODIUM SERPL-SCNC: 141 MMOL/L (ref 133–144)

## 2021-11-05 PROCEDURE — 250N000009 HC RX 250: Performed by: OTOLARYNGOLOGY

## 2021-11-05 PROCEDURE — 250N000011 HC RX IP 250 OP 636: Performed by: OTOLARYNGOLOGY

## 2021-11-05 PROCEDURE — 70491 CT SOFT TISSUE NECK W/DYE: CPT

## 2021-11-05 PROCEDURE — 99214 OFFICE O/P EST MOD 30 MIN: CPT | Performed by: NURSE PRACTITIONER

## 2021-11-05 PROCEDURE — 36415 COLL VENOUS BLD VENIPUNCTURE: CPT | Performed by: NURSE PRACTITIONER

## 2021-11-05 PROCEDURE — 86800 THYROGLOBULIN ANTIBODY: CPT | Mod: 90 | Performed by: NURSE PRACTITIONER

## 2021-11-05 PROCEDURE — U0005 INFEC AGEN DETEC AMPLI PROBE: HCPCS | Performed by: NURSE PRACTITIONER

## 2021-11-05 PROCEDURE — 84432 ASSAY OF THYROGLOBULIN: CPT | Mod: 90 | Performed by: NURSE PRACTITIONER

## 2021-11-05 PROCEDURE — U0003 INFECTIOUS AGENT DETECTION BY NUCLEIC ACID (DNA OR RNA); SEVERE ACUTE RESPIRATORY SYNDROME CORONAVIRUS 2 (SARS-COV-2) (CORONAVIRUS DISEASE [COVID-19]), AMPLIFIED PROBE TECHNIQUE, MAKING USE OF HIGH THROUGHPUT TECHNOLOGIES AS DESCRIBED BY CMS-2020-01-R: HCPCS | Performed by: NURSE PRACTITIONER

## 2021-11-05 PROCEDURE — 99000 SPECIMEN HANDLING OFFICE-LAB: CPT | Performed by: NURSE PRACTITIONER

## 2021-11-05 PROCEDURE — 80048 BASIC METABOLIC PNL TOTAL CA: CPT | Performed by: NURSE PRACTITIONER

## 2021-11-05 RX ORDER — ESCITALOPRAM OXALATE 20 MG/1
20 TABLET ORAL DAILY
Qty: 90 TABLET | Refills: 3 | Status: SHIPPED | OUTPATIENT
Start: 2021-11-05 | End: 2022-12-09

## 2021-11-05 RX ORDER — AMLODIPINE BESYLATE 5 MG/1
TABLET ORAL
Qty: 135 TABLET | Refills: 1 | OUTPATIENT
Start: 2021-11-05

## 2021-11-05 RX ORDER — ESCITALOPRAM OXALATE 20 MG/1
TABLET ORAL
Qty: 90 TABLET | Refills: 1 | OUTPATIENT
Start: 2021-11-05

## 2021-11-05 RX ORDER — AMLODIPINE BESYLATE 5 MG/1
7.5 TABLET ORAL DAILY
Qty: 135 TABLET | Refills: 1 | Status: SHIPPED | OUTPATIENT
Start: 2021-11-05 | End: 2022-03-30

## 2021-11-05 RX ORDER — BUPROPION HYDROCHLORIDE 300 MG/1
TABLET ORAL
Qty: 90 TABLET | Refills: 1 | OUTPATIENT
Start: 2021-11-05

## 2021-11-05 RX ORDER — BUPROPION HYDROCHLORIDE 300 MG/1
300 TABLET ORAL EVERY MORNING
Qty: 90 TABLET | Refills: 3 | Status: SHIPPED | OUTPATIENT
Start: 2021-11-05 | End: 2022-12-09

## 2021-11-05 RX ORDER — IOPAMIDOL 755 MG/ML
80 INJECTION, SOLUTION INTRAVASCULAR ONCE
Status: COMPLETED | OUTPATIENT
Start: 2021-11-05 | End: 2021-11-05

## 2021-11-05 RX ADMIN — IOPAMIDOL 80 ML: 755 INJECTION, SOLUTION INTRAVENOUS at 13:45

## 2021-11-05 RX ADMIN — SODIUM CHLORIDE 80 ML: 9 INJECTION, SOLUTION INTRAVENOUS at 13:45

## 2021-11-05 ASSESSMENT — PATIENT HEALTH QUESTIONNAIRE - PHQ9
5. POOR APPETITE OR OVEREATING: NOT AT ALL
SUM OF ALL RESPONSES TO PHQ QUESTIONS 1-9: 3

## 2021-11-05 ASSESSMENT — ANXIETY QUESTIONNAIRES
5. BEING SO RESTLESS THAT IT IS HARD TO SIT STILL: NOT AT ALL
GAD7 TOTAL SCORE: 0
2. NOT BEING ABLE TO STOP OR CONTROL WORRYING: NOT AT ALL
6. BECOMING EASILY ANNOYED OR IRRITABLE: NOT AT ALL
7. FEELING AFRAID AS IF SOMETHING AWFUL MIGHT HAPPEN: NOT AT ALL
1. FEELING NERVOUS, ANXIOUS, OR ON EDGE: NOT AT ALL
3. WORRYING TOO MUCH ABOUT DIFFERENT THINGS: NOT AT ALL

## 2021-11-05 NOTE — TELEPHONE ENCOUNTER
Called patient to discuss CT scan results. This shows significant resolution of the previous cystic neck mass. There is not any significant residual cyst. I discussed with her that I am concerned that with operating on Monday we may not find anything to remove and just leave her with scar tissue which would complicate later surgery if disease recurs. She says that her neck is no longer draining and her symptoms have largely resolved. She is comfortable with the plan for cancelling surgery. We will tentatively see her in clinic in 6 weeks for a recheck (msg sent to schedulers) and then plan on a CT neck in about 3 months to ensure no recurrence. She knows to contact us if the symptoms recur and we can certainly see her sooner or revisit the plans to have surgery.     Radha Flores MD    Department of Otolaryngology

## 2021-11-05 NOTE — PROGRESS NOTES
[facility]  [address] [place] [id number]  Phone: [phone]  Fax: [phone]  Primary Provider: Annika Colbert  Pre-op Performing Provider: ANNIKA COLBERT      PREOPERATIVE EVALUATION:  Today's date: 11/5/2021    Donita Lerner is a 65 year old female who presents for a preoperative evaluation.    Surgical Information:  Surgery/Procedure: Excision of neck mass, possible completion neck dissection (left)  Surgery Location: [place] [address]  Surgeon: Radha Flores MD  Surgery Date: 11/8/2021  Time of Surgery: 2:55 pm  Where patient plans to recover: At home with family  Fax number for surgical facility: Note does not need to be faxed, will be available electronically in Epic.    Type of Anesthesia Anticipated: General    Assessment & Plan     The proposed surgical procedure is considered INTERMEDIATE risk.    Preop general physical exam      Mass of left side of neck  Relatively new, recurrent mass of the left neck.  Treated with antibiotics x2 which has improved after.  Still has minor lump there.  Has CT scan set up for this afternoon and surgeon will determine whether they will proceed with surgery or not.  - Thyroglobulin and Antibody (Sendout to [facility])    Major depressive disorder, single episode, mild (H)  Chronic, stable.  No changes.  - escitalopram (LEXAPRO) 20 MG tablet; Take 1 tablet (20 mg) by mouth daily  - buPROPion (WELLBUTRIN XL) 300 MG 24 hr tablet; Take 1 tablet (300 mg) by mouth every morning DO NOT CRUSH.    Generalized anxiety disorder  Chronic, stable.  No changes.  - escitalopram (LEXAPRO) 20 MG tablet; Take 1 tablet (20 mg) by mouth daily  - buPROPion (WELLBUTRIN XL) 300 MG 24 hr tablet; Take 1 tablet (300 mg) by mouth every morning DO NOT CRUSH.  - Basic metabolic panel  (Ca, Cl, CO2, Creat, Gluc, K, Na, BUN); Future  - Basic metabolic panel  (Ca, Cl, CO2, Creat, Gluc, K, Na, BUN)    Essential hypertension  Chronic, blood pressure below  goal in office.  Doing well.  No changes.  - amLODIPine (NORVASC) 5 MG tablet; Take 1.5 tablets (7.5 mg) by mouth daily    Encounter for screening for other viral diseases    - Asymptomatic COVID-19 Virus (Coronavirus) by PCR Nose         Risks and Recommendations:  The patient has the following additional risks and recommendations for perioperative complications:   - No identified additional risk factors other than previously addressed    Medication Instructions:  Patient is to take all scheduled medications on the day of surgery    RECOMMENDATION:  APPROVAL GIVEN to proceed with proposed procedure, without further diagnostic evaluation.            Subjective     HPI related to upcoming procedure: Patient developed a mass on left neck in early September, followed by ENT.  Has been treated with antibiotics twice which has improved symptoms.  Currently scheduled for surgery on Monday, however ENT would like to do a repeat CT scan to further evaluate. Has CT scan this afternoon.        Preop Questions 11/3/2021   1. Have you ever had a heart attack or stroke? YES - stroke   2. Have you ever had surgery on your heart or blood vessels, such as a stent placement, a coronary artery bypass, or surgery on an artery in your head, neck, heart, or legs? No   3. Do you have chest pain with activity? No   4. Do you have a history of  heart failure? No   5. Do you currently have a cold, bronchitis or symptoms of other infection? No   6. Do you have a cough, shortness of breath, or wheezing? No   7. Do you or anyone in your family have previous history of blood clots? No   8. Do you or does anyone in your family have a serious bleeding problem such as prolonged bleeding following surgeries or cuts? No   9. Have you ever had problems with anemia or been told to take iron pills? No   10. Have you had any abnormal blood loss such as black, tarry or bloody stools, or abnormal vaginal bleeding? No   11. Have you ever had a blood  transfusion? No   12. Are you willing to have a blood transfusion if it is medically needed before, during, or after your surgery? Yes   13. Have you or any of your relatives ever had problems with anesthesia? No   14. Do you have sleep apnea, excessive snoring or daytime drowsiness? No   15. Do you have any artifical heart valves or other implanted medical devices like a pacemaker, defibrillator, or continuous glucose monitor? No   16. Do you have artificial joints? YES - both knees   17. Are you allergic to latex? No       Health Care Directive:  Patient does not have a Health Care Directive or Living Will: Advance Directive received and scanned. Click on Code in the patient header to view.    Preoperative Review of :   reviewed - no record of controlled substances prescribed.      Status of Chronic Conditions:  DEPRESSION - Patient has a long history of Depression of moderate severity requiring medication for control with recent symptoms being stable..Current symptoms of depression include none.     HYPERTENSION - Patient has longstanding history of HTN , currently denies any symptoms referable to elevated blood pressure. Specifically denies chest pain, palpitations, dyspnea, orthopnea, PND or peripheral edema. Blood pressure readings have been in normal range. Current medication regimen is as listed below. Patient denies any side effects of medication.       Review of Systems  Constitutional, neuro, ENT, endocrine, pulmonary, cardiac, gastrointestinal, genitourinary, musculoskeletal, integument and psychiatric systems are negative, except as otherwise noted.    Patient Active Problem List    Diagnosis Date Noted     Neck abscess 09/08/2021     Priority: Medium     S/P laparoscopic cholecystectomy 05/11/2018     Priority: Medium     Calculus of gallbladder without cholecystitis 04/30/2018     Priority: Medium     Osteoporosis 11/10/2016     Priority: Medium     Seasonal affective disorder (H) 11/10/2016      Priority: Medium     S/P left unicompartmental knee replacement 2016     Priority: Medium     Major depressive disorder, single episode, mild (H) 2016     Priority: Medium      obesity, unspecified obesity type (HCC) 2016     Priority: Medium     Essential hypertension 2016     Priority: Medium     Quality         Abnormal MRI of head 2015     Priority: Medium     Possible stroke noted on MRI done in  for menieres        Advanced care planning/counseling discussion 2013     Priority: Medium     S/P total knee arthroplasty 2013     Priority: Medium     CNS DISORDER -gliosis on mri 2005     Priority: Medium     Sensorineural hearing loss 2005     Priority: Medium     Problem list name updated by automated process. Provider to review       Generalized anxiety disorder 2003     Priority: Medium     Symptomatic menopausal or female climacteric states 2003     Priority: Medium      Past Medical History:   Diagnosis Date     Arthritis      Depressive disorder, not elsewhere classified      Generalized anxiety disorder      Hypertension     No cardiologist     Meniere's disease 2005     Problem list name updated by automated process. Provider to review     Meniere's disease, unspecified      Sensorineural hearing loss, unspecified     left ear since birth complete     Unspecified episodic mood disorder     depression worsens winter SAD     Past Surgical History:   Procedure Laterality Date     ADENOIDECTOMY  1967     ARTHROPLASTY KNEE  2013    Procedure: ARTHROPLASTY KNEE;  Right Total Knee Arthroplasty  ;  Surgeon: Ganesh Sheikh MD;  Location:  OR     ARTHROPLASTY KNEE UNICOMPARTMENT Left 2016    Procedure: ARTHROPLASTY KNEE UNICOMPARTMENT;  Surgeon: Ganesh Sheikh MD;  Location:  OR     C ANESTH, SECTION      x3     IR LYMPH NODE BIOPSY  10/14/2021     LAPAROSCOPIC CHOLECYSTECTOMY WITH CHOLANGIOGRAMS N/A  5/11/2018    Procedure: LAPAROSCOPIC CHOLECYSTECTOMY WITH CHOLANGIOGRAMS;  Laparoscopic Cholecystectomy with Intraoperative Cholangiogram;  Surgeon: Erick Rivera MD;  Location: [facility] OR     TONSILLECTOMY  1967     Current Outpatient Medications   Medication Sig Dispense Refill     amLODIPine (NORVASC) 5 MG tablet Take 1.5 tablets (7.5 mg) by mouth daily 135 tablet 1     buPROPion (WELLBUTRIN XL) 300 MG 24 hr tablet Take 1 tablet (300 mg) by mouth every morning DO NOT CRUSH. 90 tablet 3     Cholecalciferol (VITAMIN D) 2000 UNITS tablet Take 2,000 Units by mouth daily 100 tablet 3     escitalopram (LEXAPRO) 20 MG tablet Take 1 tablet (20 mg) by mouth daily 90 tablet 3     ibuprofen (ADVIL/MOTRIN) 200 MG tablet Take 600 mg by mouth every 4 hours as needed for mild pain       metoprolol succinate ER (TOPROL-XL) 25 MG 24 hr tablet TAKE 1 TABLET DAILY 90 tablet 3     order for DME Equipment being ordered: LILA compression stockings 1 Units 0       Allergies   Allergen Reactions     Penicillins Anaphylaxis and Swelling     Lisinopril Cough     Sulfa Drugs Rash        Social History     Tobacco Use     Smoking status: Never Smoker     Smokeless tobacco: Never Used   Substance Use Topics     Alcohol use: No     Family History   Problem Relation Age of Onset     Cancer Mother         Lymphoma     Depression Mother      Cancer Father         Carcinoma Mesothelioma ([place])     Psychotic Disorder Daughter         borderline personality disorder     Depression Brother      History   Drug Use No         Objective     /66   Pulse 55   Temp 98.2  F (36.8  C)   Resp 24   Wt 112.9 kg (249 lb)   LMP 07/01/2002   SpO2 99%   Breastfeeding No   BMI 39.00 kg/m      Physical Exam    GENERAL APPEARANCE: healthy, alert and no distress     HENT: ear canals and TM's normal and nose and mouth without ulcers or lesions     NECK: no adenopathy, no asymmetry, or scars and thyroid normal to palpation, 2 cm lump noted on left  posterior neck without erythema, warmth or swelling      RESP: lungs clear to auscultation - no rales, rhonchi or wheezes     CV: regular rates and rhythm, normal S1 S2, no S3 or S4 and no murmur, click or rub     ABDOMEN:  soft, nontender, no HSM or masses and bowel sounds normal     MS: extremities normal- no gross deformities noted, no evidence of inflammation in joints, FROM in all extremities.     SKIN: no suspicious lesions or rashes     NEURO: Normal strength and tone, sensory exam grossly normal, mentation intact and speech normal     PSYCH: mentation appears normal. and affect normal/bright     LYMPHATICS: No cervical adenopathy    Recent Labs   Lab Test 10/14/21  0847 10/14/21  0846 09/10/21  0741 09/09/21  1454 09/09/21  0659 09/09/21  0659   HGB 13.9  --  13.1  --    < > 13.8     --  172  --    < > 187   INR  --  1.08  --   --   --   --    NA  --   --  141  --   --  137   POTASSIUM  --   --  3.4 3.6   < > 3.0*   CR  --   --  0.76  --   --  0.86    < > = values in this interval not displayed.        Diagnostics:  Labs pending at this time.  Results will be reviewed when available.  Recent Results (from the past 720 hour(s))   Asymptomatic COVID-19 Virus (Coronavirus) by PCR Nose    Collection Time: 10/10/21 12:50 PM    Specimen: Nose; Swab   Result Value Ref Range    SARS CoV2 PCR Negative Negative   INR    Collection Time: 10/14/21  8:46 AM   Result Value Ref Range    INR 1.08 0.85 - 1.15   Partial thromboplastin time    Collection Time: 10/14/21  8:46 AM   Result Value Ref Range    aPTT 30 22 - 38 Seconds   CBC with platelets    Collection Time: 10/14/21  8:47 AM   Result Value Ref Range    WBC Count 6.2 4.0 - 11.0 10e3/uL    RBC Count 4.64 3.80 - 5.20 10e6/uL    Hemoglobin 13.9 11.7 - 15.7 g/dL    Hematocrit 43.3 35.0 - 47.0 %    MCV 93 78 - 100 fL    MCH 30.0 26.5 - 33.0 pg    MCHC 32.1 31.5 - 36.5 g/dL    RDW 13.8 10.0 - 15.0 %    Platelet Count 215 150 - 450 10e3/uL   Flow Cytometry Tissue     Collection Time: 10/14/21 11:01 AM    Specimen: Lymph Node(s), Neck, Left; Tissue   Result Value Ref Range    Case Report       Flow Cytometry Report                             Case: [id number]                                  Authorizing Provider:  Radha Flores MD     Collected:           10/14/2021 11:01 AM          Ordering Location:     [facility]     Received:            10/14/2021 11:38 AM                                 Interventional Radiology                                                     Pathologist:           Donna Swann MD                                                           Specimen:    Lymph Node(s), Neck, Left                                                                  Flow Interpretation       A. Lymph Node(s), Neck, Left:  -Polytypic B cells  -No aberrant immunophenotype on T cells  See comment          Comment       There is no immunophenotypic evidence of non-Hodgkin lymphoma. Hodgkin lymphoma cannot be excluded by flow cytometry. T cell lymphomas cannot always be detected by this flow cytometry assay. Neoplastic cells, including large cells, may not survive specimen processing. This sample may not be representative. Final interpretation requires correlation with morphologic and clinical features.         Flow Phenotypic Data       60% of total events are CD45 positive and are viable by 7-AAD. A low percentage may be caused by low viability and/or a low number of CD45 positive cells. Of the CD45 positive leukocytes, 68% are viable by 7-AAD.      Unless otherwise indicated, percentages reported below are based on the total number of CD45 positive viable leukocytes. If applicable, percentage of plasma cells is from total viable nucleated cells.    0.4% polytypic B cells  58% T cells with a CD4:CD8 ratio of 2.3:1.   A subset of the T cells (6% of leukocytes) express CD8 and CD57, which could represent large granular lymphocytes, or other reactive T cell  subsets.  9% NK cells, with forward scatter similar to T cells        Flow Processing Information       Multi-color flow analysis is performed for the following markers: CD2, CD3, CD4, CD5, CD7, CD8, CD10, CD16, CD19, CD20, CD34, CD38, CD45, CD56, CD57, and kappa and lambda immunoglobulin light chains. Cells are gated to isolate populations (CD45 versus side scatter and forward scatter versus side scatter), to exclude debris (forward scatter versus side scatter) and to exclude cell doublets (forward scatter height versus forward scatter width and side scatter height versus side scatter width). Forward scatter varies with cell size. Side scatter varies with the amount of cytoplasmic granules. Intensity for CD45 usually increases as hematolymphoid cells mature.        Clinical Information       65 year old female with neck mass and lymphadenopathy left neck improved but still present after antibiotic therapy      FDA Disclaimer       This test was developed and its performance characteristics determined by the [facility]. It has not been cleared or approved by the US Food and Drug Administration.  FDA does not require this test to go through premarket FDA review. This test is used for clinical purposes and should not be regarded as investigational or for research. This laboratory is certified under the Clinical Laboratory Improvement Amendments (CLIA) as qualified to perform high complexity clinical laboratory testing.      Performing Labs       The technical component of this testing was completed at [facility]     Anaerobic bacterial culture    Collection Time: 10/14/21 11:02 AM    Specimen: Neck, Left; Aspirate   Result Value Ref Range    Culture No anaerobic organisms isolated    Fine needle aspiration    Collection Time: 10/14/21 11:03 AM   Result Value Ref Range    Final Diagnosis        Specimen A     Interpretation:      Negative for malignancy     Other Findings:      Features suggestive of a granuloma, Acute inflammation present.     Adequacy:     Satisfactory for evaluation          Comment       Special stains for GMS and AFB are performed on the cell block of the specimen with appropriate controls and are negative for fungal and acid fast organism.      Clinical Information       CT 9/22/21 notes a left cystic lesion to the left sternocleidomastoid muscle body in the left cervical triangle now 1.7 x 1.7 cm previously 2.7 x 2.2 cm.      Rapid Onsite Evaluation       FNA Performance:   Fine needle aspiration was not performed by [facility] staff.    Aspirate immediate study/adequacy:  ERON GRIMALDO , attest that I immediately examined smears while the procedure was underway and determined or confirmed the adequacy of the specimens via telepathology.    It is of note that the final assessment and report may be performed and signed by a different pathologist.    Onsite adequacy/interpretation:  A: adequate          Gross Description       A. Lymph Node(s), Neck, Left, Left neck mass, Fine Needle Aspirate:  Received are 2 fixed slides, processed for Pap stain, 2 air dried slides, processed for Diff Quik stain, and material in formalin, processed for one hematoxylin stained cell block. RPMI sent directly to FLOW. Culture sent.               Abnormal Result? Yes (A) No    Performing Labs       The technical component of this testing was completed at [facility]     Gram stain    Collection Time: 10/14/21 11:03 AM    Specimen: Neck, Left; Fine Needle Aspiration   Result Value Ref Range    Gram Stain Result 1+ Gram positive cocci in pairs and chains (A)     Gram Stain Result 2+ WBC seen (A)    Body fluid, unsp Aerobic Bacterial Culture Routine    Collection Time: 10/14/21 11:06 AM    Specimen: Neck, Left; Body fluid, unsp    Result Value Ref Range    Culture No Growth    Basic metabolic panel  (Ca, Cl, CO2, Creat, Gluc, K, Na, BUN)    Collection Time: 11/05/21 10:30 AM   Result Value Ref Range    Sodium 141 133 - 144 mmol/L    Potassium 4.3 3.4 - 5.3 mmol/L    Chloride 108 94 - 109 mmol/L    Carbon Dioxide (CO2) 27 20 - 32 mmol/L    Anion Gap 6 3 - 14 mmol/L    Urea Nitrogen 20 7 - 30 mg/dL    Creatinine 0.82 0.52 - 1.04 mg/dL    Calcium 9.6 8.5 - 10.1 mg/dL    Glucose 76 70 - 99 mg/dL    GFR Estimate 75 >60 mL/min/1.73m2   Extra Purple Top Tube    Collection Time: 11/05/21 10:37 AM   Result Value Ref Range    Hold Specimen JIC       No EKG required for low risk surgery (cataract, skin procedure, breast biopsy, etc).    Revised Cardiac Risk Index (RCRI):  The patient has the following serious cardiovascular risks for perioperative complications:   - No serious cardiac risks = 0 points     RCRI Interpretation: 0 points: Class I (very low risk - 0.4% complication rate)           Signed Electronically by: TERRA Ku CNP  Copy of this evaluation report is provided to requesting physician.

## 2021-11-05 NOTE — PATIENT INSTRUCTIONS

## 2021-11-06 LAB — SARS-COV-2 RNA RESP QL NAA+PROBE: NEGATIVE

## 2021-11-06 ASSESSMENT — ANXIETY QUESTIONNAIRES: GAD7 TOTAL SCORE: 0

## 2021-11-12 LAB — SCANNED LAB RESULT: NORMAL

## 2022-01-09 ENCOUNTER — OFFICE VISIT (OUTPATIENT)
Dept: URGENT CARE | Facility: URGENT CARE | Age: 67
End: 2022-01-09
Payer: COMMERCIAL

## 2022-01-09 VITALS
HEART RATE: 87 BPM | SYSTOLIC BLOOD PRESSURE: 124 MMHG | DIASTOLIC BLOOD PRESSURE: 72 MMHG | RESPIRATION RATE: 16 BRPM | TEMPERATURE: 98.8 F | OXYGEN SATURATION: 96 %

## 2022-01-09 DIAGNOSIS — J06.9 URI WITH COUGH AND CONGESTION: ICD-10-CM

## 2022-01-09 DIAGNOSIS — R07.0 THROAT PAIN: Primary | ICD-10-CM

## 2022-01-09 LAB
DEPRECATED S PYO AG THROAT QL EIA: NEGATIVE
GROUP A STREP BY PCR: NOT DETECTED

## 2022-01-09 PROCEDURE — U0003 INFECTIOUS AGENT DETECTION BY NUCLEIC ACID (DNA OR RNA); SEVERE ACUTE RESPIRATORY SYNDROME CORONAVIRUS 2 (SARS-COV-2) (CORONAVIRUS DISEASE [COVID-19]), AMPLIFIED PROBE TECHNIQUE, MAKING USE OF HIGH THROUGHPUT TECHNOLOGIES AS DESCRIBED BY CMS-2020-01-R: HCPCS | Performed by: FAMILY MEDICINE

## 2022-01-09 PROCEDURE — U0005 INFEC AGEN DETEC AMPLI PROBE: HCPCS | Performed by: FAMILY MEDICINE

## 2022-01-09 PROCEDURE — 99214 OFFICE O/P EST MOD 30 MIN: CPT | Performed by: FAMILY MEDICINE

## 2022-01-09 PROCEDURE — 87651 STREP A DNA AMP PROBE: CPT | Performed by: FAMILY MEDICINE

## 2022-01-09 RX ORDER — BENZONATATE 100 MG/1
100 CAPSULE ORAL 3 TIMES DAILY PRN
Qty: 30 CAPSULE | Refills: 1 | Status: SHIPPED | OUTPATIENT
Start: 2022-01-09 | End: 2022-02-23

## 2022-01-09 RX ORDER — ALBUTEROL SULFATE 90 UG/1
2 AEROSOL, METERED RESPIRATORY (INHALATION) EVERY 4 HOURS PRN
Qty: 18 G | Refills: 0 | Status: SHIPPED | OUTPATIENT
Start: 2022-01-09 | End: 2022-02-23

## 2022-01-09 NOTE — PATIENT INSTRUCTIONS
Bronchitis is an inflammation in the air passages in your lungs.  It is similar to the processes that occur in people with asthma.  Bronchitis is usually caused by viruses and, sometimes certain bacteria.  Antibiotics don't help the vast majority of people who have bronchitis recover any quicker even when it is caused by a bacteria.      For cough, dextromethorphan/guaifenesin combinations help loosen secretions and suppress cough safely without significant risk of sedation. Often an albuterol inhaler (2 puffs four times daily) will help with bronchitis. This is a prescription medicine.  If the cough is severe and interfering with sleep or function, prescription cough suppressants can be prescribed but these are often sedating.    The body needs to be treated well in order to help heal itself.  Rest as needed.  It is ok to reduce food intake if appetite is poor but it is quite important to maintain/increase fluid intake.    For nasal congestion and sinus pressure, pseudoephedrine (Sudafed) is often helpful but it can cause elevations in blood pressure and insomnia.  Short courses of a nasal decongestant spray (Afrin or Neosinephrine) can be appropriate but their use should be restricted to 3 days due to the high risk of nasal addiction.    For pain and fevers, acetaminophen (Tylenol) is most appropriate.  Ibuprofen (Advil) or naproxen (Aleve) are useful too and last longer but they can cause elevation of blood pressure or stomach problems.    Antihistamines (Benadryl, Dimetapp, etc.) cause sedation, confusion, bowel and urinary abnormalities and are of little use for infectious causes of cough and nasal congestion.  Their use should be reserved for allergic symptoms.

## 2022-01-09 NOTE — PROGRESS NOTES
SUBJECTIVE: Donita Lerner  is here today because of:Sore Throat and Cough  The patient has had symptoms of cough, sore throat and chest congestion.   Onset of symptoms was 3 weeks ago. Course of illness is improving.  Patient Does exposure to illness at home or work/school.   Patient denies fever, earache, mattering conjuntivitis on both sides, decreased appetite and decreased activity  Treatment measures tried include ibuprofen, .  Patient is not a smoker    Current Outpatient Medications   Medication Sig Dispense Refill     amLODIPine (NORVASC) 5 MG tablet Take 1.5 tablets (7.5 mg) by mouth daily 135 tablet 1     buPROPion (WELLBUTRIN XL) 300 MG 24 hr tablet Take 1 tablet (300 mg) by mouth every morning DO NOT CRUSH. 90 tablet 3     Cholecalciferol (VITAMIN D) 2000 UNITS tablet Take 2,000 Units by mouth daily 100 tablet 3     escitalopram (LEXAPRO) 20 MG tablet Take 1 tablet (20 mg) by mouth daily 90 tablet 3     ibuprofen (ADVIL/MOTRIN) 200 MG tablet Take 600 mg by mouth every 4 hours as needed for mild pain        metoprolol succinate ER (TOPROL-XL) 25 MG 24 hr tablet TAKE 1 TABLET DAILY 90 tablet 3     order for DME Equipment being ordered: LILA compression stockings 1 Units 0      Allergies   Allergen Reactions     Penicillins Anaphylaxis and Swelling     Lisinopril Cough     Sulfa Drugs Rash        OBJECTIVE:   Vital signs:/72 (BP Location: Right arm, Patient Position: Sitting, Cuff Size: Adult Regular)   Pulse 87   Temp 98.8  F (37.1  C) (Tympanic)   Resp 16   LMP 07/01/2002   SpO2 96%    General: healthy, alert and no distress  Skin is unremarkable.  HEENT: ENT exam normal, no neck nodes or sinus tenderness.  Lungs chest clear to IPPA, no tachypnea, retractions or cyanosis and S1, S2 normal, no murmur, no gallop, rate regular  Rapid Strep Test is performed    ASSESSMENT:   1) Viral upper respiratory illness.  1. Throat pain    - Streptococcus A Rapid Screen w/Reflex to PCR - Clinic Collect  -  Symptomatic; Yes; 12/26/2021 COVID-19 Virus (Coronavirus) by PCR Nose  - Group A Streptococcus PCR Throat Swab    2. URI with cough and congestion    - albuterol (PROAIR HFA/PROVENTIL HFA/VENTOLIN HFA) 108 (90 Base) MCG/ACT inhaler; Inhale 2 puffs into the lungs every 4 hours as needed for shortness of breath / dyspnea or wheezing  Dispense: 18 g; Refill: 0  - benzonatate (TESSALON) 100 MG capsule; Take 1 capsule (100 mg) by mouth 3 times daily as needed for cough  Dispense: 30 capsule; Refill: 1    PLAN:  Use acetaminophen, increase fluids and rest.   Follow up with any questions or problems  Nguyen Lujan M.D.

## 2022-01-10 LAB — SARS-COV-2 RNA RESP QL NAA+PROBE: NEGATIVE

## 2022-01-19 ENCOUNTER — MYC MEDICAL ADVICE (OUTPATIENT)
Dept: FAMILY MEDICINE | Facility: CLINIC | Age: 67
End: 2022-01-19
Payer: COMMERCIAL

## 2022-01-21 ENCOUNTER — ANCILLARY PROCEDURE (OUTPATIENT)
Dept: GENERAL RADIOLOGY | Facility: CLINIC | Age: 67
End: 2022-01-21
Attending: NURSE PRACTITIONER
Payer: COMMERCIAL

## 2022-01-21 ENCOUNTER — OFFICE VISIT (OUTPATIENT)
Dept: FAMILY MEDICINE | Facility: CLINIC | Age: 67
End: 2022-01-21
Payer: COMMERCIAL

## 2022-01-21 VITALS
WEIGHT: 247.6 LBS | SYSTOLIC BLOOD PRESSURE: 114 MMHG | BODY MASS INDEX: 38.86 KG/M2 | OXYGEN SATURATION: 95 % | RESPIRATION RATE: 16 BRPM | DIASTOLIC BLOOD PRESSURE: 76 MMHG | HEART RATE: 69 BPM | HEIGHT: 67 IN | TEMPERATURE: 97.2 F

## 2022-01-21 DIAGNOSIS — R06.02 SOB (SHORTNESS OF BREATH): ICD-10-CM

## 2022-01-21 DIAGNOSIS — R05.9 COUGH: ICD-10-CM

## 2022-01-21 DIAGNOSIS — S99.922A INJURY OF TOE ON LEFT FOOT, INITIAL ENCOUNTER: ICD-10-CM

## 2022-01-21 DIAGNOSIS — S92.912A: ICD-10-CM

## 2022-01-21 DIAGNOSIS — J06.9 VIRAL URI WITH COUGH: Primary | ICD-10-CM

## 2022-01-21 PROCEDURE — 71046 X-RAY EXAM CHEST 2 VIEWS: CPT | Performed by: RADIOLOGY

## 2022-01-21 PROCEDURE — 73660 X-RAY EXAM OF TOE(S): CPT | Mod: LT | Performed by: RADIOLOGY

## 2022-01-21 PROCEDURE — 99214 OFFICE O/P EST MOD 30 MIN: CPT | Performed by: NURSE PRACTITIONER

## 2022-01-21 RX ORDER — PREDNISONE 20 MG/1
20 TABLET ORAL 2 TIMES DAILY
Qty: 10 TABLET | Refills: 0 | Status: SHIPPED | OUTPATIENT
Start: 2022-01-21 | End: 2022-01-26

## 2022-01-21 ASSESSMENT — MIFFLIN-ST. JEOR: SCORE: 1695.86

## 2022-01-21 ASSESSMENT — PAIN SCALES - GENERAL: PAINLEVEL: SEVERE PAIN (6)

## 2022-01-21 NOTE — PROGRESS NOTES
"  Assessment & Plan     Viral URI with cough  Acute illness including cough, post nasal drainage, phlegm with mild nausea, accompanied by mild shortness of breath x 7 weeks. Has had negative COVID and strep testing. Is not improving with Mucinex, Tessalon and albuterol inhaler. Chest xray obtained in office, independently read by me - not noting any acute pulmonary process - final radiologist read negative as well. Likely viral, would recommend 5 day burst of prednisone and continue Mucinex, tessalon and albuterol. If not improving recommend follow up with provider via virtual visit.   - predniSONE (DELTASONE) 20 MG tablet; Take 1 tablet (20 mg) by mouth 2 times daily for 5 days    SOB (shortness of breath)  Shortness of breath accompanied by symptoms as above.   - XR Chest 2 Views; Future    Cough  Cough accompanied by symptoms as above.   - XR Chest 2 Views; Future    Displaced fracture of proximal phalanx of toe of left foot  Patient stubbed toe last evening on heel of other foot. Xray obtained in office independently read by this provider shows a slightly displaced fracture of the 4th proximal phalanx. Patient splinted/buddy taped and advised to wear post-op shoe. Recommend icing and ibuprofen as needed and follow up with ortho.   - Orthopedic  Referral; Future  - Ankle/Foot Bracing Supplies Order    Injury of toe on left foot, initial encounter  Injury of toe as above.   - XR Toe Left G/E 2 Views; Future  - Ankle/Foot Bracing Supplies Order             BMI:   Estimated body mass index is 38.77 kg/m  as calculated from the following:    Height as of this encounter: 1.702 m (5' 7.01\").    Weight as of this encounter: 112.3 kg (247 lb 9.6 oz).   Weight management plan: Not addressed today    See Patient Instructions    Return in about 1 week (around 1/28/2022), or if symptoms worsen or fail to improve for cough.    Annika Griffin, FELIPE, APRN-CNP   [facility]    Subjective   Danika " "is a 66 year old who presents for the following health issues:    HPI     Pt presents  Today with a cough for 7 weeks, SOB, nausea. Pt states it is getting worse. She coughs until she chokes.   Phlegm in chest   Some post nasal drainage - more so at beginning now just sitting in chest   Using Tessalon, Mucinex and albuterol inhaler - not much of help   Has also had acid reflux     Stubbed 4th left toe on the heel of right foot last night   Bruising present   Did jerzy tape     Review of Systems   Constitutional, HEENT, cardiovascular, pulmonary, gi and gu systems are negative, except as otherwise noted.      Objective    /76 (BP Location: Right arm, Patient Position: Sitting, Cuff Size: Adult Large)   Pulse 69   Temp 97.2  F (36.2  C) (Tympanic)   Resp 16   Ht 1.702 m (5' 7.01\")   Wt 112.3 kg (247 lb 9.6 oz)   LMP 07/01/2002   SpO2 95%   BMI 38.77 kg/m    Body mass index is 38.77 kg/m .  Physical Exam   GENERAL APPEARANCE: healthy, alert and no distress  HENT: ear canals and TM's normal and nose and mouth without ulcers or lesions  NECK: no adenopathy, no asymmetry, masses, or scars and thyroid normal to palpation  RESP: lungs clear to auscultation - no rales, rhonchi or wheezes, however diminished through out   CV: regular rates and rhythm, normal S1 S2, no S3 or S4 and no murmur, click or rub  ABDOMEN: soft, nontender, without hepatosplenomegaly or masses and bowel sounds normal  MS: extremities normal- no gross deformities noted, peripheral pulses normal and fourth digit on left foot with ecchymosis, tenderness and slightly decreased range of motion due to pain.  SKIN: no suspicious lesions or rashes  NEURO: Normal strength and tone, mentation intact and speech normal  PSYCH: mentation appears normal and affect normal/bright    Diagnostic Test Results:  CXR - independently reviewed by Annika Griffin, DNP, APRN-CNP - not noting any opacities or abnormalities to indicate infectious process- will " await final radiologist review.     Final:  XR CHEST 2 VW 1/21/2022 11:02 AM     HISTORY: SOB (shortness of breath); Cough     COMPARISON: 10/28/2007                                                                      IMPRESSION: No focal infiltrate, pleural effusion or pneumothorax. The  cardiac and mediastinal silhouettes are normal.     PRICILA MOSER MD       Xray -left fourth digit of foot - independently reviewed by Annika Griffin, DNP, APRN-CNP - noting a slightly displaced fracture of the proximal phalanx - will await final radiologist review.    Preliminary:  TOE LEFT TWO OR MORE VIEWS   1/21/2022 11:02 AM      HISTORY:  Injury of toe on left foot, initial encounter.     COMPARISON: None.                                                                      IMPRESSION: There is a fracture of the proximal phalanx of the fourth  toe. This involves the proximal metadiaphysis. There is up to 2 mm of  displacement and there is some apex plantar angulation. No articular  surface involvement is evident. Otherwise negative.     CHAPIS HAILE MD             DME (Durable Medical Equipment) Orders and Documentation  Orders Placed This Encounter   Procedures     Ankle/Foot Bracing Supplies Order      The patient was assessed and it was determined the patient is in need of the following listed DME Supplies/Equipment. Please complete supporting documentation below to demonstrate medical necessity.      Ankle/Foot Bracing Supplies Documentation  Patient requires the use of the ordered bracing device due to following medical need/condition: Toe fracture

## 2022-01-21 NOTE — PATIENT INSTRUCTIONS
Viral URI with cough  Acute illness including cough, post nasal drainage, phlegm with mild nausea, accompanied by mild shortness of breath x 7 weeks. Has had negative COVID and strep testing. Is not improving with Mucinex, Tessalon and albuterol inhaler. Chest xray obtained in office, independently read by me - not noting any acute pulmonary process - final radiologist read negative as well. Likely viral, would recommend 5 day burst of prednisone and continue Mucinex, tessalon and albuterol. If not improving recommend follow up with provider via virtual visit.   - predniSONE (DELTASONE) 20 MG tablet; Take 1 tablet (20 mg) by mouth 2 times daily for 5 days    SOB (shortness of breath)  Shortness of breath accompanied by symptoms as above.   - XR Chest 2 Views; Future    Cough  Cough accompanied by symptoms as above.   - XR Chest 2 Views; Future    Displaced fracture of proximal phalanx of toe of left foot  Patient stubbed toe last evening on heel of other foot. Xray obtained in office independently read by this provider shows a slightly displaced fracture of the 4th proximal phalanx. Patient splinted/buddy taped and advised to wear post-op shoe. Recommend icing and ibuprofen as needed and follow up with ortho.   - Orthopedic  Referral; Future  - Ankle/Foot Bracing Supplies Order    Injury of toe on left foot, initial encounter  Injury of toe as above.   - XR Toe Left G/E 2 Views; Future  - Ankle/Foot Bracing Supplies Order

## 2022-01-27 ENCOUNTER — OFFICE VISIT (OUTPATIENT)
Dept: PODIATRY | Facility: CLINIC | Age: 67
End: 2022-01-27
Payer: COMMERCIAL

## 2022-01-27 VITALS
HEART RATE: 49 BPM | SYSTOLIC BLOOD PRESSURE: 130 MMHG | BODY MASS INDEX: 38.77 KG/M2 | DIASTOLIC BLOOD PRESSURE: 77 MMHG | HEIGHT: 67 IN | WEIGHT: 247 LBS

## 2022-01-27 DIAGNOSIS — S92.502A CLOSED FRACTURE OF PHALANX OF LEFT FOURTH TOE, INITIAL ENCOUNTER: Primary | ICD-10-CM

## 2022-01-27 PROCEDURE — 99203 OFFICE O/P NEW LOW 30 MIN: CPT | Performed by: PODIATRIST

## 2022-01-27 ASSESSMENT — MIFFLIN-ST. JEOR: SCORE: 1693.16

## 2022-01-27 NOTE — PROGRESS NOTES
"PATIENT HISTORY:  Donita Lerner is a 66 year old female who presents to clinic with a chief complaint of a painful left foot.  The patient relates the pain is located on the fourth toe on the left foot.  The patient relates injuring the foot on 01/20/2022 while at home. Standing up and was undressing and heel went back hitting toe.  The patient was seen by Annika Griffin with x-rays revealing fracture of phalanx of left fourth toe.  The patient was offloaded with post-op shoe.  The patient was instructed to follow up in my clinic for further evaluation and treatment options.    Pertinent medical, surgical and family history include osteoporosis    Vitals: /77   Pulse (!) 49   Ht 1.702 m (5' 7.01\")   Wt 112 kg (247 lb)   LMP 07/01/2002   BMI 38.67 kg/m    BMI= Body mass index is 38.67 kg/m .    LOWER EXTREMITY PHYSICAL EXAM    Dermatologic: Skin is intact to left lower extremities without significant lesions, rash or abrasion.        Vascular: DP & PT pulses are intact & regular on the left.   CFT and skin temperature is normal to the left lower extremities.     Neurologic: Lower extremity sensation is intact to light touch.  No evidence of weakness in the left lower extremities.        Musculoskeletal: Patient is ambulatory without assistive device or brace.  No gross ankle deformity noted.  No foot or ankle joint effusion is noted.  Noted pain on palpation of the fourth toe on the left foot.  Mild edema noted along the fourth toe on the left foot.    Diagnostics: Radiographs were evaluated including weightbearing AP, lateral and medial oblique views of the left foot reveals a closed displaced fracture of proximal phalanx of the fourth toe.  No cortical erosions or periosteal elevation.  All joint margins appear stable.  There is no apparent tumor formation noted.  There is no evidence of foreign body.  The images were reviewed with the patient explaining the findings.      ASSESSMENT / PLAN:     " ICD-10-CM    1. Closed fracture of phalanx of left fourth toe, initial encounter  S92.502A        I have explained to Donita about the conditions.  We discussed the underlying contributing factors of the condition as well as the treatment plan and expected length of recovery.  At this time, the patient will continue jerzy taping the fourth toe to the third toe.  The patient will return in 4 weeks for reevaluation repeat x-rays.    Donita verbalized agreement with and understanding of the rational for the diagnosis and treatment plan.  All questions were answered to best of my ability and the patient's satisfaction. The patient was advised to contact the clinic with any questions that may arise after the clinic visit.      Disclaimer: This note consists of symbols derived from keyboarding, dictation and/or voice recognition software. As a result, there may be errors in the script that have gone undetected. Please consider this when interpreting information found in this chart.       CAMPOS Avelar D.P.M., F.TRINIDAD.F.A.S.

## 2022-01-27 NOTE — LETTER
"    1/27/2022         RE: Donita Lerner  [id number] [address] [facility] [address]        Dear Colleague,    Thank you for referring your patient, Donita Lerner, to the [facility] [place]. Please see a copy of my visit note below.    PATIENT HISTORY:  Donita Lerner is a 66 year old female who presents to clinic with a chief complaint of a painful left foot.  The patient relates the pain is located on the fourth toe on the left foot.  The patient relates injuring the foot on 01/20/2022 while at home. Standing up and was undressing and heel went back hitting toe.  The patient was seen by Annika Griffin with x-rays revealing fracture of phalanx of left fourth toe.  The patient was offloaded with post-op shoe.  The patient was instructed to follow up in my clinic for further evaluation and treatment options.    Pertinent medical, surgical and family history include osteoporosis    Vitals: /77   Pulse (!) 49   Ht 1.702 m (5' 7.01\")   Wt 112 kg (247 lb)   LMP 07/01/2002   BMI 38.67 kg/m    BMI= Body mass index is 38.67 kg/m .    LOWER EXTREMITY PHYSICAL EXAM    Dermatologic: Skin is intact to left lower extremities without significant lesions, rash or abrasion.        Vascular: DP & PT pulses are intact & regular on the left.   CFT and skin temperature is normal to the left lower extremities.     Neurologic: Lower extremity sensation is intact to light touch.  No evidence of weakness in the left lower extremities.        Musculoskeletal: Patient is ambulatory without assistive device or brace.  No gross ankle deformity noted.  No foot or ankle joint effusion is noted.  Noted pain on palpation of the fourth toe on the left foot.  Mild edema noted along the fourth toe on the left foot.    Diagnostics: Radiographs were evaluated including weightbearing AP, lateral and medial oblique views of the left foot reveals a closed displaced fracture of proximal phalanx of the fourth toe.  No " cortical erosions or periosteal elevation.  All joint margins appear stable.  There is no apparent tumor formation noted.  There is no evidence of foreign body.  The images were reviewed with the patient explaining the findings.      ASSESSMENT / PLAN:     ICD-10-CM    1. Closed fracture of phalanx of left fourth toe, initial encounter  S92.502A        I have explained to Donita about the conditions.  We discussed the underlying contributing factors of the condition as well as the treatment plan and expected length of recovery.  At this time, the patient will continue jerzy taping the fourth toe to the third toe.  The patient will return in 4 weeks for reevaluation repeat x-rays.    Donita verbalized agreement with and understanding of the rational for the diagnosis and treatment plan.  All questions were answered to best of my ability and the patient's satisfaction. The patient was advised to contact the clinic with any questions that may arise after the clinic visit.      Disclaimer: This note consists of symbols derived from keyboarding, dictation and/or voice recognition software. As a result, there may be errors in the script that have gone undetected. Please consider this when interpreting information found in this chart.       VIVIANA Franklin.P.M., F.A.C.F.A.S.        Again, thank you for allowing me to participate in the care of your patient.        Sincerely,        Matthias Avelar DPM

## 2022-01-27 NOTE — PATIENT INSTRUCTIONS
Caring for your injured foot or ankle    1. 1-3 days after injury  a. Rest and immobilize the injured foot or ankle.  b. Ice injured area 20/hour during the day if possible.  c. Compression, such as an ace wrap, will help reduce swelling.  d. Elevate the injured foot/ankle as much as possible  2. 4 days to 4 weeks after injury  a. Contrast soaking  i. Get two 5-gallon buckets, one ice water the other warm water  ii. Start soaking the injured foot/ankle in the ice water for up to 5 minutes then switch to the warm water for 5 minutes  iii. Repeat this once then finish with one more soak in the ice water  b. Perform simple range of motion exercises while soaking  c. Massage the injured tissue gently with a topical muscle rub  d. Weight-bear as tolerated depending upon pain (this is not the type of pain that you should push through)  3. 4+ weeks from injury  a. Increase activity as tolerated  b. Wear supportive shoes to  offload the tension forces and prevent future tissue damage.    Please notify the office if at any point there is increased pain, swelling or redness.    TOE & METATARSAL FRACTURES  The structure of the foot is complex, consisting of bones, muscles, tendons, and other soft tissues. Of the 26 bones in the foot, 19 are toe bones (phalanges) and metatarsal bones (the long bones in the midfoot). Fractures of the toe and metatarsal bones are common and require evaluation by a specialist. A foot and ankle surgeon should be seen for proper diagnosis and treatment, even if initial treatment has been received in an emergency room.  A fracture is a break in the bone. Fractures can be divided into two categories: traumatic fractures and stress fractures.  TRAUMATIC FRACTURES (also called acute fractures) are caused by a direct blow or impact, such as seriously stubbing your toe. Traumatic fractures can be displaced or non-displaced. If the fracture is displaced, the bone is broken in such a way that it has changed  in position (dislocated).  Signs and symptoms of a traumatic fracture include:  You may hear a sound at the time of the break.    Pinpoint pain  (pain at the place of impact) at the time the fracture occurs and perhaps for a few hours later, but often the pain goes away after several hours.   Crooked or abnormal appearance of the toe.   Bruising and swelling the next day.   It is not true that  if you can walk on it, it s not broken.  Evaluation by a foot and ankle surgeon is always recommended.   STRESS FRACTURES are tiny, hairline breaks that are usually caused by repetitive stress. Stress fractures often afflict athletes who, for example, too rapidly increase their running mileage. They can also be caused by an abnormal foot structure, deformities, or osteoporosis. Improper footwear may also lead to stress fractures. Stress fractures should not be ignored. They require proper medical attention to heal correctly.  Symptoms of stress fractures include:  Pain with or after normal activity   Pain that goes away when resting and then returns when standing or during activity    Pinpoint pain  (pain at the site of the fracture) when touched   Swelling, but no bruising   IMPROPER TREATMENT  Some people say that  the doctor can t do anything for a broken bone in the foot.  This is usually not true. In fact, if a fractured toe or metatarsal bone is not treated correctly, serious complications may develop. For example:  A deformity in the bony architecture which may limit the ability to move the foot or cause difficulty in fitting shoes   Arthritis, which may be caused by a fracture in a joint (the juncture where two bones meet), or may be a result of angular deformities that develop when a displaced fracture is severe or hasn t been properly corrected   Chronic pain and deformity   Non-union, or failure to heal, can lead to subsequent surgery or chronic pain.   PROPER TREATMENT FOR TOES  Fractures of the toe bones are  almost always traumatic fractures. Treatment for traumatic fractures depends on the break itself and may include these options:  Rest. Sometimes rest is all that is needed to treat a traumatic fracture of the toe.   Splinting. The toe may be fitted with a splint to keep it in a fixed position.   Rigid or stiff-soled shoe. Wearing a stiff-soled shoe protects the toe and helps keep it properly positioned.    Patricio taping  the fractured toe to another toe is sometimes appropriate, but in other cases it may be harmful.   Surgery. If the break is badly displaced or if the joint is affected, surgery may be necessary. Surgery often involves the use of fixation devices, such as pins.   PROPER TREATMENT OF METATARSALS  Breaks in the metatarsal bones may be either stress or traumatic fractures. Certain kinds of fractures of the metatarsal bones present unique challenges.  For example, sometimes a fracture of the first metatarsal bone (behind the big toe) can lead to arthritis. Since the big toe is used so frequently and bears more weight than other toes, arthritis in that area can make it painful to walk, bend, or even stand.  Another type of break, called a Bowen fracture, occurs at the base of the fifth metatarsal bone (behind the little toe). It is often misdiagnosed as an ankle sprain, and misdiagnosis can have serious consequences since sprains and fractures require different treatments. Your foot and ankle surgeon is an expert in correctly identifying these conditions as well as other problems of the foot.  Treatment of metatarsal fractures depends on the type and extent of the fracture, and may include:  Rest. Sometimes rest is the only treatment needed to promote healing of a stress or traumatic fracture of a metatarsal bone.   Avoid the offending activity. Because stress fractures result from repetitive stress, it is important to avoid the activity that led to the fracture. Crutches or a wheelchair are sometimes  required to offload weight from the foot to give it time to heal.   Immobilization, casting, or rigid shoe. A stiff-soled shoe or other form of immobilization may be used to protect the fractured bone while it is healing.   Surgery. Some traumatic fractures of the metatarsal bones require surgery, especially if the break is badly displaced.   Follow-up care. Your foot and ankle surgeon will provide instructions for care following surgical or non-surgical treatment. Physical therapy, exercises and rehabilitation may be included in a schedule for return to normal activities.

## 2022-01-27 NOTE — NURSING NOTE
"Chief Complaint   Patient presents with     Fracture     Closed fracture of phalanx of left fourth toe       Initial /77   Pulse (!) 49   Ht 1.702 m (5' 7.01\")   Wt 112 kg (247 lb)   LMP 07/01/2002   BMI 38.67 kg/m   Estimated body mass index is 38.67 kg/m  as calculated from the following:    Height as of this encounter: 1.702 m (5' 7.01\").    Weight as of this encounter: 112 kg (247 lb).  Medications and allergies reviewed.      Maggy MICHELLE MA    "

## 2022-02-22 ENCOUNTER — MYC MEDICAL ADVICE (OUTPATIENT)
Dept: FAMILY MEDICINE | Facility: CLINIC | Age: 67
End: 2022-02-22
Payer: COMMERCIAL

## 2022-02-23 ENCOUNTER — OFFICE VISIT (OUTPATIENT)
Dept: FAMILY MEDICINE | Facility: CLINIC | Age: 67
End: 2022-02-23
Payer: COMMERCIAL

## 2022-02-23 ENCOUNTER — ANCILLARY PROCEDURE (OUTPATIENT)
Dept: GENERAL RADIOLOGY | Facility: CLINIC | Age: 67
End: 2022-02-23
Attending: NURSE PRACTITIONER
Payer: COMMERCIAL

## 2022-02-23 VITALS
TEMPERATURE: 98.3 F | OXYGEN SATURATION: 98 % | WEIGHT: 240.6 LBS | BODY MASS INDEX: 37.76 KG/M2 | SYSTOLIC BLOOD PRESSURE: 112 MMHG | HEART RATE: 54 BPM | HEIGHT: 67 IN | RESPIRATION RATE: 17 BRPM | DIASTOLIC BLOOD PRESSURE: 82 MMHG

## 2022-02-23 DIAGNOSIS — R10.84 ABDOMINAL PAIN, GENERALIZED: Primary | ICD-10-CM

## 2022-02-23 DIAGNOSIS — R10.84 ABDOMINAL PAIN, GENERALIZED: ICD-10-CM

## 2022-02-23 DIAGNOSIS — K59.00 CONSTIPATION, UNSPECIFIED CONSTIPATION TYPE: ICD-10-CM

## 2022-02-23 DIAGNOSIS — R14.0 ABDOMINAL BLOATING: ICD-10-CM

## 2022-02-23 PROCEDURE — 99213 OFFICE O/P EST LOW 20 MIN: CPT | Performed by: NURSE PRACTITIONER

## 2022-02-23 PROCEDURE — 74019 RADEX ABDOMEN 2 VIEWS: CPT | Performed by: RADIOLOGY

## 2022-02-23 NOTE — PATIENT INSTRUCTIONS
Abdominal pain, generalized  Generalized abdominal pain with bloating over the last 2 weeks, not having very good bowel movements.  Last formed bowel movement approximately 2 weeks ago.  No significant blood noted.  Denies any nausea or vomiting.  No abdominal tenderness noted on examination.  Abdominal x-ray obtained in office today, independently read by this provider, notable for a large amount of stool scattered throughout the colon.  Recommend cleanout as below.  - XR Abdomen 2 Views; Future    Abdominal bloating  Abdominal bloating accompanied by generalized abdominal discomfort as above.  Recommendations as below.  - XR Abdomen 2 Views; Future    Constipation, unspecified constipation type  For constipation, recommend cleanout with MiraLAX 238 g (8.3 oz bottle) or 255 g (8.9 oz bottle [generic]) mixed in ~1,900 mL (64 ounces) of Gatorade  until dissolved (lemon-lime is typically used; do not use red). Tonight drink 240 mL (8 oz) every 10 minutes for 4 doses until 960 mL (32 oz) is consumed, if no bowel movement by tomorrow morning drink the remainder 240 mL (8 oz) every 10 minutes for 4 doses until 960 mL (32 ounces) is consumed.  Recommend increasing fluid intake now and through the weekend.  If symptoms not improved by Monday, patient to contact provider.

## 2022-02-23 NOTE — PROGRESS NOTES
Assessment & Plan     Abdominal pain, generalized  Generalized abdominal pain with bloating over the last 2 weeks, not having very good bowel movements.  Last formed bowel movement approximately 2 weeks ago.  No significant blood noted.  Denies any nausea or vomiting.  No abdominal tenderness noted on examination.  Abdominal x-ray obtained in office today, independently read by this provider, notable for a large amount of stool scattered throughout the colon.  Recommend cleanout as below.  - XR Abdomen 2 Views; Future    Abdominal bloating  Abdominal bloating accompanied by generalized abdominal discomfort as above.  Recommendations as below.  - XR Abdomen 2 Views; Future    Constipation, unspecified constipation type  For constipation, recommend cleanout with MiraLAX 238 g (8.3 oz bottle) or 255 g (8.9 oz bottle [generic]) mixed in ~1,900 mL (64 ounces) of Gatorade  until dissolved (lemon-lime is typically used; do not use red). Tonight drink 240 mL (8 oz) every 10 minutes for 4 doses until 960 mL (32 oz) is consumed, if no bowel movement by tomorrow morning drink the remainder 240 mL (8 oz) every 10 minutes for 4 doses until 960 mL (32 ounces) is consumed.  Recommend increasing fluid intake now and through the weekend.  If symptoms not improved by Monday, patient to contact provider.  - XR Abdomen 2 Views; Future             See Patient Instructions    Return in about 1 week (around 3/2/2022), or if symptoms worsen or fail to improve.    Annika Griffin, FELIPE, APRN-CNP   [facility]    Luz Lerner is a 66 year old female who presents today for the following   health issues:    HPI  Answers for HPI/ROS submitted by the patient on 2/23/2022  How many servings of fruits and vegetables do you eat daily?: 2-3  On average, how many sweetened beverages do you drink each day (Examples: soda, juice, sweet tea, etc.  Do NOT count diet or artificially sweetened beverages)?: 0  How  "many minutes a day do you exercise enough to make your heart beat faster?: 9 or less  How many days a week do you exercise enough to make your heart beat faster?: 3 or less  How many days per week do you miss taking your medication?: 0    What is the reason for your visit today?: Problems with constipation.  What are your symptoms?: Bloating, pressure, cramping.  How would you describe these symptoms?: Moderate  Are your symptoms:: Staying the same  Have you had these symptoms before?: Yes  Have you tried or received treatment for these symptoms before?: No  Is there anything that makes you feel worse?: Eating too much food.  Is there anything that makes you feel better?: Resting    Has had celiac testing, had stomach problems in the past   Has been going on for about 2 weeks  Has tried milk of magnesia, fleet enema, suppository and ex-lax and a capful of Miralax daily for at least a week. Has diarrhea, but really no real bulk  Not drinking enough water   Has been over a couple of weeks since last formed bowel movement   Noted maybe a little bright red drop in the toilet  No rectal pain or nausea         Review of Systems    Constitutional, HEENT, cardiovascular, pulmonary, gi and gu systems are negative, except as otherwise noted.    Objective   /82   Pulse 54   Temp 98.3  F (36.8  C) (Tympanic)   Resp 17   Ht 1.702 m (5' 7\")   Wt 109.1 kg (240 lb 9.6 oz)   LMP 07/01/2002   SpO2 98%   BMI 37.68 kg/m    Body mass index is 37.68 kg/m .    Physical Exam  GENERAL APPEARANCE: healthy, alert and no distress  RESP: lungs clear to auscultation - no rales, rhonchi or wheezes  CV: regular rates and rhythm, normal S1 S2, no S3 or S4 and no murmur, click or rub  ABDOMEN: soft, nontender, without hepatosplenomegaly or masses, bowel sounds normal and obese  MS: extremities normal- no gross deformities noted  SKIN: no suspicious lesions or rashes  NEURO: Normal strength and tone, mentation intact and speech " normal  PSYCH: mentation appears normal and affect normal/bright    Diagnostic Test Results:  Xray - abdomen, independently reviewed by Annika Griffin, DNP, APRN-CNP - not noting any obstruction, moderate amount of stool noted in the colon - will await final radiologist review.       Chart documentation with Dragon Voice recognition Software. Although reviewed after completion, some words and grammatical errors may remain.

## 2022-03-29 DIAGNOSIS — I10 ESSENTIAL HYPERTENSION: ICD-10-CM

## 2022-03-30 RX ORDER — AMLODIPINE BESYLATE 5 MG/1
TABLET ORAL
Qty: 135 TABLET | Refills: 1 | Status: SHIPPED | OUTPATIENT
Start: 2022-03-30 | End: 2022-09-30

## 2022-04-22 ASSESSMENT — ENCOUNTER SYMPTOMS
ABDOMINAL PAIN: 0
PARESTHESIAS: 0
JOINT SWELLING: 0
COUGH: 0
NERVOUS/ANXIOUS: 0
PALPITATIONS: 0
SORE THROAT: 0
EYE PAIN: 0
HEADACHES: 0
HEMATOCHEZIA: 0
BREAST MASS: 0
DYSURIA: 0
DIARRHEA: 0
HEMATURIA: 0
CHILLS: 0
SHORTNESS OF BREATH: 0
HEARTBURN: 0
ARTHRALGIAS: 0
FEVER: 0
NAUSEA: 0
MYALGIAS: 0
CONSTIPATION: 0
WEAKNESS: 0
DIZZINESS: 0
FREQUENCY: 0

## 2022-04-22 ASSESSMENT — ACTIVITIES OF DAILY LIVING (ADL): CURRENT_FUNCTION: NO ASSISTANCE NEEDED

## 2022-04-29 ENCOUNTER — OFFICE VISIT (OUTPATIENT)
Dept: FAMILY MEDICINE | Facility: CLINIC | Age: 67
End: 2022-04-29
Payer: COMMERCIAL

## 2022-04-29 VITALS
TEMPERATURE: 98.1 F | OXYGEN SATURATION: 96 % | BODY MASS INDEX: 35.71 KG/M2 | SYSTOLIC BLOOD PRESSURE: 132 MMHG | HEART RATE: 51 BPM | RESPIRATION RATE: 24 BRPM | WEIGHT: 235.6 LBS | DIASTOLIC BLOOD PRESSURE: 88 MMHG | HEIGHT: 68 IN

## 2022-04-29 DIAGNOSIS — F32.0 MAJOR DEPRESSIVE DISORDER, SINGLE EPISODE, MILD (H): ICD-10-CM

## 2022-04-29 DIAGNOSIS — E66.01 MORBID OBESITY, UNSPECIFIED OBESITY TYPE (H): ICD-10-CM

## 2022-04-29 DIAGNOSIS — Z00.00 ENCOUNTER FOR MEDICARE ANNUAL WELLNESS EXAM: Primary | ICD-10-CM

## 2022-04-29 PROCEDURE — 99213 OFFICE O/P EST LOW 20 MIN: CPT | Mod: 25 | Performed by: NURSE PRACTITIONER

## 2022-04-29 PROCEDURE — G0438 PPPS, INITIAL VISIT: HCPCS | Performed by: NURSE PRACTITIONER

## 2022-04-29 RX ORDER — NYSTATIN 100000 U/G
OINTMENT TOPICAL
COMMUNITY
Start: 2022-02-23 | End: 2023-04-19

## 2022-04-29 ASSESSMENT — ENCOUNTER SYMPTOMS
MUSCULOSKELETAL NEGATIVE: 1
NEUROLOGICAL NEGATIVE: 1
CARDIOVASCULAR NEGATIVE: 1
RESPIRATORY NEGATIVE: 1
EYES NEGATIVE: 1
ALLERGIC/IMMUNOLOGIC NEGATIVE: 1
PSYCHIATRIC NEGATIVE: 1
CONSTITUTIONAL NEGATIVE: 1
ENDOCRINE NEGATIVE: 1
GASTROINTESTINAL NEGATIVE: 1
HEMATOLOGIC/LYMPHATIC NEGATIVE: 1

## 2022-04-29 ASSESSMENT — PATIENT HEALTH QUESTIONNAIRE - PHQ9: SUM OF ALL RESPONSES TO PHQ QUESTIONS 1-9: 0

## 2022-04-29 NOTE — PATIENT INSTRUCTIONS
(Z00.00) Encounter for Medicare annual wellness exam  (primary encounter diagnosis)  Comment: Relatively healthy 66-year-old female here today for routine annual wellness.    (F32.0) Major depressive disorder, single episode, mild (H)  Comment: Chronic, stable.  Doing well on Wellbutrin and Lexapro.  No changes.  Encouraged to continue to stay as active as able and follow a healthy, well-balanced diet.    (E66.01) Morbid obesity, unspecified obesity type (H)  Comment: Chronic, discussed increasing daily physical activity as well as a healthy, well-balanced diet.      Patient Education   Personalized Prevention Plan  You are due for the preventive services outlined below.  Your care team is available to assist you in scheduling these services.  If you have already completed any of these items, please share that information with your care team to update in your medical record.  Health Maintenance Due   Topic Date Due    Pneumococcal Vaccine (1 - PCV) Never done    Flu Vaccine (1) 09/01/2021    Diptheria Tetanus Pertussis (DTAP/TDAP/TD) Vaccine (3 - Td or Tdap) 10/06/2021    Depression Assessment  05/05/2022

## 2022-04-29 NOTE — PROGRESS NOTES
"SUBJECTIVE:   Donita Lerner is a 66 year old female who presents for Preventive Visit.      Patient has been advised of split billing requirements and indicates understanding: Yes  Are you in the first 12 months of your Medicare coverage?  No    HPI     Answers for HPI/ROS submitted by the patient on 4/22/2022  In general, how would you rate your overall physical health?: good  Frequency of exercise:: 1 day/week  Do you usually eat at least 4 servings of fruit and vegetables a day, include whole grains & fiber, and avoid regularly eating high fat or \"junk\" foods? : No  Taking medications regularly:: Yes  Medication side effects:: None  Activities of Daily Living: no assistance needed  Home safety: no safety concerns identified  Hearing Impairment:: difficulty following a conversation in a noisy restaurant or crowded room  In the past 6 months, have you been bothered by leaking of urine?: No  In general, how would you rate your overall mental or emotional health?: good  Additional concerns today:: Yes  Do you feel safe in your environment? Yes    Have you ever done Advance Care Planning? (For example, a Health Directive, POLST, or a discussion with a medical provider or your loved ones about your wishes): No, advance care planning information given to patient to review.  Patient plans to discuss their wishes with loved ones or provider.         Fall risk  Fallen 2 or more times in the past year?: No  Any fall with injury in the past year?: No    Cognitive Screening   1) Repeat 3 items (Leader, Season, Table)    2) Clock draw: NORMAL  3) 3 item recall: Recalls 2 objects   Results: NORMAL clock, 1-2 items recalled: COGNITIVE IMPAIRMENT LESS LIKELY    Mini-CogTM Copyright GAEL Albarado. Licensed by the author for use in [facility]; reprinted with permission ([email or web address]). All rights reserved.      Do you have sleep apnea, excessive snoring or daytime drowsiness?: no    Reviewed and updated as needed this visit " by clinical staff   Tobacco  Allergies  Meds  Problems  Med Hx  Surg Hx  Fam Hx  Soc   Hx          Reviewed and updated as needed this visit by Provider     Meds  Problems              Social History     Tobacco Use     Smoking status: Never Smoker     Smokeless tobacco: Never Used   Substance Use Topics     Alcohol use: No     If you drink alcohol do you typically have >3 drinks per day or >7 drinks per week? No    No flowsheet data found.          Current providers sharing in care for this patient include:   Patient Care Team:  Annika Griffin APRN CNP as PCP - General (Nurse Practitioner - Family)  Annika Griffin APRN CNP as Assigned PCP  Radha Flores MD as Assigned Surgical Provider  Matthias Avelar DPM as Assigned Musculoskeletal Provider    The following health maintenance items are reviewed in Epic and correct as of today:  Health Maintenance Due   Topic Date Due     DTAP/TDAP/TD IMMUNIZATION (1 - Tdap) 10/06/2011     Pneumococcal Vaccine: 65+ Years (1 - PCV) Never done     Labs reviewed in EPIC  BP Readings from Last 3 Encounters:   04/29/22 132/88   02/23/22 112/82   01/27/22 130/77    Wt Readings from Last 3 Encounters:   04/29/22 106.9 kg (235 lb 9.6 oz)   02/23/22 109.1 kg (240 lb 9.6 oz)   01/27/22 112 kg (247 lb)                  Patient Active Problem List   Diagnosis     Generalized anxiety disorder     Symptomatic menopausal or female climacteric states     Sensorineural hearing loss     CNS DISORDER -gliosis on mri     S/P total knee arthroplasty     Advanced care planning/counseling discussion     Abnormal MRI of head     Essential hypertension      obesity, unspecified obesity type (HCC)     Major depressive disorder, single episode, mild (H)     S/P left unicompartmental knee replacement     Osteoporosis     Seasonal affective disorder (H)     Calculus of gallbladder without cholecystitis     S/P laparoscopic cholecystectomy     Neck abscess     Past Surgical  History:   Procedure Laterality Date     ADENOIDECTOMY       ARTHROPLASTY KNEE  2013    Procedure: ARTHROPLASTY KNEE;  Right Total Knee Arthroplasty  ;  Surgeon: Ganesh Sheikh MD;  Location: [facility] OR     ARTHROPLASTY KNEE UNICOMPARTMENT Left 2016    Procedure: ARTHROPLASTY KNEE UNICOMPARTMENT;  Surgeon: Ganesh Sheikh MD;  Location: [facility] OR     C ANESTH, SECTION      x3     IR LYMPH NODE BIOPSY  10/14/2021     LAPAROSCOPIC CHOLECYSTECTOMY WITH CHOLANGIOGRAMS N/A 2018    Procedure: LAPAROSCOPIC CHOLECYSTECTOMY WITH CHOLANGIOGRAMS;  Laparoscopic Cholecystectomy with Intraoperative Cholangiogram;  Surgeon: Erick Rivera MD;  Location: [facility] OR     TONSILLECTOMY  1967       Social History     Tobacco Use     Smoking status: Never Smoker     Smokeless tobacco: Never Used   Substance Use Topics     Alcohol use: No     Family History   Problem Relation Age of Onset     Cancer Mother         Lymphoma     Depression Mother      Cancer Father         Carcinoma Mesothelioma ([place])     Psychotic Disorder Daughter         borderline personality disorder     Depression Brother          Current Outpatient Medications   Medication Sig Dispense Refill     amLODIPine (NORVASC) 5 MG tablet TAKE 1 AND 1/2 TABLETS (7.5MG) DAILY. 135 tablet 1     buPROPion (WELLBUTRIN XL) 300 MG 24 hr tablet Take 1 tablet (300 mg) by mouth every morning DO NOT CRUSH. 90 tablet 3     Cholecalciferol (VITAMIN D) 2000 UNITS tablet Take 2,000 Units by mouth daily 100 tablet 3     escitalopram (LEXAPRO) 20 MG tablet Take 1 tablet (20 mg) by mouth daily 90 tablet 3     ibuprofen (ADVIL/MOTRIN) 200 MG tablet Take 600 mg by mouth every 4 hours as needed for mild pain        metoprolol succinate ER (TOPROL-XL) 25 MG 24 hr tablet TAKE 1 TABLET DAILY 90 tablet 3     nystatin (MYCOSTATIN) 655728 UNIT/GM external ointment APPLY TOPICALLY TO AFFECTED AREA THREE TIMES DAILY       Allergies   Allergen Reactions      "Penicillins Anaphylaxis and Swelling     Lisinopril Cough     Sulfa Drugs Rash       Breast CA Risk Assessment (FHS-7) 3/15/2021   Do you have a family history of breast, colon, or ovarian cancer? No / Unknown          Mammogram Screening: Recommended mammography every 1-2 years with patient discussion and risk factor consideration  Pertinent mammograms are reviewed under the imaging tab.    Review of Systems   Constitutional: Negative.    HENT: Negative.    Eyes: Negative.    Respiratory: Negative.    Cardiovascular: Negative.    Gastrointestinal: Negative.    Endocrine: Negative.    Breasts:  negative.    Genitourinary: Negative.    Musculoskeletal: Negative.    Skin: Negative.    Allergic/Immunologic: Negative.    Neurological: Negative.    Hematological: Negative.    Psychiatric/Behavioral: Negative.    All other systems reviewed and are negative.      OBJECTIVE:   /88   Pulse 51   Temp 98.1  F (36.7  C)   Resp 24   Ht 1.715 m (5' 7.5\")   Wt 106.9 kg (235 lb 9.6 oz)   LMP 07/01/2002   SpO2 96%   Breastfeeding No   BMI 36.36 kg/m   Estimated body mass index is 36.36 kg/m  as calculated from the following:    Height as of this encounter: 1.715 m (5' 7.5\").    Weight as of this encounter: 106.9 kg (235 lb 9.6 oz).  Physical Exam  GENERAL APPEARANCE: healthy, alert and no distress  EYES: Eyes grossly normal to inspection, PERRL and conjunctivae and sclerae normal  HENT: ear canals and TM's normal, nose and mouth without ulcers or lesions, oropharynx clear and oral mucous membranes moist  NECK: no adenopathy, no asymmetry, masses, or scars and thyroid normal to palpation  RESP: lungs clear to auscultation - no rales, rhonchi or wheezes  BREAST: normal without masses, tenderness or nipple discharge and no palpable axillary masses or adenopathy  CV: regular rate and rhythm, normal S1 S2, no S3 or S4, no murmur, click or rub, no peripheral edema and peripheral pulses strong  ABDOMEN: soft, nontender, no " "hepatosplenomegaly, no masses and bowel sounds normal   (female): normal female external genitalia, normal urethral meatus, vaginal mucosal atrophy noted, normal cervix, adnexae, and uterus without masses or abnormal discharge  MS: no musculoskeletal defects are noted and gait is age appropriate without ataxia  SKIN: no suspicious lesions or rashes  NEURO: Normal strength and tone, sensory exam grossly normal, mentation intact and speech normal  PSYCH: mentation appears normal and affect normal/bright    Diagnostic Test Results:  Labs reviewed in Epic  none     ASSESSMENT / PLAN:   (Z00.00) Encounter for Medicare annual wellness exam  (primary encounter diagnosis)  Comment: Relatively healthy 66-year-old female here today for routine annual wellness.    (F32.0) Major depressive disorder, single episode, mild (H)  Comment: Chronic, stable.  Doing well on Wellbutrin and Lexapro.  No changes.  Encouraged to continue to stay as active as able and follow a healthy, well-balanced diet.    (E66.01) Morbid obesity, unspecified obesity type (H)  Comment: Chronic, discussed increasing daily physical activity as well as a healthy, well-balanced diet.    Patient has been advised of split billing requirements and indicates understanding: Yes    COUNSELING:  Reviewed preventive health counseling, as reflected in patient instructions . After discussion with patient, patient verbalizes and agreeable to receive AVS instructions via My Chart, not printed today     Estimated body mass index is 36.36 kg/m  as calculated from the following:    Height as of this encounter: 1.715 m (5' 7.5\").    Weight as of this encounter: 106.9 kg (235 lb 9.6 oz).    Weight management plan: Discussed healthy diet and exercise guidelines    She reports that she has never smoked. She has never used smokeless tobacco.      Appropriate preventive services were discussed with this patient, including applicable screening as appropriate for cardiovascular " disease, diabetes, osteopenia/osteoporosis, and glaucoma.  As appropriate for age/gender, discussed screening for colorectal cancer, prostate cancer, breast cancer, and cervical cancer. Checklist reviewing preventive services available has been given to the patient.    Reviewed patients plan of care and provided an AVS. The Basic Care Plan (routine screening as documented in Health Maintenance) for Donita meets the Care Plan requirement. This Care Plan has been established and reviewed with the Patient.    Counseling Resources:  ATP IV Guidelines  Pooled Cohorts Equation Calculator  Breast Cancer Risk Calculator  Breast Cancer: Medication to Reduce Risk  FRAX Risk Assessment  ICSI Preventive Guidelines  Dietary Guidelines for Americans, 2010  USDA's MyPlate  ASA Prophylaxis  Lung CA Screening    Annika Griffin, FELIPE, APRN-CNP   [facility]    Identified Health Risks:    Chart documentation with Dragon Voice recognition Software. Although reviewed after completion, some words and grammatical errors may remain.

## 2022-08-01 ENCOUNTER — MYC MEDICAL ADVICE (OUTPATIENT)
Dept: FAMILY MEDICINE | Facility: CLINIC | Age: 67
End: 2022-08-01

## 2022-09-29 DIAGNOSIS — I10 ESSENTIAL HYPERTENSION, BENIGN: ICD-10-CM

## 2022-09-29 DIAGNOSIS — I10 ESSENTIAL HYPERTENSION: ICD-10-CM

## 2022-09-30 RX ORDER — METOPROLOL SUCCINATE 25 MG/1
TABLET, EXTENDED RELEASE ORAL
Qty: 90 TABLET | Refills: 1 | Status: SHIPPED | OUTPATIENT
Start: 2022-09-30 | End: 2023-02-22

## 2022-09-30 RX ORDER — AMLODIPINE BESYLATE 5 MG/1
TABLET ORAL
Qty: 135 TABLET | Refills: 1 | Status: SHIPPED | OUTPATIENT
Start: 2022-09-30 | End: 2023-04-19

## 2022-10-18 ENCOUNTER — MYC MEDICAL ADVICE (OUTPATIENT)
Dept: FAMILY MEDICINE | Facility: CLINIC | Age: 67
End: 2022-10-18

## 2022-10-18 DIAGNOSIS — L71.0 PERIORAL DERMATITIS: Primary | ICD-10-CM

## 2022-10-19 RX ORDER — MUPIROCIN 20 MG/G
OINTMENT TOPICAL 3 TIMES DAILY
Qty: 30 G | Refills: 0 | Status: SHIPPED | OUTPATIENT
Start: 2022-10-19 | End: 2022-11-17

## 2022-11-17 ENCOUNTER — MYC REFILL (OUTPATIENT)
Dept: FAMILY MEDICINE | Facility: CLINIC | Age: 67
End: 2022-11-17

## 2022-11-17 DIAGNOSIS — L71.0 PERIORAL DERMATITIS: ICD-10-CM

## 2022-11-17 NOTE — TELEPHONE ENCOUNTER
Routing to ordering provider for consideration, not on refill protocol.           Stacia Butler     RN MSN

## 2022-11-18 RX ORDER — MUPIROCIN 20 MG/G
OINTMENT TOPICAL 3 TIMES DAILY
Qty: 30 G | Refills: 0 | Status: SHIPPED | OUTPATIENT
Start: 2022-11-18 | End: 2023-04-19

## 2022-11-20 ENCOUNTER — HEALTH MAINTENANCE LETTER (OUTPATIENT)
Age: 67
End: 2022-11-20

## 2022-12-07 DIAGNOSIS — F41.1 GENERALIZED ANXIETY DISORDER: ICD-10-CM

## 2022-12-07 DIAGNOSIS — F32.0 MAJOR DEPRESSIVE DISORDER, SINGLE EPISODE, MILD (H): ICD-10-CM

## 2022-12-09 RX ORDER — ESCITALOPRAM OXALATE 20 MG/1
TABLET ORAL
Qty: 90 TABLET | Refills: 1 | Status: SHIPPED | OUTPATIENT
Start: 2022-12-09 | End: 2023-04-19

## 2022-12-09 RX ORDER — BUPROPION HYDROCHLORIDE 300 MG/1
TABLET ORAL
Qty: 90 TABLET | Refills: 1 | Status: SHIPPED | OUTPATIENT
Start: 2022-12-09 | End: 2023-04-19

## 2022-12-09 NOTE — TELEPHONE ENCOUNTER
"Requested Prescriptions   Pending Prescriptions Disp Refills     escitalopram (LEXAPRO) 20 MG tablet [Pharmacy Med Name: ESCITALOPRAM TAB 20MG] 90 tablet 3     Sig: TAKE 1 TABLET DAILY       SSRIs Protocol Failed - 12/7/2022  4:38 PM        Failed - PHQ-9 score less than 5 in past 6 months     Please review last PHQ-9 score.           Failed - Recent (6 mo) or future (30 days) visit within the authorizing provider's specialty     Patient had office visit in the last 6 months or has a visit in the next 30 days with authorizing provider or within the authorizing provider's specialty.  See \"Patient Info\" tab in inbasket, or \"Choose Columns\" in Meds & Orders section of the refill encounter.            Passed - Medication is Bupropion     If the medication is Bupropion (Wellbutrin), and the patient is taking for smoking cessation; OK to refill.          Passed - Medication is active on med list        Passed - Patient is age 18 or older        Passed - No active pregnancy on record        Passed - No positive pregnancy test in last 12 months           buPROPion (WELLBUTRIN XL) 300 MG 24 hr tablet [Pharmacy Med Name: BUPROP 24 XL TAB 300MG] 90 tablet 3     Sig: TAKE 1 TABLET EVERY MORNING(DO NOT CRUSH)       SSRIs Protocol Failed - 12/7/2022  4:38 PM        Failed - PHQ-9 score less than 5 in past 6 months     Please review last PHQ-9 score.           Failed - Recent (6 mo) or future (30 days) visit within the authorizing provider's specialty     Patient had office visit in the last 6 months or has a visit in the next 30 days with authorizing provider or within the authorizing provider's specialty.  See \"Patient Info\" tab in inbasket, or \"Choose Columns\" in Meds & Orders section of the refill encounter.            Passed - Medication is Bupropion     If the medication is Bupropion (Wellbutrin), and the patient is taking for smoking cessation; OK to refill.          Passed - Medication is active on med list        Passed - " Patient is age 18 or older        Passed - No active pregnancy on record        Passed - No positive pregnancy test in last 12 months

## 2022-12-28 ENCOUNTER — OFFICE VISIT (OUTPATIENT)
Dept: URGENT CARE | Facility: URGENT CARE | Age: 67
End: 2022-12-28
Payer: COMMERCIAL

## 2022-12-28 VITALS
BODY MASS INDEX: 38.42 KG/M2 | SYSTOLIC BLOOD PRESSURE: 138 MMHG | WEIGHT: 249 LBS | TEMPERATURE: 98.4 F | DIASTOLIC BLOOD PRESSURE: 97 MMHG | OXYGEN SATURATION: 97 % | HEART RATE: 72 BPM

## 2022-12-28 DIAGNOSIS — K59.00 CONSTIPATION, UNSPECIFIED CONSTIPATION TYPE: ICD-10-CM

## 2022-12-28 DIAGNOSIS — R19.5 DARK STOOLS: Primary | ICD-10-CM

## 2022-12-28 DIAGNOSIS — K21.00 GASTROESOPHAGEAL REFLUX DISEASE WITH ESOPHAGITIS, UNSPECIFIED WHETHER HEMORRHAGE: ICD-10-CM

## 2022-12-28 LAB
BASOPHILS # BLD AUTO: 0 10E3/UL (ref 0–0.2)
BASOPHILS NFR BLD AUTO: 0 %
EOSINOPHIL # BLD AUTO: 0.2 10E3/UL (ref 0–0.7)
EOSINOPHIL NFR BLD AUTO: 2 %
ERYTHROCYTE [DISTWIDTH] IN BLOOD BY AUTOMATED COUNT: 13.3 % (ref 10–15)
HCT VFR BLD AUTO: 44.4 % (ref 35–47)
HGB BLD-MCNC: 14.6 G/DL (ref 11.7–15.7)
IMM GRANULOCYTES # BLD: 0.2 10E3/UL
IMM GRANULOCYTES NFR BLD: 2 %
LYMPHOCYTES # BLD AUTO: 1.8 10E3/UL (ref 0.8–5.3)
LYMPHOCYTES NFR BLD AUTO: 18 %
MCH RBC QN AUTO: 29.1 PG (ref 26.5–33)
MCHC RBC AUTO-ENTMCNC: 32.9 G/DL (ref 31.5–36.5)
MCV RBC AUTO: 88 FL (ref 78–100)
MONOCYTES # BLD AUTO: 0.9 10E3/UL (ref 0–1.3)
MONOCYTES NFR BLD AUTO: 9 %
NEUTROPHILS # BLD AUTO: 6.8 10E3/UL (ref 1.6–8.3)
NEUTROPHILS NFR BLD AUTO: 69 %
PLATELET # BLD AUTO: 238 10E3/UL (ref 150–450)
RBC # BLD AUTO: 5.02 10E6/UL (ref 3.8–5.2)
WBC # BLD AUTO: 9.9 10E3/UL (ref 4–11)

## 2022-12-28 PROCEDURE — 99214 OFFICE O/P EST MOD 30 MIN: CPT | Performed by: NURSE PRACTITIONER

## 2022-12-28 PROCEDURE — 36415 COLL VENOUS BLD VENIPUNCTURE: CPT | Performed by: NURSE PRACTITIONER

## 2022-12-28 PROCEDURE — 85025 COMPLETE CBC W/AUTO DIFF WBC: CPT | Performed by: NURSE PRACTITIONER

## 2022-12-28 RX ORDER — POLYETHYLENE GLYCOL 3350 17 G/17G
1 POWDER, FOR SOLUTION ORAL DAILY
COMMUNITY
End: 2023-04-19

## 2022-12-28 NOTE — PATIENT INSTRUCTIONS
Recommend clear liquid diet until symptoms improve 2-3 days then incorporate soft bland diet. If symptoms of dark stools, abdominal pain, fever, back pain occur please present to emergency as this may indicate gastric bleeding.     Please follow up with your primary care you will likely need to be seen by gastroenterology may need scope of upper and lower GI tract as discussed.     Stop taking (Nsaids) ibuprofen, aleve, motrin, advil. Tylenol is ok

## 2022-12-28 NOTE — PROGRESS NOTES
Assessment & Plan      Diagnosis Comments   1. Dark stools  CBC with platelets and differential, Occult blood stool POCT, Enter/Edit Result, Occult blood stool       2. Constipation, unspecified constipation type  CBC with platelets and differential, Occult blood stool POCT, Enter/Edit Result, Occult blood stool       3. Gastroesophageal reflux disease with esophagitis, unspecified whether hemorrhage  CBC with platelets and differential, Occult blood stool POCT, Enter/Edit Result, Occult blood stool         Discussed with patient home care which would include to discontinue use of NSAIDs.  She does not use alcohol.  Concern for ulcer or upper GI bleeding secondary to noted darker stools.  Benign assessment today with mild tenderness in the epigastric and left side of her abdomen abdomen was soft.  CBC was normal.  Would recommend patient follow-up with her primary care provider she will likely need to see GI specialty for colonoscopy and upper endoscopies.  Discussed clear liquid diet for now until symptoms improve likely 2 to 3 days then would be able to add in soft bland food.  She does have a follow-up appointment already scheduled with her primary care provider she is going to see if she can changes to an earlier date this is in about 1 week's time.  DDX: Ulcer, GERD, diverticulitis, IBS, viral gastroenteritis,  Discussed red flags that would warrant emergent evaluation patient verbalized understanding.    TERRA Vance [facility]    Subjective     Danika is a 67 year old female who presents to clinic today for the following health issues:  Chief Complaint   Patient presents with     Abdominal Pain     Lower abdominal pain for a couple weeks, was constipated took dulcolax, then Miralax, yesterday had dark stool and pain that wrapped around to her back, gone today. Did not take Miralax today.       HPI    Patient presents to clinic with a 2 week history of constipation  she has been taking Miralax for constipation for some time now, she also took dulcolax recently. She had dark stools soft/diarrhea last two nights. Also endorses nausea once this week no vomiting.  States food does not make symptoms worse.   She notes that she takes 3 ibuprofen every morning has been doing this for over a year.  She does have epigastric discomfort she states she has a history of acid reflux.    She denies having history of colonoscopy or upper endoscopy.  Denies family history of colon cancer.      Review of Systems  Constitutional, HEENT, cardiovascular, pulmonary, gi and gu systems are negative, except as otherwise noted.      Objective    BP (!) 138/97   Pulse 72   Temp 98.4  F (36.9  C) (Tympanic)   Wt 112.9 kg (249 lb)   LMP 07/01/2002   SpO2 97%   BMI 38.42 kg/m    Physical Exam   GENERAL: alert, no distress and over weight  EYES: Eyes grossly normal to inspection, PERRL and conjunctivae and sclerae normal  HENT: ear canals and TM's normal, nose and mouth without ulcers or lesions  NECK: no adenopathy, no asymmetry, masses, or scars and thyroid normal to palpation  RESP: lungs clear to auscultation - no rales, rhonchi or wheezes  CV: regular rate and rhythm, normal S1 S2, no S3 or S4, no murmur, click or rub, no peripheral edema and peripheral pulses strong  ABDOMEN: mild tenderness epigastric and left side to lower quadrant with palpation, no organomegaly or masses, liver span normal to percussion, bowel sounds normal, no palpable or pulsatile masses, no bruits heard and no palpable renal abnormalities   MS: no gross musculoskeletal defects noted, no edema  SKIN: no suspicious lesions or rashes  BACK: no CVA tenderness, no paralumbar tenderness  LYMPH: no cervical, supraclavicular, axillary, or inguinal adenopathy    Results for orders placed or performed in visit on 12/28/22   CBC with platelets and differential     Status: None   Result Value Ref Range    WBC Count 9.9 4.0 - 11.0  10e3/uL    RBC Count 5.02 3.80 - 5.20 10e6/uL    Hemoglobin 14.6 11.7 - 15.7 g/dL    Hematocrit 44.4 35.0 - 47.0 %    MCV 88 78 - 100 fL    MCH 29.1 26.5 - 33.0 pg    MCHC 32.9 31.5 - 36.5 g/dL    RDW 13.3 10.0 - 15.0 %    Platelet Count 238 150 - 450 10e3/uL    % Neutrophils 69 %    % Lymphocytes 18 %    % Monocytes 9 %    % Eosinophils 2 %    % Basophils 0 %    % Immature Granulocytes 2 %    Absolute Neutrophils 6.8 1.6 - 8.3 10e3/uL    Absolute Lymphocytes 1.8 0.8 - 5.3 10e3/uL    Absolute Monocytes 0.9 0.0 - 1.3 10e3/uL    Absolute Eosinophils 0.2 0.0 - 0.7 10e3/uL    Absolute Basophils 0.0 0.0 - 0.2 10e3/uL    Absolute Immature Granulocytes 0.2 <=0.4 10e3/uL   CBC with platelets and differential     Status: None    Narrative    The following orders were created for panel order CBC with platelets and differential.  Procedure                               Abnormality         Status                     ---------                               -----------         ------                     CBC with platelets and d...[[id number]]                      Final result                 Please view results for these tests on the individual orders.

## 2023-01-04 ENCOUNTER — TELEPHONE (OUTPATIENT)
Dept: SURGERY | Facility: CLINIC | Age: 68
End: 2023-01-04

## 2023-01-04 NOTE — TELEPHONE ENCOUNTER
Screening Questions  BLUE  KIND OF PREP RED  LOCATION [review exclusion criteria] GREEN  SEDATION TYPE        Y Are you active on mychart?       Annika Griffin Ordering/Referring Provider?        [facility] What type of coverage do you have?      N Have you had a positive covid test in the last 14 days?     38.99 1. BMI  [BMI 40+ - review exclusion criteria]    Y  2. Are you able to give consent for your medical care? [IF NO,RN REVIEW]        N  3. Are you taking any prescription pain medications on a routine schedule?      N  3a. EXTENDED PREP What kind of prescription?     N 4. Do you have any chemical dependencies such as alcohol, street drugs, or methadone?    N 5. Do you have any history of post-traumatic stress syndrome, severe anxiety or history of psychosis?      **If yes 3- 5 , please schedule with MAC sedation.**          IF YES TO ANY 6 - 10 - HOSPITAL SETTING ONLY.     N 6.   Do you need assistance transferring?     N 7.   Have you had a heart or lung transplant?    N 8.   Are you currently on dialysis?   N 9.   Do you use daily home oxygen?   N 10. Do you take nitroglycerin?   10a. N If yes, how often?     11. [FEMALES]  N Are you currently pregnant?    11a. N If yes, how many weeks? [ Greater than 12 weeks, OR NEEDED]    N 12. Do you have Pulmonary Hypertension? *NEED PAC APPT AT [facility]*     N 13. [review exclusion criteria]  Do you have any implantable devices in your body (pacemaker, defib, LVAD)?    N 14. In the past 6 months, have you had any heart related issues including cardiomyopathy or heart attack?     14a. N If yes, did it require cardiac stenting if so when?     N 15. Have you had a stroke or Transient ischemic attack (TIA - aka  mini stroke ) within 6 months?      N 16. Do you have mod to severe Obstructive Sleep Apnea?  [Hospital only]    N 17. Do you have SEVERE AND UNCONTROLLED asthma? *NEED PAC APPT AT [facility]*     N 18. Are you currently taking any blood thinners?     18a. If  "yes, inform patient to \"follow up w/ ordering provider for bridging instructions.\"    N 19. Do you take the medication Phentermine?    19a. If yes, \"Hold for 7 days before procedure.  Please consult your prescribing provider if you have questions about holding this medication.\"     N  20. Do you have chronic kidney disease?      N  21. Do you have a diagnosis of diabetes?     Y  22. On a regular basis do you go 3-5 days between bowel movements?     See below 23. Preferred LOCAL Pharmacy for Pre Prescription    [ LIST ONLY ONE PHARMACY]          [id number] - [place], [place] - [address]        - CLOSING REMINDERS -    Informed patient they will need an adult    Cannot take any type of public or medical transportation alone    Conscious Sedation- Needs  for 6 hours after the procedure       MAC/General-Needs  for 24 hours after procedure    Pre-Procedure Covid test to be completed [[facility] PCR Testing Required]    Confirmed Nurse will call to complete assessment       - SCHEDULING DETAILS -  N Hospital Setting Required? If yes, what is the exclusion?: SILVANO Vides  Surgeon    2-22-23  Date of Procedure  Lower Endoscopy [Colonoscopy]  Type of Procedure Scheduled  [facility] EXTENDED - If you answer yes to questions #1, #3, #22 (De Navneeten and CF pts)Which Colonoscopy Prep was Sent?     GEN Sedation Type     N PAC / Pre-op Required                 "

## 2023-01-13 ENCOUNTER — ANCILLARY PROCEDURE (OUTPATIENT)
Dept: GENERAL RADIOLOGY | Facility: CLINIC | Age: 68
End: 2023-01-13
Attending: NURSE PRACTITIONER
Payer: COMMERCIAL

## 2023-01-13 ENCOUNTER — OFFICE VISIT (OUTPATIENT)
Dept: FAMILY MEDICINE | Facility: CLINIC | Age: 68
End: 2023-01-13
Payer: COMMERCIAL

## 2023-01-13 VITALS
HEART RATE: 85 BPM | BODY MASS INDEX: 37 KG/M2 | TEMPERATURE: 97.5 F | SYSTOLIC BLOOD PRESSURE: 120 MMHG | WEIGHT: 239.8 LBS | DIASTOLIC BLOOD PRESSURE: 78 MMHG | OXYGEN SATURATION: 97 % | RESPIRATION RATE: 24 BRPM

## 2023-01-13 DIAGNOSIS — K59.00 CONSTIPATION, UNSPECIFIED CONSTIPATION TYPE: Primary | ICD-10-CM

## 2023-01-13 DIAGNOSIS — R14.0 BLOATING: ICD-10-CM

## 2023-01-13 DIAGNOSIS — K59.00 CONSTIPATION, UNSPECIFIED CONSTIPATION TYPE: ICD-10-CM

## 2023-01-13 DIAGNOSIS — R10.32 LLQ ABDOMINAL PAIN: ICD-10-CM

## 2023-01-13 PROCEDURE — 99213 OFFICE O/P EST LOW 20 MIN: CPT | Performed by: NURSE PRACTITIONER

## 2023-01-13 PROCEDURE — 74019 RADEX ABDOMEN 2 VIEWS: CPT | Mod: TC | Performed by: RADIOLOGY

## 2023-01-13 ASSESSMENT — ENCOUNTER SYMPTOMS: ABDOMINAL PAIN: 1

## 2023-01-13 ASSESSMENT — PATIENT HEALTH QUESTIONNAIRE - PHQ9
SUM OF ALL RESPONSES TO PHQ QUESTIONS 1-9: 11
SUM OF ALL RESPONSES TO PHQ QUESTIONS 1-9: 11
10. IF YOU CHECKED OFF ANY PROBLEMS, HOW DIFFICULT HAVE THESE PROBLEMS MADE IT FOR YOU TO DO YOUR WORK, TAKE CARE OF THINGS AT HOME, OR GET ALONG WITH OTHER PEOPLE: SOMEWHAT DIFFICULT

## 2023-01-13 ASSESSMENT — PAIN SCALES - GENERAL: PAINLEVEL: MILD PAIN (2)

## 2023-01-13 NOTE — PROGRESS NOTES
"  Assessment & Plan     Constipation, unspecified constipation type  Ongoing, intermittent chronic constipation. More bloating. No abdominal tenderness on exam today. Abdominal x-ray obtained in office today, already read by radiologist indicating no obstruction and moderate amount of stool in colon.  Has had cleanout done in the past, has colonoscopy scheduled at the end of February for further evaluation.  Patient currently following the FODMAP diet which I would encourage her to continue to follow.  Recommend cleanout today and increase fluid intake.  Cleanout instructions as below.  If symptoms not improved after cleanout, patient to notify provider.    Start a clear liquid diet this evening.  A clear liquid diet consists of soda, juices without pulp, broth, Jell-O, Popsicles, Italian ice, hard candies.  Pretty much anything you can see through!!  (NO dairy products or solid food.)    In the evening start with a 238 g (8.3 oz bottle) or 255 g (8.9 oz bottle [generic]) mixed in ~1,900 mL (64 ounces) of Gatorade  until dissolved (lemon-lime is typically used; do not use red)   Drink 240 mL (8 oz) every 10 minutes for 4 doses until 960 mL (32 oz) is consumed.    The following morning IF NO bowel movement  drink the remainder 240 mL (8 oz) every 10 minutes for 4 doses until 960 mL (32 ounces) is consumed.   - XR Abdomen 2 Views; Future    LLQ abdominal pain  Intermittent left lower quadrant abdominal pain accompanied by nausea intermittently.  Denies any fever.  No tenderness on exam today.  Abdominal x-ray as above.  Would recommend following through with colonoscopy at the end of next month and following recommendations as above.  - XR Abdomen 2 Views; Future    Bloating  Bloating accompanied by symptoms as above.               BMI:   Estimated body mass index is 37 kg/m  as calculated from the following:    Height as of 4/29/22: 1.715 m (5' 7.5\").    Weight as of this encounter: 108.8 kg (239 lb 12.8 oz). "   Weight management plan: Discussed healthy diet and exercise guidelines    See Patient Instructions    Return in about 1 week (around 2023), or if symptoms worsen or fail to improve.    Annika Griffin, FELIPE, APRN-CNP   [facility]    Luz Garnica is a 67 year old, presenting for the following health issues:  Abdominal Pain (IBS)      Abdominal Pain     History of Present Illness       Reason for visit:  IBS Symptoms    She eats 2-3 servings of fruits and vegetables daily.She consumes 0 sweetened beverage(s) daily.She exercises with enough effort to increase her heart rate 9 or less minutes per day.  She exercises with enough effort to increase her heart rate 3 or less days per week.   She is taking medications regularly.    Today's PHQ-9         PHQ-9 Total Score: 11    PHQ-9 Q9 Thoughts of better off dead/self-harm past 2 weeks :   Not at all    How difficult have these problems made it for you to do your work, take care of things at home, or get along with other people: Somewhat difficult   possible constipation feels like blocked  Constipation  Onset/Duration: 3-4 weeks  Description:  Frequency of bowel movements: daily-  Consistency of stool: loose  Gas andbloating  Progression of Symptoms: waxing and waning  Accompanying signs and symptoms:    Abdominal pain: YES- aches in on low abdomen   Rectal pain: No   Blood in stool: No, not this time   Nausea/Vomiting: YES- nausea   Weight loss or gain: YES loss  History:   Similar problems in past: YES- seen in  2022  History of abdominal surgery: YES- louann and   Chronic laxative use: No  New medications: No  Precipitating or alleviating factors: lactose  Therapies tried and outcome: food map-noticed worse with lactose  Tried laxative and doesn't feel like it comes out    Feels she's impacted   Feels the same as when she started MyChart messaged provider  Has had this issue in the past and went away  Having low back  pain  Was having some bloody stool after susan   No fevers      Review of Systems   Gastrointestinal: Positive for abdominal pain.      Constitutional, HEENT, cardiovascular, pulmonary, gi and gu systems are negative, except as otherwise noted.      Objective    /78   Pulse 85   Temp 97.5  F (36.4  C)   Resp 24   Wt 108.8 kg (239 lb 12.8 oz)   LMP 07/01/2002   SpO2 97%   BMI 37.00 kg/m    Body mass index is 37 kg/m .  Physical Exam   GENERAL APPEARANCE: healthy, alert and no distress  RESP: lungs clear to auscultation - no rales, rhonchi or wheezes  CV: regular rates and rhythm, normal S1 S2, no S3 or S4 and no murmur, click or rub  ABDOMEN: soft, nontender, without hepatosplenomegaly or masses and bowel sounds normal  MS: extremities normal- no gross deformities noted and peripheral pulses normal  SKIN: no suspicious lesions or rashes  NEURO: Normal strength and tone, mentation intact and speech normal  PSYCH: mentation appears normal and affect normal/bright    Diagnostic Test Results:  Xray - abdominal, read by radiologist and reviewed by this provider indicating no obstruction and moderate amount of colonic stool          Chart documentation with Dragon Voice recognition Software. Although reviewed after completion, some words and grammatical errors may remain.

## 2023-01-13 NOTE — PATIENT INSTRUCTIONS
Constipation, unspecified constipation type  Ongoing, intermittent chronic constipation. More bloating. No abdominal tenderness on exam today. Abdominal x-ray obtained in office today, already read by radiologist indicating no obstruction and moderate amount of stool in colon.  Has had cleanout done in the past, has colonoscopy scheduled at the end of February for further evaluation.  Patient currently following the FODMAP diet which I would encourage her to continue to follow.  Recommend cleanout today and increase fluid intake.  Cleanout instructions as below.  If symptoms not improved after cleanout, patient to notify provider.    Start a clear liquid diet this evening.  A clear liquid diet consists of soda, juices without pulp, broth, Jell-O, Popsicles, Italian ice, hard candies.  Pretty much anything you can see through!!  (NO dairy products or solid food.)    In the evening start with a 238 g (8.3 oz bottle) or 255 g (8.9 oz bottle [generic]) mixed in ~1,900 mL (64 ounces) of Gatorade  until dissolved (lemon-lime is typically used; do not use red)   Drink 240 mL (8 oz) every 10 minutes for 4 doses until 960 mL (32 oz) is consumed.    The following morning IF NO bowel movement  drink the remainder 240 mL (8 oz) every 10 minutes for 4 doses until 960 mL (32 ounces) is consumed.   - XR Abdomen 2 Views; Future    LLQ abdominal pain  Intermittent left lower quadrant abdominal pain accompanied by nausea intermittently.  Denies any fever.  No tenderness on exam today.  Abdominal x-ray as above.  Would recommend following through with colonoscopy at the end of next month and following recommendations as above.  - XR Abdomen 2 Views; Future    Bloating  Bloating accompanied by symptoms as above.

## 2023-02-03 ENCOUNTER — ANESTHESIA EVENT (OUTPATIENT)
Dept: GASTROENTEROLOGY | Facility: CLINIC | Age: 68
End: 2023-02-03
Payer: COMMERCIAL

## 2023-02-03 RX ORDER — FENTANYL CITRATE 50 UG/ML
25 INJECTION, SOLUTION INTRAMUSCULAR; INTRAVENOUS
Status: CANCELLED | OUTPATIENT
Start: 2023-02-03

## 2023-02-03 RX ORDER — OXYCODONE HYDROCHLORIDE 5 MG/1
10 TABLET ORAL EVERY 4 HOURS PRN
Status: CANCELLED | OUTPATIENT
Start: 2023-02-03

## 2023-02-03 RX ORDER — OXYCODONE HYDROCHLORIDE 5 MG/1
5 TABLET ORAL EVERY 4 HOURS PRN
Status: CANCELLED | OUTPATIENT
Start: 2023-02-03

## 2023-02-03 NOTE — ANESTHESIA PREPROCEDURE EVALUATION
Anesthesia Pre-Procedure Evaluation    Patient: Donita Lerner   MRN: [id number] : 1955        Procedure : Procedure(s):  COLONOSCOPY  ESOPHAGOGASTRODUODENOSCOPY (EGD)          Past Medical History:   Diagnosis Date     Arthritis      Depressive disorder, not elsewhere classified      Generalized anxiety disorder      Hypertension     No cardiologist     Meniere's disease 2005     Problem list name updated by automated process. Provider to review     Meniere's disease, unspecified      Sensorineural hearing loss, unspecified     left ear since birth complete     Unspecified episodic mood disorder     depression worsens winter SADD      Past Surgical History:   Procedure Laterality Date     ADENOIDECTOMY  1967     ARTHROPLASTY KNEE  2013    Procedure: ARTHROPLASTY KNEE;  Right Total Knee Arthroplasty  ;  Surgeon: Ganesh Sheikh MD;  Location: [facility] OR     ARTHROPLASTY KNEE UNICOMPARTMENT Left 2016    Procedure: ARTHROPLASTY KNEE UNICOMPARTMENT;  Surgeon: Ganesh Sheikh MD;  Location: [facility] OR     C ANESTH, SECTION      x3     IR LYMPH NODE BIOPSY  10/14/2021     LAPAROSCOPIC CHOLECYSTECTOMY WITH CHOLANGIOGRAMS N/A 2018    Procedure: LAPAROSCOPIC CHOLECYSTECTOMY WITH CHOLANGIOGRAMS;  Laparoscopic Cholecystectomy with Intraoperative Cholangiogram;  Surgeon: Erick Rivera MD;  Location: [facility]     TONSILLECTOMY        Allergies   Allergen Reactions     Penicillins Anaphylaxis and Swelling     Lisinopril Cough     Sulfa Drugs Rash      Social History     Tobacco Use     Smoking status: Never     Smokeless tobacco: Never   Substance Use Topics     Alcohol use: No      Wt Readings from Last 1 Encounters:   23 108.8 kg (239 lb 12.8 oz)        Anesthesia Evaluation   Pt has had prior anesthetic.         ROS/MED HX  ENT/Pulmonary:       Neurologic:       Cardiovascular:     (+) hypertension-----    METS/Exercise Tolerance:     Hematologic:        Musculoskeletal:   (+) arthritis,     GI/Hepatic:       Renal/Genitourinary:       Endo:     (+) Obesity,     Psychiatric/Substance Use:     (+) psychiatric history anxiety, depression and other (comment)     Infectious Disease:       Malignancy:       Other:            Physical Exam    Airway  airway exam normal      Mallampati: II   TM distance: > 3 FB   Neck ROM: full   Mouth opening: > 3 cm    Respiratory Devices and Support         Dental       (+) Minor Abnormalities - some fillings, tiny chips      Cardiovascular   cardiovascular exam normal          Pulmonary   pulmonary exam normal                OUTSIDE LABS:  CBC:   Lab Results   Component Value Date    WBC 9.9 12/28/2022    WBC 6.2 10/14/2021    HGB 14.6 12/28/2022    HGB 13.9 10/14/2021    HCT 44.4 12/28/2022    HCT 43.3 10/14/2021     12/28/2022     10/14/2021     BMP:   Lab Results   Component Value Date     11/05/2021     09/10/2021    POTASSIUM 4.3 11/05/2021    POTASSIUM 3.4 09/10/2021    CHLORIDE 108 11/05/2021    CHLORIDE 108 09/10/2021    CO2 27 11/05/2021    CO2 26 09/10/2021    BUN 20 11/05/2021    BUN 18 09/10/2021    CR 0.82 11/05/2021    CR 0.76 09/10/2021    GLC 76 11/05/2021    GLC 95 09/10/2021     COAGS:   Lab Results   Component Value Date    PTT 30 10/14/2021    INR 1.08 10/14/2021     POC:   Lab Results   Component Value Date    BGM 92 06/17/2013    HCG Negative 07/24/2010    HCGS Negative 10/28/2007     HEPATIC:   Lab Results   Component Value Date    ALBUMIN 3.2 (L) 09/07/2021    PROTTOTAL 7.3 09/07/2021    ALT 56 (H) 09/07/2021    AST 36 09/07/2021    ALKPHOS 149 09/07/2021    BILITOTAL 0.7 09/07/2021     OTHER:   Lab Results   Component Value Date    LACT 1.2 09/08/2021    A1C 5.5 03/28/2016    RHETT 9.6 11/05/2021    PHOS 4.1 03/22/2007    MAG 1.9 09/10/2021    LIPASE 152 04/27/2018    TSH 0.74 04/21/2017    CRP 78.0 (H) 09/10/2021    SED 38 (H) 09/08/2021       Anesthesia Plan    ASA Status:  3   NPO  Status:  NPO Appropriate    Anesthesia Type: General.   Induction: Propofol, Intravenous.   Maintenance: TIVA.        Consents    Anesthesia Plan(s) and associated risks, benefits, and realistic alternatives discussed. Questions answered and patient/representative(s) expressed understanding.     - Discussed: Risks, Benefits and Alternatives for BOTH SEDATION and the PROCEDURE were discussed     - Discussed with:  Patient         Postoperative Care    Pain management: Multi-modal analgesia, IV analgesics, Oral pain medications.   PONV prophylaxis: Ondansetron (or other 5HT-3), Dexamethasone or Solumedrol, Background Propofol Infusion     Comments:                Alex Branch CRNA, APRN CRNA

## 2023-02-15 RX ORDER — BISACODYL 5 MG
TABLET, DELAYED RELEASE (ENTERIC COATED) ORAL
Qty: 4 TABLET | Refills: 0 | Status: SHIPPED | OUTPATIENT
Start: 2023-02-15 | End: 2023-03-01

## 2023-02-22 ENCOUNTER — ANESTHESIA (OUTPATIENT)
Dept: GASTROENTEROLOGY | Facility: CLINIC | Age: 68
End: 2023-02-22
Payer: COMMERCIAL

## 2023-02-22 DIAGNOSIS — I10 ESSENTIAL HYPERTENSION, BENIGN: ICD-10-CM

## 2023-02-22 RX ORDER — BISACODYL 5 MG
TABLET, DELAYED RELEASE (ENTERIC COATED) ORAL
Qty: 4 TABLET | Refills: 0 | Status: SHIPPED | OUTPATIENT
Start: 2023-02-22 | End: 2023-03-01

## 2023-02-22 RX ORDER — METOPROLOL SUCCINATE 25 MG/1
TABLET, EXTENDED RELEASE ORAL
Qty: 90 TABLET | Refills: 3 | Status: SHIPPED | OUTPATIENT
Start: 2023-02-22 | End: 2024-03-19

## 2023-03-01 ENCOUNTER — HOSPITAL ENCOUNTER (OUTPATIENT)
Facility: CLINIC | Age: 68
Discharge: HOME | End: 2023-03-01
Attending: SURGERY | Admitting: SURGERY
Payer: COMMERCIAL

## 2023-03-01 VITALS
WEIGHT: 240 LBS | OXYGEN SATURATION: 97 % | SYSTOLIC BLOOD PRESSURE: 110 MMHG | TEMPERATURE: 98.2 F | HEART RATE: 90 BPM | DIASTOLIC BLOOD PRESSURE: 76 MMHG | RESPIRATION RATE: 15 BRPM | BODY MASS INDEX: 37.67 KG/M2 | HEIGHT: 67 IN

## 2023-03-01 DIAGNOSIS — Z12.11 SPECIAL SCREENING FOR MALIGNANT NEOPLASMS, COLON: Primary | ICD-10-CM

## 2023-03-01 LAB
COLONOSCOPY: NORMAL
UPPER GI ENDOSCOPY: NORMAL

## 2023-03-01 PROCEDURE — 45380 COLONOSCOPY AND BIOPSY: CPT | Performed by: SURGERY

## 2023-03-01 PROCEDURE — 370N000017 HC ANESTHESIA TECHNICAL FEE, PER MIN: Performed by: SURGERY

## 2023-03-01 PROCEDURE — 43239 EGD BIOPSY SINGLE/MULTIPLE: CPT | Mod: 51 | Performed by: SURGERY

## 2023-03-01 PROCEDURE — 43239 EGD BIOPSY SINGLE/MULTIPLE: CPT | Performed by: SURGERY

## 2023-03-01 PROCEDURE — 250N000009 HC RX 250: Performed by: NURSE ANESTHETIST, CERTIFIED REGISTERED

## 2023-03-01 PROCEDURE — 88305 TISSUE EXAM BY PATHOLOGIST: CPT | Mod: TC | Performed by: SURGERY

## 2023-03-01 PROCEDURE — 250N000011 HC RX IP 250 OP 636: Performed by: NURSE ANESTHETIST, CERTIFIED REGISTERED

## 2023-03-01 PROCEDURE — 250N000009 HC RX 250: Performed by: SURGERY

## 2023-03-01 PROCEDURE — 258N000003 HC RX IP 258 OP 636

## 2023-03-01 RX ORDER — NALOXONE HYDROCHLORIDE 0.4 MG/ML
0.2 INJECTION, SOLUTION INTRAMUSCULAR; INTRAVENOUS; SUBCUTANEOUS
Status: CANCELLED | OUTPATIENT
Start: 2023-03-01

## 2023-03-01 RX ORDER — LIDOCAINE HYDROCHLORIDE 10 MG/ML
INJECTION, SOLUTION INFILTRATION; PERINEURAL PRN
Status: DISCONTINUED | OUTPATIENT
Start: 2023-03-01 | End: 2023-03-01

## 2023-03-01 RX ORDER — FLUMAZENIL 0.1 MG/ML
0.2 INJECTION, SOLUTION INTRAVENOUS
Status: CANCELLED | OUTPATIENT
Start: 2023-03-01 | End: 2023-03-02

## 2023-03-01 RX ORDER — NALOXONE HYDROCHLORIDE 0.4 MG/ML
0.4 INJECTION, SOLUTION INTRAMUSCULAR; INTRAVENOUS; SUBCUTANEOUS
Status: CANCELLED | OUTPATIENT
Start: 2023-03-01

## 2023-03-01 RX ORDER — SODIUM CHLORIDE, SODIUM LACTATE, POTASSIUM CHLORIDE, CALCIUM CHLORIDE 600; 310; 30; 20 MG/100ML; MG/100ML; MG/100ML; MG/100ML
INJECTION, SOLUTION INTRAVENOUS CONTINUOUS
Status: DISCONTINUED | OUTPATIENT
Start: 2023-03-01 | End: 2023-03-01 | Stop reason: HOSPADM

## 2023-03-01 RX ORDER — LIDOCAINE 40 MG/G
CREAM TOPICAL
Status: DISCONTINUED | OUTPATIENT
Start: 2023-03-01 | End: 2023-03-01 | Stop reason: HOSPADM

## 2023-03-01 RX ORDER — PROPOFOL 10 MG/ML
INJECTION, EMULSION INTRAVENOUS PRN
Status: DISCONTINUED | OUTPATIENT
Start: 2023-03-01 | End: 2023-03-01

## 2023-03-01 RX ORDER — PROPOFOL 10 MG/ML
INJECTION, EMULSION INTRAVENOUS CONTINUOUS PRN
Status: DISCONTINUED | OUTPATIENT
Start: 2023-03-01 | End: 2023-03-01

## 2023-03-01 RX ORDER — GLYCOPYRROLATE 0.2 MG/ML
INJECTION, SOLUTION INTRAMUSCULAR; INTRAVENOUS PRN
Status: DISCONTINUED | OUTPATIENT
Start: 2023-03-01 | End: 2023-03-01

## 2023-03-01 RX ORDER — ONDANSETRON 2 MG/ML
4 INJECTION INTRAMUSCULAR; INTRAVENOUS
Status: DISCONTINUED | OUTPATIENT
Start: 2023-03-01 | End: 2023-03-01 | Stop reason: HOSPADM

## 2023-03-01 RX ADMIN — LIDOCAINE HYDROCHLORIDE 100 MG: 10 INJECTION, SOLUTION INFILTRATION; PERINEURAL at 11:46

## 2023-03-01 RX ADMIN — PROPOFOL 125 MCG/KG/MIN: 10 INJECTION, EMULSION INTRAVENOUS at 11:46

## 2023-03-01 RX ADMIN — LIDOCAINE HYDROCHLORIDE 0.1 ML: 10 INJECTION, SOLUTION EPIDURAL; INFILTRATION; INTRACAUDAL; PERINEURAL at 11:41

## 2023-03-01 RX ADMIN — PROPOFOL 50 MG: 10 INJECTION, EMULSION INTRAVENOUS at 11:46

## 2023-03-01 RX ADMIN — SODIUM CHLORIDE, POTASSIUM CHLORIDE, SODIUM LACTATE AND CALCIUM CHLORIDE: 600; 310; 30; 20 INJECTION, SOLUTION INTRAVENOUS at 11:41

## 2023-03-01 RX ADMIN — GLYCOPYRROLATE 0.1 MG: 0.2 INJECTION, SOLUTION INTRAMUSCULAR; INTRAVENOUS at 11:46

## 2023-03-01 ASSESSMENT — ACTIVITIES OF DAILY LIVING (ADL)
ADLS_ACUITY_SCORE: 35
ADLS_ACUITY_SCORE: 35

## 2023-03-01 NOTE — ANESTHESIA POSTPROCEDURE EVALUATION
Patient: Donita Lerner    Procedure: Procedure(s):  COLONOSCOPY, WITH POLYPECTOMY AND BIOPSY  ESOPHAGOGASTRODUODENOSCOPY, WITH BIOPSY       Anesthesia Type:  General    Note:  Disposition: Outpatient   Postop Pain Control: Uneventful            Sign Out: Well controlled pain   PONV: No   Neuro/Psych: Uneventful            Sign Out: Acceptable/Baseline neuro status   Airway/Respiratory: Uneventful            Sign Out: Acceptable/Baseline resp. status   CV/Hemodynamics: Uneventful            Sign Out: Acceptable CV status; No obvious hypovolemia; No obvious fluid overload   Other NRE: NONE   DID A NON-ROUTINE EVENT OCCUR? No           Last vitals:  Vitals:    03/01/23 1031   BP: 119/73   Resp: 18   Temp: 36.7  C (98  F)   SpO2: 98%       Electronically Signed By: TERRA Martinez CRNA  March 1, 2023  12:12 PM

## 2023-03-01 NOTE — ANESTHESIA CARE TRANSFER NOTE
Patient: Donita Lerner    Procedure: Procedure(s):  COLONOSCOPY, WITH POLYPECTOMY AND BIOPSY  ESOPHAGOGASTRODUODENOSCOPY, WITH BIOPSY       Diagnosis: Constipation, unspecified constipation type [K59.00]  GERD (gastroesophageal reflux disease) [K21.9]  Diagnosis Additional Information: No value filed.    Anesthesia Type:   General     Note:    Oropharynx: oropharynx clear of all foreign objects and spontaneously breathing  Level of Consciousness: awake  Oxygen Supplementation: room air    Independent Airway: airway patency satisfactory and stable  Dentition: dentition unchanged  Vital Signs Stable: post-procedure vital signs reviewed and stable  Report to RN Given: handoff report given  Patient transferred to: Phase II    Handoff Report: Identifed the Patient, Identified the Reponsible Provider, Reviewed the pertinent medical history, Discussed the surgical course, Reviewed Intra-OP anesthesia mangement and issues during anesthesia, Set expectations for post-procedure period and Allowed opportunity for questions and acknowledgement of understanding      Vitals:  Vitals Value Taken Time   BP     Temp     Pulse     Resp     SpO2         Electronically Signed By: TERRA Martinez CRNA  March 1, 2023  12:12 PM

## 2023-03-01 NOTE — LETTER
Donita Lerner  [id number] [facility] [address]      March 8, 2023    Dear Donita,  This letter is written to inform you of the results of your recent colonoscopy.  Your examination showed no abnormalities.     Final Diagnosis   A: Small intestine, duodenum, biopsy:  - Small bowel mucosa without diagnostic abnormality.  - No diagnostic histologic features of celiac disease/gluten-sensitive enteropathy, Whipple's disease, or Giardia microorganisms.  - Negative for dysplasia or malignancy.     B: Stomach, antrum, biopsy:  - Gastric body mucosa without diagnostic abnormality.  - Negative for Helicobacter pylori on H&E examination.      C: Esophagus, mid, biopsy:  - Esophageal squamous mucosa without diagnostic abnormality.  - Negative for intraepithelial eosinophils.     D: Large intestine, random, biopsy:  - Colonic mucosa without diagnostic abnormality.  - No sufficient features of colitis (microscopic or otherwise), polyp, dysplasia, or malignancy.        Given these findings,  I recommend that you undergo a repeat colonoscopy in 10 year(s) for surveillance. We will enter you into a recall system so you receive a reminder closer to the time that you are due for repeat examination.     Please remember that this recommendation is made with the understanding that you are not experiencing persistent changes in bowel function, bleeding per rectum, and/or significant abdominal pain. If you experience these symptoms, please contact your primary care provider for a further evaluation.     If you have any questions or concerns about the results of your colonoscopy or the appropriate follow-up, please contact my assistant at [phone]    Sincerely,      Kraig Vides,    [facility]  ___

## 2023-03-01 NOTE — H&P
67 year old year old female here for colonoscopy for abdominal pain and constipation.  Patient thinks she may have IBS.  Also admits reflux and atypical chest pain.  No known colon cancer in family.    Patient Active Problem List   Diagnosis     Generalized anxiety disorder     Symptomatic menopausal or female climacteric states     Sensorineural hearing loss     CNS DISORDER -gliosis on mri     S/P total knee arthroplasty     Advanced care planning/counseling discussion     Abnormal MRI of head     Essential hypertension      obesity, unspecified obesity type (HCC)     Major depressive disorder, single episode, mild (H)     S/P left unicompartmental knee replacement     Osteoporosis     Seasonal affective disorder (H)     Calculus of gallbladder without cholecystitis     S/P laparoscopic cholecystectomy     Neck abscess       Past Medical History:   Diagnosis Date     Arthritis      Depressive disorder, not elsewhere classified      Generalized anxiety disorder      Hypertension     No cardiologist     Meniere's disease 2005     Problem list name updated by automated process. Provider to review     Meniere's disease, unspecified      Sensorineural hearing loss, unspecified     left ear since birth complete     Unspecified episodic mood disorder     depression worsens winter SADD       Past Surgical History:   Procedure Laterality Date     ADENOIDECTOMY  1967     ARTHROPLASTY KNEE  2013    Procedure: ARTHROPLASTY KNEE;  Right Total Knee Arthroplasty  ;  Surgeon: Ganesh Sheikh MD;  Location: [facility]     ARTHROPLASTY KNEE UNICOMPARTMENT Left 2016    Procedure: ARTHROPLASTY KNEE UNICOMPARTMENT;  Surgeon: Ganesh Sheikh MD;  Location: [facility]     C ANESTH, SECTION      x3     IR LYMPH NODE BIOPSY  10/14/2021     LAPAROSCOPIC CHOLECYSTECTOMY WITH CHOLANGIOGRAMS N/A 2018    Procedure: LAPAROSCOPIC CHOLECYSTECTOMY WITH CHOLANGIOGRAMS;  Laparoscopic Cholecystectomy with  Intraoperative Cholangiogram;  Surgeon: Erick Rivera MD;  Location: [facility]     TONSILLECTOMY  1967       Family History   Problem Relation Age of Onset     Cancer Mother         Lymphoma     Depression Mother      Cancer Father         Carcinoma Mesothelioma ([place])     Psychotic Disorder Daughter         borderline personality disorder     Depression Brother        Current Outpatient Rx   Medication Sig Dispense Refill     bisacodyl (DULCOLAX) 5 MG EC tablet 2 days prior to procedure, take 2 tablets at 4 pm. 1 day prior to procedure, take 2 tablets at 4 pm. For additional instructions refer to your colonoscopy prep instructions. 4 tablet 0     bisacodyl (DULCOLAX) 5 MG EC tablet 2 days prior to procedure, take 2 tablets at 4 pm. 1 day prior to procedure, take 2 tablets at 4 pm. For additional instructions refer to your colonoscopy prep instructions. 4 tablet 0     polyethylene glycol (GOLYTELY) 236 g suspension 2 days prior at 5pm, mix and drink half of a jug of Golytely. Drink an 8 oz. glass of Golytely every 15 minutes until half of the jug is gone. Place remainder of Golytely in the refrigerator. 1 day prior at 5 pm, drink the 2nd half of a jug of Golytely bowel prep. 6 hours before your check-in time, drink an 8 oz. glass of Golytely every 15 minutes until half of the 2nd jug of Golytely is gone. Discard remainder of second jug. 8000 mL 0     polyethylene glycol (GOLYTELY) 236 g suspension 2 days prior at 5pm, mix and drink half of a jug of Golytely. Drink an 8 oz. glass of Golytely every 15 minutes until half of the jug is gone. Place remainder of Golytely in the refrigerator. 1 day prior at 5 pm, drink the 2nd half of a jug of Golytely bowel prep. 6 hours before your check-in time, drink an 8 oz. glass of Golytely every 15 minutes until half of the 2nd jug of Golytely is gone. Discard remainder of second jug. 8000 mL 0       Allergies   Allergen Reactions     Penicillins Anaphylaxis and Swelling      "Lisinopril Cough     Sulfa Drugs Rash       Pt reports that she has never smoked. She has never used smokeless tobacco. She reports that she does not drink alcohol and does not use drugs.    Exam:  /73   Temp 98  F (36.7  C)   Resp 18   Ht 1.702 m (5' 7\")   Wt 108.9 kg (240 lb)   LMP 07/01/2002   SpO2 98%   BMI 37.59 kg/m      Awake, Alert OX3  Lungs - CTA bilaterally  CV - RRR, no murmurs, distal pulses intact  Abd - soft, non-distended, non-tender, +BS  Extr - No cyanosis or edema    A/P 67 year old year old female in need of colonoscopy for abdominal pain, constipation and upper endoscopy for reflux. Risks, benefits, alternatives, and complications were discussed including the possibility of perforation, bleeding, missed lesion and the patient agreed to proceed.    Kraig Vides, DO on 3/1/2023 at 11:36 AM      "

## 2023-03-02 LAB
PATH REPORT.COMMENTS IMP SPEC: NORMAL
PATH REPORT.COMMENTS IMP SPEC: NORMAL
PATH REPORT.FINAL DX SPEC: NORMAL
PATH REPORT.GROSS SPEC: NORMAL
PATH REPORT.MICROSCOPIC SPEC OTHER STN: NORMAL
PATH REPORT.RELEVANT HX SPEC: NORMAL
PHOTO IMAGE: NORMAL

## 2023-03-02 PROCEDURE — 88305 TISSUE EXAM BY PATHOLOGIST: CPT | Mod: 26 | Performed by: PATHOLOGY

## 2023-04-05 ENCOUNTER — MYC MEDICAL ADVICE (OUTPATIENT)
Dept: FAMILY MEDICINE | Facility: CLINIC | Age: 68
End: 2023-04-05
Payer: COMMERCIAL

## 2023-04-12 ASSESSMENT — ENCOUNTER SYMPTOMS
SORE THROAT: 0
CONSTIPATION: 0
PALPITATIONS: 0
ARTHRALGIAS: 0
PARESTHESIAS: 0
EYE PAIN: 0
MYALGIAS: 0
NERVOUS/ANXIOUS: 0
DIARRHEA: 0
JOINT SWELLING: 0
HEADACHES: 0
SHORTNESS OF BREATH: 0
DYSURIA: 0
HEMATURIA: 0
HEMATOCHEZIA: 0
DIZZINESS: 0
HEARTBURN: 0
CHILLS: 0
ABDOMINAL PAIN: 0
BREAST MASS: 0
COUGH: 0
FREQUENCY: 0
WEAKNESS: 0
NAUSEA: 0

## 2023-04-12 ASSESSMENT — ACTIVITIES OF DAILY LIVING (ADL): CURRENT_FUNCTION: NO ASSISTANCE NEEDED

## 2023-04-19 ENCOUNTER — OFFICE VISIT (OUTPATIENT)
Dept: FAMILY MEDICINE | Facility: CLINIC | Age: 68
End: 2023-04-19
Payer: COMMERCIAL

## 2023-04-19 VITALS
DIASTOLIC BLOOD PRESSURE: 88 MMHG | RESPIRATION RATE: 18 BRPM | SYSTOLIC BLOOD PRESSURE: 118 MMHG | WEIGHT: 230.6 LBS | HEART RATE: 73 BPM | OXYGEN SATURATION: 99 % | HEIGHT: 68 IN | TEMPERATURE: 97.7 F | BODY MASS INDEX: 34.95 KG/M2

## 2023-04-19 DIAGNOSIS — I10 ESSENTIAL HYPERTENSION: ICD-10-CM

## 2023-04-19 DIAGNOSIS — H61.23 BILATERAL IMPACTED CERUMEN: ICD-10-CM

## 2023-04-19 DIAGNOSIS — Z12.31 ENCOUNTER FOR SCREENING MAMMOGRAM FOR BREAST CANCER: ICD-10-CM

## 2023-04-19 DIAGNOSIS — E66.01 MORBID OBESITY, UNSPECIFIED OBESITY TYPE (H): ICD-10-CM

## 2023-04-19 DIAGNOSIS — Z00.00 ENCOUNTER FOR MEDICARE ANNUAL WELLNESS EXAM: Primary | ICD-10-CM

## 2023-04-19 DIAGNOSIS — F41.1 GENERALIZED ANXIETY DISORDER: ICD-10-CM

## 2023-04-19 DIAGNOSIS — F32.0 MAJOR DEPRESSIVE DISORDER, SINGLE EPISODE, MILD (H): ICD-10-CM

## 2023-04-19 LAB
ANION GAP SERPL CALCULATED.3IONS-SCNC: 10 MMOL/L (ref 7–15)
BUN SERPL-MCNC: 22.5 MG/DL (ref 8–23)
CALCIUM SERPL-MCNC: 9.8 MG/DL (ref 8.8–10.2)
CHLORIDE SERPL-SCNC: 105 MMOL/L (ref 98–107)
CREAT SERPL-MCNC: 1.07 MG/DL (ref 0.51–0.95)
DEPRECATED HCO3 PLAS-SCNC: 25 MMOL/L (ref 22–29)
GFR SERPL CREATININE-BSD FRML MDRD: 57 ML/MIN/1.73M2
GLUCOSE SERPL-MCNC: 99 MG/DL (ref 70–99)
POTASSIUM SERPL-SCNC: 4.6 MMOL/L (ref 3.4–5.3)
SODIUM SERPL-SCNC: 140 MMOL/L (ref 136–145)

## 2023-04-19 PROCEDURE — G0439 PPPS, SUBSEQ VISIT: HCPCS | Performed by: NURSE PRACTITIONER

## 2023-04-19 PROCEDURE — 99214 OFFICE O/P EST MOD 30 MIN: CPT | Mod: 25 | Performed by: NURSE PRACTITIONER

## 2023-04-19 PROCEDURE — 69209 REMOVE IMPACTED EAR WAX UNI: CPT | Mod: 50 | Performed by: NURSE PRACTITIONER

## 2023-04-19 PROCEDURE — 80048 BASIC METABOLIC PNL TOTAL CA: CPT | Performed by: NURSE PRACTITIONER

## 2023-04-19 PROCEDURE — 36415 COLL VENOUS BLD VENIPUNCTURE: CPT | Performed by: NURSE PRACTITIONER

## 2023-04-19 RX ORDER — AMLODIPINE BESYLATE 5 MG/1
TABLET ORAL
Qty: 135 TABLET | Refills: 3 | Status: SHIPPED | OUTPATIENT
Start: 2023-04-19 | End: 2023-05-30

## 2023-04-19 RX ORDER — BUPROPION HYDROCHLORIDE 300 MG/1
300 TABLET ORAL EVERY MORNING
Qty: 90 TABLET | Refills: 2 | Status: SHIPPED | OUTPATIENT
Start: 2023-04-19 | End: 2023-05-12

## 2023-04-19 RX ORDER — ESCITALOPRAM OXALATE 20 MG/1
20 TABLET ORAL DAILY
Qty: 90 TABLET | Refills: 2 | Status: SHIPPED | OUTPATIENT
Start: 2023-04-19 | End: 2023-05-15

## 2023-04-19 ASSESSMENT — ACTIVITIES OF DAILY LIVING (ADL): CURRENT_FUNCTION: NO ASSISTANCE NEEDED

## 2023-04-19 ASSESSMENT — ENCOUNTER SYMPTOMS
HEARTBURN: 0
ABDOMINAL PAIN: 0
PARESTHESIAS: 0
PALPITATIONS: 0
DYSURIA: 0
WEAKNESS: 0
FREQUENCY: 0
CONSTIPATION: 0
SHORTNESS OF BREATH: 0
ARTHRALGIAS: 0
HEMATOCHEZIA: 0
SORE THROAT: 0
MYALGIAS: 0
NAUSEA: 0
CHILLS: 0
HEMATURIA: 0
DIARRHEA: 0
HEADACHES: 0
DIZZINESS: 0
BREAST MASS: 0
COUGH: 0
NERVOUS/ANXIOUS: 0
JOINT SWELLING: 0
EYE PAIN: 0

## 2023-04-19 ASSESSMENT — PATIENT HEALTH QUESTIONNAIRE - PHQ9
SUM OF ALL RESPONSES TO PHQ QUESTIONS 1-9: 5
10. IF YOU CHECKED OFF ANY PROBLEMS, HOW DIFFICULT HAVE THESE PROBLEMS MADE IT FOR YOU TO DO YOUR WORK, TAKE CARE OF THINGS AT HOME, OR GET ALONG WITH OTHER PEOPLE: SOMEWHAT DIFFICULT
SUM OF ALL RESPONSES TO PHQ QUESTIONS 1-9: 5

## 2023-04-19 ASSESSMENT — PAIN SCALES - GENERAL: PAINLEVEL: NO PAIN (0)

## 2023-04-19 NOTE — PROGRESS NOTES
"  The patient's PHQ-9 score is consistent with mild depression. She was provided with information regarding depression and was advised to schedule a follow up appointment in 53 weeks to further address this issue.  SUBJECTIVE:   Danika is a 67 year old who presents for Preventive Visit.       View : No data to display.              Patient has been advised of split billing requirements and indicates understanding: Yes  Are you in the first 12 months of your Medicare coverage?  No    Healthy Habits:     In general, how would you rate your overall health?  Good    Frequency of exercise:  1 day/week    Duration of exercise:  Less than 15 minutes    Do you usually eat at least 4 servings of fruit and vegetables a day, include whole grains    & fiber and avoid regularly eating high fat or \"junk\" foods?  No    Taking medications regularly:  Yes    Medication side effects:  None    Ability to successfully perform activities of daily living:  No assistance needed    Home Safety:  No safety concerns identified    Hearing Impairment:  Difficulty following a conversation in a noisy restaurant or crowded room, need to ask people to speak up or repeat themselves, difficulty understanding soft or whispered speech and difficulty understanding speech on the telephone    In the past 6 months, have you been bothered by leaking of urine?  No    In general, how would you rate your overall mental or emotional health?  Good      PHQ-2 Total Score: 1    Additional concerns today:  No      Have you ever done Advance Care Planning? (For example, a Health Directive, POLST, or a discussion with a medical provider or your loved ones about your wishes): No, advance care planning information given to patient to review.  Patient plans to discuss their wishes with loved ones or provider.      Joined [employer]   Stopped using Miralax and has started watching what she's eating      Fall risk  Fallen 2 or more times in the past year?: Yes  Any " fall with injury in the past year?: No    Cognitive Screening   1) Repeat 3 items (Leader, Season, Table)    2) Clock draw: NORMAL  3) 3 item recall: Recalls 2 objects   Results: NORMAL clock, 1-2 items recalled: COGNITIVE IMPAIRMENT LESS LIKELY    Mini-CogTM Copyright GAEL Albarado. Licensed by the author for use in [facility]; reprinted with permission (greyson@[facility]). All rights reserved.      Do you have sleep apnea, excessive snoring or daytime drowsiness?: no    Reviewed and updated as needed this visit by clinical staff   Tobacco  Allergies  Meds  Problems  Med Hx  Surg Hx  Fam Hx          Reviewed and updated as needed this visit by Provider   Tobacco  Allergies  Meds  Problems  Med Hx  Surg Hx  Fam Hx         Social History     Tobacco Use     Smoking status: Never     Smokeless tobacco: Never   Vaping Use     Vaping status: Never Used   Substance Use Topics     Alcohol use: No             4/12/2023     9:24 AM   Alcohol Use   Prescreen: >3 drinks/day or >7 drinks/week? Not Applicable          View : No data to display.              Do you have a current opioid prescription? No  Do you use any other controlled substances or medications that are not prescribed by a provider? None      Current providers sharing in care for this patient include:   Patient Care Team:  Annika Griffin APRN CNP as PCP - General (Nurse Practitioner - Family)  Annika Griffin APRN CNP as Assigned PCP  Matthias Avelar DPM as Assigned Surgical Provider    The following health maintenance items are reviewed in Epic and correct as of today:  Health Maintenance   Topic Date Due     Pneumococcal Vaccine: 65+ Years (1 - PCV) Never done     DTAP/TDAP/TD IMMUNIZATION (2 - Td or Tdap) 10/06/2021     COVID-19 Vaccine (4 - Booster for Moderna series) 03/18/2022     INFLUENZA VACCINE (1) 09/01/2022     ANNUAL REVIEW OF HM ORDERS  02/23/2023     MEDICARE ANNUAL WELLNESS VISIT  04/29/2023     MAMMO SCREENING   2023     PHQ-9  10/19/2023     LIPID  2024     FALL RISK ASSESSMENT  2024     DEXA  2024     ADVANCE CARE PLANNING  2028     COLORECTAL CANCER SCREENING  2033     HEPATITIS C SCREENING  Completed     DEPRESSION ACTION PLAN  Completed     ZOSTER IMMUNIZATION  Completed     IPV IMMUNIZATION  Aged Out     MENINGITIS IMMUNIZATION  Aged Out     PAP  Discontinued     Lab work is in process  Labs reviewed in EPIC  BP Readings from Last 3 Encounters:   23 118/88   23 110/76   23 120/78    Wt Readings from Last 3 Encounters:   23 104.6 kg (230 lb 9.6 oz)   23 108.9 kg (240 lb)   23 108.8 kg (239 lb 12.8 oz)                  Patient Active Problem List   Diagnosis     Generalized anxiety disorder     Symptomatic menopausal or female climacteric states     Sensorineural hearing loss     CNS DISORDER -gliosis on mri     S/P total knee arthroplasty     Advanced care planning/counseling discussion     Abnormal MRI of head     Essential hypertension      obesity, unspecified obesity type (HCC)     Major depressive disorder, single episode, mild (H)     S/P left unicompartmental knee replacement     Osteoporosis     Seasonal affective disorder (H)     Calculus of gallbladder without cholecystitis     S/P laparoscopic cholecystectomy     Neck abscess     Past Surgical History:   Procedure Laterality Date     ADENOIDECTOMY  1967     ARTHROPLASTY KNEE  2013    Procedure: ARTHROPLASTY KNEE;  Right Total Knee Arthroplasty  ;  Surgeon: Ganesh Sheikh MD;  Location: [facility]     ARTHROPLASTY KNEE UNICOMPARTMENT Left 2016    Procedure: ARTHROPLASTY KNEE UNICOMPARTMENT;  Surgeon: Ganesh Sheikh MD;  Location: [facility]     C ANESTH, SECTION      x3     COLONOSCOPY N/A 3/1/2023    Procedure: COLONOSCOPY, WITH POLYPECTOMY AND BIOPSY;  Surgeon: Kraig Vides, DO;  Location: [facility]     ESOPHAGOSCOPY, GASTROSCOPY, DUODENOSCOPY (EGD),  COMBINED N/A 3/1/2023    Procedure: ESOPHAGOGASTRODUODENOSCOPY, WITH BIOPSY;  Surgeon: Kraig Vides DO;  Location: [facility]     IR LYMPH NODE BIOPSY  10/14/2021     LAPAROSCOPIC CHOLECYSTECTOMY WITH CHOLANGIOGRAMS N/A 5/11/2018    Procedure: LAPAROSCOPIC CHOLECYSTECTOMY WITH CHOLANGIOGRAMS;  Laparoscopic Cholecystectomy with Intraoperative Cholangiogram;  Surgeon: Erick Rivera MD;  Location: [facility]     TONSILLECTOMY  1967       Social History     Tobacco Use     Smoking status: Never     Smokeless tobacco: Never   Vaping Use     Vaping status: Never Used   Substance Use Topics     Alcohol use: No     Family History   Problem Relation Age of Onset     Cancer Mother         Lymphoma     Depression Mother      Cancer Father         Carcinoma Mesothelioma ([place])     Psychotic Disorder Daughter         borderline personality disorder     Depression Brother          Current Outpatient Medications   Medication Sig Dispense Refill     amLODIPine (NORVASC) 5 MG tablet TAKE 1 AND 1/2 TABLETS (7.5MG) DAILY. 135 tablet 3     buPROPion (WELLBUTRIN XL) 300 MG 24 hr tablet Take 1 tablet (300 mg) by mouth every morning Profile Rx: patient will contact pharmacy when needed 90 tablet 2     Cholecalciferol (VITAMIN D) 2000 UNITS tablet Take 2,000 Units by mouth daily 100 tablet 3     escitalopram (LEXAPRO) 20 MG tablet Take 1 tablet (20 mg) by mouth daily 90 tablet 2     metoprolol succinate ER (TOPROL XL) 25 MG 24 hr tablet TAKE 1 TABLET DAILY 90 tablet 3     Allergies   Allergen Reactions     Penicillins Anaphylaxis and Swelling     Lisinopril Cough     Sulfa Drugs Rash     Mammogram Screening: Mammogram Screening: Recommended mammography every 1-2 years with patient discussion and risk factor consideration        3/15/2021     8:08 AM   Breast CA Risk Assessment (FHS-7)   Do you have a family history of breast, colon, or ovarian cancer? No / Unknown     Mammogram Screening: Recommended mammography every 1-2 years  "with patient discussion and risk factor consideration  Pertinent mammograms are reviewed under the imaging tab.    Review of Systems   Constitutional: Negative for chills.   HENT: Negative for congestion, ear pain, hearing loss and sore throat.    Eyes: Negative for pain and visual disturbance.   Respiratory: Negative for cough and shortness of breath.    Cardiovascular: Negative for chest pain, palpitations and peripheral edema.   Gastrointestinal: Negative for abdominal pain, constipation, diarrhea, heartburn, hematochezia and nausea.   Breasts:  Negative for tenderness, breast mass and discharge.   Genitourinary: Negative for dysuria, frequency, genital sores, hematuria, pelvic pain, urgency, vaginal bleeding and vaginal discharge.   Musculoskeletal: Negative for arthralgias, joint swelling and myalgias.   Skin: Negative for rash.   Neurological: Negative for dizziness, weakness, headaches and paresthesias.   Psychiatric/Behavioral: Negative for mood changes. The patient is not nervous/anxious.        OBJECTIVE:   /88   Pulse 73   Temp 97.7  F (36.5  C)   Resp 18   Ht 1.715 m (5' 7.5\")   Wt 104.6 kg (230 lb 9.6 oz)   LMP 07/01/2002   SpO2 99%   BMI 35.58 kg/m   Estimated body mass index is 35.58 kg/m  as calculated from the following:    Height as of this encounter: 1.715 m (5' 7.5\").    Weight as of this encounter: 104.6 kg (230 lb 9.6 oz).  Physical Exam  GENERAL APPEARANCE: healthy, alert and no distress  EYES: Eyes grossly normal to inspection, PERRL and conjunctivae and sclerae normal  HENT: ear canals impacted with cerumen bilateral - flushed by CMA and TM's normal, nose and mouth without ulcers or lesions, oropharynx clear and oral mucous membranes moist  NECK: no adenopathy, no asymmetry, masses, or scars and thyroid normal to palpation  RESP: lungs clear to auscultation - no rales, rhonchi or wheezes  BREAST: normal without masses, tenderness or nipple discharge and no palpable axillary " masses or adenopathy  CV: regular rate and rhythm, normal S1 S2, no S3 or S4, no murmur, click or rub, no peripheral edema and peripheral pulses strong  ABDOMEN: soft, nontender, no hepatosplenomegaly, no masses and bowel sounds normal   (female): normal female external genitalia, normal urethral meatus, vaginal mucosal atrophy noted, normal cervix, adnexae, and uterus without masses or abnormal discharge  MS: no musculoskeletal defects are noted and gait is age appropriate without ataxia  SKIN: no suspicious lesions or rashes  NEURO: Normal strength and tone, sensory exam grossly normal, mentation intact and speech normal  PSYCH: mentation appears normal and affect normal/bright    Diagnostic Test Results:  Labs reviewed in Epic  Pending     ASSESSMENT / PLAN:   (Z00.00) Encounter for Medicare annual wellness exam  (primary encounter diagnosis)  Comment: 67-year-old female in for annual Medicare wellness examination.  Chronic conditions as below.  Plan: PRIMARY CARE FOLLOW-UP SCHEDULING, Basic         metabolic panel  (Ca, Cl, CO2, Creat, Gluc, K,         Na, BUN)      (F32.0) Major depressive disorder, single episode, mild (H)  Comment: Chronic, stable on current escitalopram and Wellbutrin.  Had increased depression over the winter months, is looking forward to the summer.  Discussing getting a SAD light for next winter.  At some point, patient was wondering if he could wean down off of medications.  Discussed we will continue to work on weight loss and reevaluate.  Plan: escitalopram (LEXAPRO) 20 MG tablet, buPROPion         (WELLBUTRIN XL) 300 MG 24 hr tablet         (E66.01) Morbid obesity, unspecified obesity type (H)  Comment: Chronic, has been losing.  Did join [employer] sneakers and is working on improving diet.    (I10) Essential hypertension  Comment: Chronic, stable.  Blood pressure below target goal in office today.  Continue medications without any changes.  Recheck metabolic panel.  Plan: amLODIPine  (NORVASC) 5 MG tablet, Basic         metabolic panel  (Ca, Cl, CO2, Creat, Gluc, K,         Na, BUN)    (F41.1) Generalized anxiety disorder  Comment: Chronic, stable.  No changes.  Plan: escitalopram (LEXAPRO) 20 MG tablet, buPROPion         (WELLBUTRIN XL) 300 MG 24 hr tablet         (H61.23) Bilateral impacted cerumen  Comment: Impacted cerumen bilateral.  Flushed by CMA.  No changes.      (Z12.31) Encounter for screening mammogram for breast cancer  Comment: Due for routine mammogram screening.  Orders placed.  Patient can call [phone] to schedule  Plan: MA SCREENING DIGITAL BILAT - Future  (s+30)            Patient has been advised of split billing requirements and indicates understanding: Yes      COUNSELING:  Reviewed preventive health counseling, as reflected in patient instructions        She reports that she has never smoked. She has never used smokeless tobacco.      Appropriate preventive services were discussed with this patient, including applicable screening as appropriate for cardiovascular disease, diabetes, osteopenia/osteoporosis, and glaucoma.  As appropriate for age/gender, discussed screening for colorectal cancer, prostate cancer, breast cancer, and cervical cancer. Checklist reviewing preventive services available has been given to the patient.    Reviewed patients plan of care and provided an AVS. The Basic Care Plan (routine screening as documented in Health Maintenance) for Donita meets the Care Plan requirement. This Care Plan has been established and reviewed with the Patient.          Annika Griffin DNP, APRN-CNP   [facility]    Identified Health Risks:  I have reviewed Opioid Use Disorder and Substance Use Disorder risk factors and made any needed referrals.       Chart documentation with Dragon Voice recognition Software. Although reviewed after completion, some words and grammatical errors may remain.

## 2023-04-19 NOTE — NURSING NOTE
"Chief Complaint   Patient presents with     Wellness Visit       Initial /88   Pulse 73   Temp 97.7  F (36.5  C)   Resp 18   Ht 1.715 m (5' 7.5\")   Wt 104.6 kg (230 lb 9.6 oz)   LMP 07/01/2002   SpO2 99%   BMI 35.58 kg/m   Estimated body mass index is 35.58 kg/m  as calculated from the following:    Height as of this encounter: 1.715 m (5' 7.5\").    Weight as of this encounter: 104.6 kg (230 lb 9.6 oz).    Patient presents to the clinic using No DME    Is there anyone who you would like to be able to receive your results? not asked  If yes have patient fill out DARELL  Donita Lerner is a 67 year old  female who presents today for Ear Wash. with the complaint of fullness.    Ear exam showing wax occlusion in the both ear.    The patients ear(s) were irrigated using a elephant earwash system  With warm tapwater and hydrogen peroxide mild amount of wax extracted.  Patient tolerated procedure well.        Outcome:Bot TMs visualized, no redness  "

## 2023-04-19 NOTE — PATIENT INSTRUCTIONS
"  Patient Education   Personalized Prevention Plan  You are due for the preventive services outlined below.  Your care team is available to assist you in scheduling these services.  If you have already completed any of these items, please share that information with your care team to update in your medical record.  Health Maintenance Due   Topic Date Due     Pneumococcal Vaccine (1 - PCV) Never done     Diptheria Tetanus Pertussis (DTAP/TDAP/TD) Vaccine (2 - Td or Tdap) 10/06/2021     COVID-19 Vaccine (4 - Booster for Moderna series) 03/18/2022     Flu Vaccine (1) 09/01/2022     ANNUAL REVIEW OF HM ORDERS  02/23/2023     Annual Wellness Visit  04/29/2023     Mammogram  05/12/2023       Depression and Suicide in Older Adults  Nearly 2 million older adults in the [place] have some type of depression. Some of them even take their own lives. Yet depression among older adults is often ignored. Learning about the warning signs of depression may help spare a loved one needless pain. You may also save a life.   What is depression?  Depression is a common and serious illness. It affects the way you think and feel. It is not a normal part of aging. It is not a sign of weakness, a character flaw, or something you can \"snap out of.\" Most people with depression need treatment to get better. The most common symptom is a feeling of deep sadness. People who are depressed also may seem tired and listless. And nothing seems to give them pleasure. It s normal to grieve or be sad sometimes. But sadness lessens or passes with time. Depression rarely goes away or improves on its own. Other symptoms of depression are:     Sleeping more or less than normal    Eating more or less than normal    Having headaches, stomachaches, or other pains that don t go away    Feeling nervous,  empty,  or worthless    Crying a lot    Thinking or talking about suicide or death    Loss of interest in activities previously enjoyed    Social isolation    Feeling " confused or forgetful  What causes it?  The causes of depression aren t fully known. But it is thought to result from a complex blend of these factors:     Biochemistry. Certain chemicals in the brain play a role.    Genes. Depression does run in families.    Life stress. Life stresses can also trigger depression in some people. Older adults often face many stressors. These may include isolation, the death of friends or a spouse, health problems, and financial concerns.    Chronic health conditions. This includes diabetes, heart disease, or cancer. These can cause symptoms of depression. Medicine side effects can cause changes in thoughts and behaviors.  Giving support    Depressed people often may not want to ask for help. When they do, they may be ignored. Or they may get the wrong treatment. You can help by showing parents and older friends love and support. If they seem depressed, don t lecture the person or ignore the symptoms. Don't discount the symptoms as a  normal  part of aging. They are not. Get involved, listen, and show interest and support.   Help them understand that depression is a treatable illness. Tell them you can help them find the right treatment. Offer to go to their healthcare provider's appointment with them for support when the symptoms are discussed. With their approval, contact a local mental health center, social service agency, or hospital about services.   Helping at healthcare visits  You can be an advocate for them at healthcare appointments. Many older adults have chronic illnesses. Many of these can cause symptoms of depression. Medicine side effects can change thoughts and behaviors.   You can help make sure that the healthcare provider looks at all of these factors. They should refer your family member or friend to a mental healthcare provider when needed. In some cases, untreated depression can lead to a misdiagnosis. A person may be diagnosed with a brain disorder such as  dementia. If the healthcare provider does not take the issue of depression seriously, help your family member or friend find another provider.   Asking about self-harm thoughts  If you think an older person you care about could be suicidal, ask,  Have you thought about suicide?  Most people will tell you the truth. If they say yes, they may already have a plan for how and when they will attempt it. Find out as much as you can. The more detailed the plan, and the easier it is to carry out, the more danger the person is in right now. Tell the person you are there for them and you do not want them to get harmed. Don't wait to get help for the person. Call the person's healthcare provider, local hospital, or emergency services.   Call 988 in a crisis   Never leave the person alone. A person who is actively suicidal needs crisis care right away. They need constant supervision. Never leave the person out of sight. Call 988 Tell the crisis counselor you need help for a person who is thinking about suicide. The counselor will help you get the right level of crisis help. You may be advised to take the person to the nearest emergency room.   The National Suicide Prevention Lifeline is available at 988, or 800-273-talk (778.900.5850), or www.suicidepreventionlifeline.org. When you call or text 988, you will be connected to trained counselors who are part of the National Suicide Prevention Lifeline network. An online chat option is also available. Lifeline is free and available 24/7.   To learn more    National Suicide Prevention Lifeline at www.suicidepreventionlifeline.org  or 427-294-WFKM (074-135-2925)    National [place] [employer] [facility] Illness at www.[place].org  or 158-058-WYHT (571-514-5792)    [facility] at www.nmha.org  or 679-321-7457    National [place] [facility] at www.nimh.nih.gov  or 664-116-0739    [employer] last reviewed this educational content on 7/1/2022 2000-2022 [employer]. All  rights reserved. This information is not intended as a substitute for professional medical care. Always follow your healthcare professional's instructions.

## 2023-04-29 DIAGNOSIS — L71.0 PERIORAL DERMATITIS: ICD-10-CM

## 2023-04-30 ENCOUNTER — MYC MEDICAL ADVICE (OUTPATIENT)
Dept: FAMILY MEDICINE | Facility: CLINIC | Age: 68
End: 2023-04-30
Payer: COMMERCIAL

## 2023-04-30 DIAGNOSIS — I10 ESSENTIAL HYPERTENSION: Primary | ICD-10-CM

## 2023-04-30 RX ORDER — MUPIROCIN 20 MG/G
OINTMENT TOPICAL
Qty: 22 G | Refills: 0 | Status: SHIPPED | OUTPATIENT
Start: 2023-04-30

## 2023-05-11 DIAGNOSIS — F32.0 MAJOR DEPRESSIVE DISORDER, SINGLE EPISODE, MILD: ICD-10-CM

## 2023-05-11 DIAGNOSIS — F41.1 GENERALIZED ANXIETY DISORDER: ICD-10-CM

## 2023-05-12 DIAGNOSIS — F41.1 GENERALIZED ANXIETY DISORDER: ICD-10-CM

## 2023-05-12 DIAGNOSIS — F32.0 MAJOR DEPRESSIVE DISORDER, SINGLE EPISODE, MILD (H): ICD-10-CM

## 2023-05-12 RX ORDER — BUPROPION HYDROCHLORIDE 300 MG/1
TABLET ORAL
Qty: 90 TABLET | Refills: 1 | Status: SHIPPED | OUTPATIENT
Start: 2023-05-12 | End: 2024-04-26

## 2023-05-15 RX ORDER — ESCITALOPRAM OXALATE 20 MG/1
TABLET ORAL
Qty: 90 TABLET | Refills: 1 | Status: SHIPPED | OUTPATIENT
Start: 2023-05-15 | End: 2024-04-26

## 2023-05-17 ENCOUNTER — HOSPITAL ENCOUNTER (OUTPATIENT)
Dept: MAMMOGRAPHY | Facility: CLINIC | Age: 68
Discharge: HOME | End: 2023-05-17
Attending: NURSE PRACTITIONER | Admitting: NURSE PRACTITIONER
Payer: COMMERCIAL

## 2023-05-17 DIAGNOSIS — Z12.31 ENCOUNTER FOR SCREENING MAMMOGRAM FOR BREAST CANCER: ICD-10-CM

## 2023-05-17 PROCEDURE — 77067 SCR MAMMO BI INCL CAD: CPT

## 2023-05-27 DIAGNOSIS — I10 ESSENTIAL HYPERTENSION: ICD-10-CM

## 2023-05-30 RX ORDER — AMLODIPINE BESYLATE 5 MG/1
TABLET ORAL
Qty: 135 TABLET | Refills: 2 | Status: SHIPPED | OUTPATIENT
Start: 2023-05-30 | End: 2024-04-26

## 2024-03-18 DIAGNOSIS — I10 ESSENTIAL HYPERTENSION, BENIGN: ICD-10-CM

## 2024-03-19 RX ORDER — METOPROLOL SUCCINATE 25 MG/1
25 TABLET, EXTENDED RELEASE ORAL DAILY
Qty: 90 TABLET | Refills: 0 | Status: SHIPPED | OUTPATIENT
Start: 2024-03-19 | End: 2024-04-26

## 2024-03-20 ENCOUNTER — PATIENT OUTREACH (OUTPATIENT)
Dept: CARE COORDINATION | Facility: CLINIC | Age: 69
End: 2024-03-20
Payer: COMMERCIAL

## 2024-04-03 ENCOUNTER — PATIENT OUTREACH (OUTPATIENT)
Dept: CARE COORDINATION | Facility: CLINIC | Age: 69
End: 2024-04-03
Payer: COMMERCIAL

## 2024-04-19 SDOH — HEALTH STABILITY: PHYSICAL HEALTH: ON AVERAGE, HOW MANY MINUTES DO YOU ENGAGE IN EXERCISE AT THIS LEVEL?: 30 MIN

## 2024-04-19 SDOH — HEALTH STABILITY: PHYSICAL HEALTH: ON AVERAGE, HOW MANY DAYS PER WEEK DO YOU ENGAGE IN MODERATE TO STRENUOUS EXERCISE (LIKE A BRISK WALK)?: 2 DAYS

## 2024-04-19 ASSESSMENT — SOCIAL DETERMINANTS OF HEALTH (SDOH): HOW OFTEN DO YOU GET TOGETHER WITH FRIENDS OR RELATIVES?: TWICE A WEEK

## 2024-04-26 ENCOUNTER — OFFICE VISIT (OUTPATIENT)
Dept: FAMILY MEDICINE | Facility: CLINIC | Age: 69
End: 2024-04-26
Attending: NURSE PRACTITIONER
Payer: COMMERCIAL

## 2024-04-26 VITALS
HEART RATE: 83 BPM | HEIGHT: 67 IN | SYSTOLIC BLOOD PRESSURE: 132 MMHG | DIASTOLIC BLOOD PRESSURE: 80 MMHG | OXYGEN SATURATION: 95 % | WEIGHT: 239.4 LBS | RESPIRATION RATE: 22 BRPM | BODY MASS INDEX: 37.57 KG/M2 | TEMPERATURE: 97.5 F

## 2024-04-26 DIAGNOSIS — F32.0 MAJOR DEPRESSIVE DISORDER, SINGLE EPISODE, MILD (H): ICD-10-CM

## 2024-04-26 DIAGNOSIS — I10 ESSENTIAL HYPERTENSION, BENIGN: ICD-10-CM

## 2024-04-26 DIAGNOSIS — E66.01 MORBID OBESITY, UNSPECIFIED OBESITY TYPE (H): ICD-10-CM

## 2024-04-26 DIAGNOSIS — Z13.6 CARDIOVASCULAR SCREENING; LDL GOAL LESS THAN 130: ICD-10-CM

## 2024-04-26 DIAGNOSIS — F41.1 GENERALIZED ANXIETY DISORDER: ICD-10-CM

## 2024-04-26 DIAGNOSIS — K59.00 CONSTIPATION, UNSPECIFIED CONSTIPATION TYPE: ICD-10-CM

## 2024-04-26 DIAGNOSIS — Z00.00 ENCOUNTER FOR MEDICARE ANNUAL WELLNESS EXAM: Primary | ICD-10-CM

## 2024-04-26 DIAGNOSIS — M81.0 OSTEOPOROSIS WITHOUT CURRENT PATHOLOGICAL FRACTURE, UNSPECIFIED OSTEOPOROSIS TYPE: ICD-10-CM

## 2024-04-26 DIAGNOSIS — R41.3 MEMORY CHANGES: ICD-10-CM

## 2024-04-26 LAB
ALBUMIN SERPL BCG-MCNC: 4.2 G/DL (ref 3.5–5.2)
ALP SERPL-CCNC: 117 U/L (ref 40–150)
ALT SERPL W P-5'-P-CCNC: 9 U/L (ref 0–50)
ANION GAP SERPL CALCULATED.3IONS-SCNC: 9 MMOL/L (ref 7–15)
AST SERPL W P-5'-P-CCNC: 19 U/L (ref 0–45)
BILIRUB SERPL-MCNC: 1 MG/DL
BUN SERPL-MCNC: 15.8 MG/DL (ref 8–23)
CALCIUM SERPL-MCNC: 9.5 MG/DL (ref 8.8–10.2)
CHLORIDE SERPL-SCNC: 103 MMOL/L (ref 98–107)
CHOLEST SERPL-MCNC: 187 MG/DL
CREAT SERPL-MCNC: 0.98 MG/DL (ref 0.51–0.95)
DEPRECATED HCO3 PLAS-SCNC: 28 MMOL/L (ref 22–29)
EGFRCR SERPLBLD CKD-EPI 2021: 63 ML/MIN/1.73M2
ERYTHROCYTE [DISTWIDTH] IN BLOOD BY AUTOMATED COUNT: 13.6 % (ref 10–15)
FASTING STATUS PATIENT QL REPORTED: ABNORMAL
GLUCOSE SERPL-MCNC: 98 MG/DL (ref 70–99)
HCT VFR BLD AUTO: 48.2 % (ref 35–47)
HDLC SERPL-MCNC: 70 MG/DL
HGB BLD-MCNC: 15.6 G/DL (ref 11.7–15.7)
LDLC SERPL CALC-MCNC: 101 MG/DL
MCH RBC QN AUTO: 29.7 PG (ref 26.5–33)
MCHC RBC AUTO-ENTMCNC: 32.4 G/DL (ref 31.5–36.5)
MCV RBC AUTO: 92 FL (ref 78–100)
NONHDLC SERPL-MCNC: 117 MG/DL
PLATELET # BLD AUTO: 214 10E3/UL (ref 150–450)
POTASSIUM SERPL-SCNC: 4.8 MMOL/L (ref 3.4–5.3)
PROT SERPL-MCNC: 7 G/DL (ref 6.4–8.3)
RBC # BLD AUTO: 5.25 10E6/UL (ref 3.8–5.2)
SODIUM SERPL-SCNC: 140 MMOL/L (ref 135–145)
TRIGL SERPL-MCNC: 79 MG/DL
TSH SERPL DL<=0.005 MIU/L-ACNC: 0.89 UIU/ML (ref 0.3–4.2)
WBC # BLD AUTO: 7 10E3/UL (ref 4–11)

## 2024-04-26 PROCEDURE — 84443 ASSAY THYROID STIM HORMONE: CPT | Performed by: NURSE PRACTITIONER

## 2024-04-26 PROCEDURE — 99214 OFFICE O/P EST MOD 30 MIN: CPT | Mod: 25 | Performed by: NURSE PRACTITIONER

## 2024-04-26 PROCEDURE — 82607 VITAMIN B-12: CPT | Performed by: NURSE PRACTITIONER

## 2024-04-26 PROCEDURE — 36415 COLL VENOUS BLD VENIPUNCTURE: CPT | Performed by: NURSE PRACTITIONER

## 2024-04-26 PROCEDURE — 80053 COMPREHEN METABOLIC PANEL: CPT | Performed by: NURSE PRACTITIONER

## 2024-04-26 PROCEDURE — G0439 PPPS, SUBSEQ VISIT: HCPCS | Performed by: NURSE PRACTITIONER

## 2024-04-26 PROCEDURE — 85027 COMPLETE CBC AUTOMATED: CPT | Performed by: NURSE PRACTITIONER

## 2024-04-26 PROCEDURE — 80061 LIPID PANEL: CPT | Performed by: NURSE PRACTITIONER

## 2024-04-26 RX ORDER — AMLODIPINE BESYLATE 5 MG/1
5 TABLET ORAL DAILY
Qty: 90 TABLET | Refills: 3 | Status: SHIPPED | OUTPATIENT
Start: 2024-04-26 | End: 2024-07-03

## 2024-04-26 RX ORDER — ALENDRONATE SODIUM 70 MG/1
70 TABLET ORAL
Qty: 12 TABLET | Refills: 3 | Status: SHIPPED | OUTPATIENT
Start: 2024-04-26 | End: 2024-07-03

## 2024-04-26 RX ORDER — ESCITALOPRAM OXALATE 20 MG/1
20 TABLET ORAL DAILY
Qty: 90 TABLET | Refills: 1 | Status: SHIPPED | OUTPATIENT
Start: 2024-04-26 | End: 2024-07-03

## 2024-04-26 RX ORDER — AMLODIPINE BESYLATE 5 MG/1
5 TABLET ORAL DAILY
Qty: 90 TABLET | Refills: 3 | Status: SHIPPED | OUTPATIENT
Start: 2024-04-26 | End: 2024-04-26

## 2024-04-26 RX ORDER — METOPROLOL SUCCINATE 25 MG/1
25 TABLET, EXTENDED RELEASE ORAL DAILY
Qty: 90 TABLET | Refills: 0 | Status: SHIPPED | OUTPATIENT
Start: 2024-04-26 | End: 2024-07-03

## 2024-04-26 RX ORDER — RESPIRATORY SYNCYTIAL VIRUS VACCINE 120MCG/0.5
0.5 KIT INTRAMUSCULAR ONCE
Qty: 1 EACH | Refills: 0 | Status: CANCELLED | OUTPATIENT
Start: 2024-04-26 | End: 2024-04-26

## 2024-04-26 RX ORDER — BUPROPION HYDROCHLORIDE 150 MG/1
150 TABLET ORAL EVERY MORNING
Qty: 90 TABLET | Refills: 0 | Status: SHIPPED | OUTPATIENT
Start: 2024-04-26 | End: 2024-07-01

## 2024-04-26 ASSESSMENT — ANXIETY QUESTIONNAIRES
1. FEELING NERVOUS, ANXIOUS, OR ON EDGE: NEARLY EVERY DAY
7. FEELING AFRAID AS IF SOMETHING AWFUL MIGHT HAPPEN: NOT AT ALL
2. NOT BEING ABLE TO STOP OR CONTROL WORRYING: SEVERAL DAYS
3. WORRYING TOO MUCH ABOUT DIFFERENT THINGS: MORE THAN HALF THE DAYS
7. FEELING AFRAID AS IF SOMETHING AWFUL MIGHT HAPPEN: NOT AT ALL
GAD7 TOTAL SCORE: 9
5. BEING SO RESTLESS THAT IT IS HARD TO SIT STILL: NOT AT ALL
GAD7 TOTAL SCORE: 9
6. BECOMING EASILY ANNOYED OR IRRITABLE: SEVERAL DAYS
8. IF YOU CHECKED OFF ANY PROBLEMS, HOW DIFFICULT HAVE THESE MADE IT FOR YOU TO DO YOUR WORK, TAKE CARE OF THINGS AT HOME, OR GET ALONG WITH OTHER PEOPLE?: SOMEWHAT DIFFICULT
4. TROUBLE RELAXING: MORE THAN HALF THE DAYS
GAD7 TOTAL SCORE: 9
IF YOU CHECKED OFF ANY PROBLEMS ON THIS QUESTIONNAIRE, HOW DIFFICULT HAVE THESE PROBLEMS MADE IT FOR YOU TO DO YOUR WORK, TAKE CARE OF THINGS AT HOME, OR GET ALONG WITH OTHER PEOPLE: SOMEWHAT DIFFICULT

## 2024-04-26 ASSESSMENT — PATIENT HEALTH QUESTIONNAIRE - PHQ9
SUM OF ALL RESPONSES TO PHQ QUESTIONS 1-9: 11
SUM OF ALL RESPONSES TO PHQ QUESTIONS 1-9: 11

## 2024-04-26 ASSESSMENT — PAIN SCALES - GENERAL: PAINLEVEL: NO PAIN (0)

## 2024-04-26 NOTE — PATIENT INSTRUCTIONS
Major depressive disorder, single episode, mild (H24)  Generalized anxiety disorder  Chronic, improved.  However, feels moods are more suppressed, unable to cry.  Wondering about decreasing medication.  I discussed trial of decreasing Wellbutrin from 300 mg to 150 mg.  Keeping escitalopram at the current dose.  Patient to notify provider if needing to go back up.    - buPROPion (WELLBUTRIN XL) 150 MG 24 hr tablet; Take 1 tablet (150 mg) by mouth every morning  - escitalopram (LEXAPRO) 20 MG tablet; Take 1 tablet (20 mg) by mouth daily    Morbid obesity, unspecified obesity type (H)  Chronic, discussed diet and exercise at length.  Encourage patient to increase daily physical activity and follow a healthy diet.    Osteoporosis without current pathological fracture, unspecified osteoporosis type  Most recent DXA scan in 2021, shows osteoporosis of lumbar spine.  Was advised to start calcium and vitamin D, however patient has not been taking calcium due to history of kidney stones.  Only taking vitamin D.  Discussed, patient should be on treatment for osteoporosis, will start Fosamax once weekly.  Reviewed proper administration instructions with patient.  Also recommend repeat DXA scan, orders placed.  Patient can call [phone] to schedule.  Endocrinology referral also placed for patient to follow-up.  - DEXA HIP/PELVIS/SPINE - Future; Future  - Adult Endocrinology  Referral; Future  - alendronate (FOSAMAX) 70 MG tablet; Take 1 tablet (70 mg) by mouth every 7 days    Essential hypertension, benign  Chronic, stable.  Blood pressure below target goal in office today.  Patient having difficulty splitting amlodipine in half for 7.5 mg dosage.  Discussed decreasing to 5 mg and monitoring blood pressures.  Patient okay to monitor at home and update provider via [employer] message or can schedule a nurse only blood pressure check in 1 to 2 weeks.  If blood pressures creeping back up, will discuss other options.  -  amLODIPine (NORVASC) 5 MG tablet; Take 1 tablet (5 mg) by mouth daily   - metoprolol succinate ER (TOPROL XL) 25 MG 24 hr tablet; Take 1 tablet (25 mg) by mouth daily  - Comprehensive metabolic panel (BMP + Alb, Alk Phos, ALT, AST, Total. Bili, TP); Future    Constipation, unspecified constipation type  Intermittent, ongoing constipation.  Uses MiraLAX as needed.  Discussed increasing water intake and activity.  As well as fiber in diet.  Okay to use MiraLAX regularly as needed and if significantly constipated can use over-the-counter magnesium citrate.    Memory changes  Patient noticing increased memory changes, being forgetful and not as sharp as normal.  Will obtain lab work to rule out any underlying cause.  - CBC with platelets; Future  - TSH with free T4 reflex; Future  - Vitamin B12; Future  - Comprehensive metabolic panel (BMP + Alb, Alk Phos, ALT, AST, Total. Bili, TP); Future    CARDIOVASCULAR SCREENING; LDL GOAL LESS THAN 130  Patient is fasting today, last cholesterol levels 5 years ago.  Will recheck.  - Lipid panel reflex to direct LDL Non-fasting; Future      Preventive Care Advice   This is general advice given by our system to help you stay healthy. However, your care team may have specific advice just for you. Please talk to your care team about your preventive care needs.  Nutrition  Eat 5 or more servings of fruits and vegetables each day.  Try wheat bread, brown rice and whole grain pasta (instead of white bread, rice, and pasta).  Get enough calcium and vitamin D. Check the label on foods and aim for 100% of the RDA (recommended daily allowance).  Lifestyle  Exercise at least 150 minutes each week   (30 minutes a day, 5 days a week).  Do muscle strengthening activities 2 days a week. These help control your weight and prevent disease.  No smoking.  Wear sunscreen to prevent skin cancer.  Have a dental exam and cleaning every 6 months.  Yearly exams  See your health care team every year to talk  about:  Any changes in your health.  Any medicines your care team has prescribed.  Preventive care, family planning, and ways to prevent chronic diseases.  Shots (vaccines)   HPV shots (up to age 26), if you've never had them before.  Hepatitis B shots (up to age 59), if you've never had them before.  COVID-19 shot: Get this shot when it's due.  Flu shot: Get a flu shot every year.  Tetanus shot: Get a tetanus shot every 10 years.  Pneumococcal, hepatitis A, and RSV shots: Ask your care team if you need these based on your risk.  Shingles shot (for age 50 and up).  General health tests  Diabetes screening:  Starting at age 35, Get screened for diabetes at least every 3 years.  If you are younger than age 35, ask your care team if you should be screened for diabetes.  Cholesterol test: At age 39, start having a cholesterol test every 5 years, or more often if advised.  Bone density scan (DEXA): At age 50, ask your care team if you should have this scan for osteoporosis (brittle bones).  Hepatitis C: Get tested at least once in your life.  STIs (sexually transmitted infections)  Before age 24: Ask your care team if you should be screened for STIs.  After age 24: Get screened for STIs if you're at risk. You are at risk for STIs (including HIV) if:  You are sexually active with more than one person.  You don't use condoms every time.  You or a partner was diagnosed with a sexually transmitted infection.  If you are at risk for HIV, ask about PrEP medicine to prevent HIV.  Get tested for HIV at least once in your life, whether you are at risk for HIV or not.  Cancer screening tests  Cervical cancer screening: If you have a cervix, begin getting regular cervical cancer screening tests at age 21. Most people who have regular screenings with normal results can stop after age 65. Talk about this with your provider.  Breast cancer scan (mammogram): If you've ever had breasts, begin having regular mammograms starting at age  40. This is a scan to check for breast cancer.  Colon cancer screening: It is important to start screening for colon cancer at age 45.  Have a colonoscopy test every 10 years (or more often if you're at risk) Or, ask your provider about stool tests like a FIT test every year or Cologuard test every 3 years.  To learn more about your testing options, visit: [email or web address]/[email or web address].  For help making a decision, visit: https://bit.ly/he82428.  Prostate cancer screening test: If you have a prostate and are age 55 to 69, ask your provider if you would benefit from a yearly prostate cancer screening test.  Lung cancer screening: If you are a current or former smoker age 50 to 80, ask your care team if ongoing lung cancer screenings are right for you.  For informational purposes only. Not to replace the advice of your health care provider. Copyright   2023 [employer]. All rights reserved. Clinically reviewed by the [facility]. [employer] 647052 - REV 01/24.    Preventing Falls: Care Instructions  Injuries and health problems such as trouble walking or poor eyesight can increase your risk of falling. So can some medicines. But there are things you can do to help prevent falls. You can exercise to get stronger. You can also arrange your home to make it safer.    Talk to your doctor about the medicines you take. Ask if any of them increase the risk of falls and whether they can be changed or stopped.   Try to exercise regularly. It can help improve your strength and balance. This can help lower your risk of falling.     Practice fall safety and prevention.    Wear low-heeled shoes that fit well and give your feet good support. Talk to your doctor if you have foot problems that make this hard.  Carry a cellphone or wear a medical alert device that you can use to call for help.  Use stepladders instead of chairs to reach high objects. Don't climb if you're at risk for falls.  "Ask for help, if needed.  Wear the correct eyeglasses, if you need them.    Make your home safer.    Remove rugs, cords, clutter, and furniture from walkways.  Keep your house well lit. Use night-lights in hallways and bathrooms.  Install and use sturdy handrails on stairways.  Wear nonskid footwear, even inside. Don't walk barefoot or in socks without shoes.    Be safe outside.    Use handrails, curb cuts, and ramps whenever possible.  Keep your hands free by using a shoulder bag or backpack.  Try to walk in well-lit areas. Watch out for uneven ground, changes in pavement, and debris.  Be careful in the winter. Walk on the grass or gravel when sidewalks are slippery. Use de-icer on steps and walkways. Add non-slip devices to shoes.    Put grab bars and nonskid mats in your shower or tub and near the toilet. Try to use a shower chair or bath bench when bathing.   Get into a tub or shower by putting in your weaker leg first. Get out with your strong side first. Have a phone or medical alert device in the bathroom with you.   Where can you learn more?  Go to https://www.[employer].net/patiented  Enter G117 in the search box to learn more about \"Preventing Falls: Care Instructions.\"  Current as of: July 17, 2023               Content Version: 14.0    0464-0233 [email or web address].   Care instructions adapted under license by your healthcare professional. If you have questions about a medical condition or this instruction, always ask your healthcare professional. [email or web address] disclaims any warranty or liability for your use of this information.      Learning About Stress  What is stress?     Stress is your body's response to a hard situation. Your body can have a physical, emotional, or mental response. Stress is a fact of life for most people, and it affects everyone differently. What causes stress for you may not be stressful for someone else.  A lot of things can cause stress. You may feel stress " when you go on a job interview, take a test, or run a race. This kind of short-term stress is normal and even useful. It can help you if you need to work hard or react quickly. For example, stress can help you finish an important job on time.  Long-term stress is caused by ongoing stressful situations or events. Examples of long-term stress include long-term health problems, ongoing problems at work, or conflicts in your family. Long-term stress can harm your health.  How does stress affect your health?  When you are stressed, your body responds as though you are in danger. It makes hormones that speed up your heart, make you breathe faster, and give you a burst of energy. This is called the fight-or-flight stress response. If the stress is over quickly, your body goes back to normal and no harm is done.  But if stress happens too often or lasts too long, it can have bad effects. Long-term stress can make you more likely to get sick, and it can make symptoms of some diseases worse. If you tense up when you are stressed, you may develop neck, shoulder, or low back pain. Stress is linked to high blood pressure and heart disease.  Stress also harms your emotional health. It can make you marquez, tense, or depressed. Your relationships may suffer, and you may not do well at work or school.  What can you do to manage stress?  You can try these things to help manage stress:   Do something active. Exercise or activity can help reduce stress. Walking is a great way to get started. Even everyday activities such as housecleaning or yard work can help.  Try yoga or sharona chi. These techniques combine exercise and meditation. You may need some training at first to learn them.  Do something you enjoy. For example, listen to music or go to a movie. Practice your hobby or do volunteer work.  Meditate. This can help you relax, because you are not worrying about what happened before or what may happen in the future.  Do guided imagery.  "Imagine yourself in any setting that helps you feel calm. You can use online videos, books, or a teacher to guide you.  Do breathing exercises. For example:  From a standing position, bend forward from the waist with your knees slightly bent. Let your arms dangle close to the floor.  Breathe in slowly and deeply as you return to a standing position. Roll up slowly and lift your head last.  Hold your breath for just a few seconds in the standing position.  Breathe out slowly and bend forward from the waist.  Let your feelings out. Talk, laugh, cry, and express anger when you need to. Talking with supportive friends or family, a counselor, or a prabhjot leader about your feelings is a healthy way to relieve stress. Avoid discussing your feelings with people who make you feel worse.  Write. It may help to write about things that are bothering you. This helps you find out how much stress you feel and what is causing it. When you know this, you can find better ways to cope.  What can you do to prevent stress?  You might try some of these things to help prevent stress:  Manage your time. This helps you find time to do the things you want and need to do.  Get enough sleep. Your body recovers from the stresses of the day while you are sleeping.  Get support. Your family, friends, and community can make a difference in how you experience stress.  Limit your news feed. Avoid or limit time on social media or news that may make you feel stressed.  Do something active. Exercise or activity can help reduce stress. Walking is a great way to get started.  Where can you learn more?  Go to https://www.[employer].net/patiented  Enter N032 in the search box to learn more about \"Learning About Stress.\"  Current as of: October 24, 2023               Content Version: 14.0    2431-8761 [employer].   Care instructions adapted under license by your healthcare professional. If you have questions about a medical condition or this " instruction, always ask your healthcare professional. Healthwise, [facility] disclaims any warranty or liability for your use of this information.      Learning About Sleeping Well  What does sleeping well mean?     Sleeping well means getting enough sleep to feel good and stay healthy. How much sleep is enough varies among people.  The number of hours you sleep and how you feel when you wake up are both important. If you do not feel refreshed, you probably need more sleep. Another sign of not getting enough sleep is feeling tired during the day.  Experts recommend that adults get at least 7 or more hours of sleep per day. Children and older adults need more sleep.  Why is getting enough sleep important?  Getting enough quality sleep is a basic part of good health. When your sleep suffers, your physical health, mood, and your thoughts can suffer too. You may find yourself feeling more grumpy or stressed. Not getting enough sleep also can lead to serious problems, including injury, accidents, anxiety, and depression.  What might cause poor sleeping?  Many things can cause sleep problems, including:  Changes to your sleep schedule.  Stress. Stress can be caused by fear about a single event, such as giving a speech. Or you may have ongoing stress, such as worry about work or school.  Depression, anxiety, and other mental or emotional conditions.  Changes in your sleep habits or surroundings. This includes changes that happen where you sleep, such as noise, light, or sleeping in a different bed. It also includes changes in your sleep pattern, such as having jet lag or working a late shift.  Health problems, such as pain, breathing problems, and restless legs syndrome.  Lack of regular exercise.  Using alcohol, nicotine, or caffeine before bed.  How can you help yourself?  Here are some tips that may help you sleep more soundly and wake up feeling more refreshed.  Your sleeping area   Use your bedroom only for sleeping  "and sex. A bit of light reading may help you fall asleep. But if it doesn't, do your reading elsewhere in the house. Try not to use your TV, computer, smartphone, or tablet while you are in bed.  Be sure your bed is big enough to stretch out comfortably, especially if you have a sleep partner.  Keep your bedroom quiet, dark, and cool. Use curtains, blinds, or a sleep mask to block out light. To block out noise, use earplugs, soothing music, or a \"white noise\" machine.  Your evening and bedtime routine   Create a relaxing bedtime routine. You might want to take a warm shower or bath, or listen to soothing music.  Go to bed at the same time every night. And get up at the same time every morning, even if you feel tired.  What to avoid   Limit caffeine (coffee, tea, caffeinated sodas) during the day, and don't have any for at least 6 hours before bedtime.  Avoid drinking alcohol before bedtime. Alcohol can cause you to wake up more often during the night.  Try not to smoke or use tobacco, especially in the evening. Nicotine can keep you awake.  Limit naps during the day, especially close to bedtime.  Avoid lying in bed awake for too long. If you can't fall asleep or if you wake up in the middle of the night and can't get back to sleep within about 20 minutes, get out of bed and go to another room until you feel sleepy.  Avoid taking medicine right before bed that may keep you awake or make you feel hyper or energized. Your doctor can tell you if your medicine may do this and if you can take it earlier in the day.  If you can't sleep   Imagine yourself in a peaceful, pleasant scene. Focus on the details and feelings of being in a place that is relaxing.  Get up and do a quiet or boring activity until you feel sleepy.  Avoid drinking any liquids before going to bed to help prevent waking up often to use the bathroom.  Where can you learn more?  Go to https://www.healthwise.net/patiented  Enter J942 in the search box to " "learn more about \"Learning About Sleeping Well.\"  Current as of: July 10, 2023               Content Version: 14.0    2872-9998 [employer].   Care instructions adapted under license by your healthcare professional. If you have questions about a medical condition or this instruction, always ask your healthcare professional. [employer] disclaims any warranty or liability for your use of this information.      Learning About Depression Screening  What is depression screening?  Depression screening is a way to see if you have depression symptoms. It may be done by a doctor or counselor. It's often part of a routine checkup. That's because your mental health is just as important as your physical health.  Depression is a mental health condition that affects how you feel, think, and act. You may:  Have less energy.  Lose interest in your daily activities.  Feel sad and grouchy for a long time.  Depression is very common. It affects people of all ages.  Many things can lead to depression. Some people become depressed after they have a stroke or find out they have a major illness like cancer or heart disease. The death of a loved one or a breakup may lead to depression. It can run in families. Most experts believe that a combination of inherited genes and stressful life events can cause it.  What happens during screening?  You may be asked to fill out a form about your depression symptoms. You and the doctor will discuss your answers. The doctor may ask you more questions to learn more about how you think, act, and feel.  What happens after screening?  If you have symptoms of depression, your doctor will talk to you about your options.  Doctors usually treat depression with medicines or counseling. Often, combining the two works best. Many people don't get help because they think that they'll get over the depression on their own. But people with depression may not get better unless they get " "treatment.  The cause of depression is not well understood. There may be many factors involved. But if you have depression, it's not your fault.  A serious symptom of depression is thinking about death or suicide. If you or someone you care about talks about this or about feeling hopeless, get help right away.  It's important to know that depression can be treated. Medicine, counseling, and self-care may help.  Where can you learn more?  Go to https://www.[employer].net/patiented  Enter T185 in the search box to learn more about \"Learning About Depression Screening.\"  Current as of: June 24, 2023               Content Version: 14.0    1566-2887 [employer].   Care instructions adapted under license by your healthcare professional. If you have questions about a medical condition or this instruction, always ask your healthcare professional. [employer] disclaims any warranty or liability for your use of this information.      "

## 2024-04-26 NOTE — PROGRESS NOTES
Preventive Care Visit  [facility]  Annika MONACO, Pallavi DNP,, APRN CNP, Family Medicine  Apr 26, 2024      Assessment & Plan     Encounter for Medicare annual wellness exam  68-year-old pleasant female in for annual Medicare wellness examination.  Chronic conditions as below.  - PRIMARY CARE FOLLOW-UP SCHEDULING  - Comprehensive metabolic panel (BMP + Alb, Alk Phos, ALT, AST, Total. Bili, TP); Future    Major depressive disorder, single episode, mild (H24)  Generalized anxiety disorder  Chronic, improved.  However, feels moods are more suppressed, unable to cry.  Wondering about decreasing medication.  I discussed trial of decreasing Wellbutrin from 300 mg to 150 mg.  Keeping escitalopram at the current dose.  Patient to notify provider if needing to go back up.    - buPROPion (WELLBUTRIN XL) 150 MG 24 hr tablet; Take 1 tablet (150 mg) by mouth every morning  - escitalopram (LEXAPRO) 20 MG tablet; Take 1 tablet (20 mg) by mouth daily    Morbid obesity, unspecified obesity type (H)  Chronic, discussed diet and exercise at length.  Encourage patient to increase daily physical activity and follow a healthy diet.    Osteoporosis without current pathological fracture, unspecified osteoporosis type  Most recent DXA scan in 2021, shows osteoporosis of lumbar spine.  Was advised to start calcium and vitamin D, however patient has not been taking calcium due to history of kidney stones.  Only taking vitamin D.  Discussed, patient should be on treatment for osteoporosis, will start Fosamax once weekly.  Reviewed proper administration instructions with patient.  Also recommend repeat DXA scan, orders placed.  Patient can call [phone] to schedule.  Endocrinology referral also placed for patient to follow-up.  - DEXA HIP/PELVIS/SPINE - Future; Future  - Adult Endocrinology  Referral; Future  - alendronate (FOSAMAX) 70 MG tablet; Take 1 tablet (70 mg) by mouth every 7 days    Essential  "hypertension, benign  Chronic, stable.  Blood pressure below target goal in office today.  Patient having difficulty splitting amlodipine in half for 7.5 mg dosage.  Discussed decreasing to 5 mg and monitoring blood pressures.  Patient okay to monitor at home and update provider via [employer] message or can schedule a nurse only blood pressure check in 1 to 2 weeks.  If blood pressures creeping back up, will discuss other options.  - amLODIPine (NORVASC) 5 MG tablet; Take 1 tablet (5 mg) by mouth daily   - metoprolol succinate ER (TOPROL XL) 25 MG 24 hr tablet; Take 1 tablet (25 mg) by mouth daily  - Comprehensive metabolic panel (BMP + Alb, Alk Phos, ALT, AST, Total. Bili, TP); Future    Constipation, unspecified constipation type  Intermittent, ongoing constipation.  Uses MiraLAX as needed.  Discussed increasing water intake and activity.  As well as fiber in diet.  Okay to use MiraLAX regularly as needed and if significantly constipated can use over-the-counter magnesium citrate.    Memory changes  Patient noticing increased memory changes, being forgetful and not as sharp as normal.  Will obtain lab work to rule out any underlying cause.  - CBC with platelets; Future  - TSH with free T4 reflex; Future  - Vitamin B12; Future  - Comprehensive metabolic panel (BMP + Alb, Alk Phos, ALT, AST, Total. Bili, TP); Future    CARDIOVASCULAR SCREENING; LDL GOAL LESS THAN 130  Patient is fasting today, last cholesterol levels 5 years ago.  Will recheck.  - Lipid panel reflex to direct LDL Non-fasting; Future    Patient has been advised of split billing requirements and indicates understanding: Yes        BMI  Estimated body mass index is 37.5 kg/m  as calculated from the following:    Height as of this encounter: 1.702 m (5' 7\").    Weight as of this encounter: 108.6 kg (239 lb 6.4 oz).   Weight management plan: Discussed healthy diet and exercise guidelines    Counseling  Appropriate preventive services were discussed with " this patient, including applicable screening as appropriate for fall prevention, nutrition, physical activity, Tobacco-use cessation, weight loss and cognition.  Checklist reviewing preventive services available has been given to the patient.  Reviewed patient's diet, addressing concerns and/or questions.   She is at risk for lack of exercise and has been provided with information to increase physical activity for the benefit of her well-being.   Discussed possible causes of fatigue. The patient's PHQ-9 score is consistent with moderate depression. She was provided with information regarding depression.       See Patient Instructions    Subjective   Danika is a 68 year old, presenting for the following:  Physical        4/26/2024    10:48 AM   Additional Questions   Roomed by Liz ALMANZAR CMA     Health Care Directive  Patient does not have a Health Care Directive or Living Will: Discussed advance care planning with patient; information given to patient to review.    HPI  Patient has been noticing that she has been more forgetful lately, and thinks her memory is worsening.  Wondering if there is a medication or supplement she can take to help with her memory.     Wants to decrease some meds   Can't cry tears anymore      Answers submitted by the patient for this visit:  Patient Health Questionnaire (Submitted on 4/26/2024)  PHQ9 TOTAL SCORE: 11  NATACHA-7 (Submitted on 4/26/2024)  NATACHA 7 TOTAL SCORE: 9            4/19/2024   General Health   How would you rate your overall physical health? Good   Feel stress (tense, anxious, or unable to sleep) Rather much   (!) STRESS CONCERN      4/19/2024   Nutrition   Diet: Regular (no restrictions)         4/19/2024   Exercise   Days per week of moderate/strenous exercise 2 days   Average minutes spent exercising at this level 30 min   (!) EXERCISE CONCERN      4/19/2024   Social Factors   Frequency of gathering with friends or relatives Twice a week   Worry food won't last until get  money to buy more No   Food not last or not have enough money for food? No   Do you have housing?  Yes   Are you worried about losing your housing? No   Lack of transportation? No   Unable to get utilities (heat,electricity)? No         4/26/2024   Fall Risk   Fallen 2 or more times in the past year? Yes   Trouble with walking or balance? Yes   Gait Speed Test (Document in seconds) 3.56   Gait Speed Test Interpretation Less than or equal to 5.00 seconds - PASS          4/19/2024   Activities of Daily Living- Home Safety   Needs help with the following daily activites None of the above   Safety concerns in the home None of the above         4/19/2024   Dental   Dentist two times every year? Yes         4/19/2024   Hearing Screening   Hearing concerns? None of the above         4/19/2024   Driving Risk Screening   Patient/family members have concerns about driving No         4/19/2024   General Alertness/Fatigue Screening   Have you been more tired than usual lately? (!) YES         4/19/2024   Urinary Incontinence Screening   Bothered by leaking urine in past 6 months No         4/19/2024   TB Screening   Were you born outside of the [place]? No       Today's PHQ-9 Score:       4/26/2024    10:28 AM   PHQ-9 SCORE   PHQ-9 Total Score MyChart 11 (Moderate depression)   PHQ-9 Total Score 11         4/19/2024   Substance Use   Alcohol more than 3/day or more than 7/wk No   Do you have a current opioid prescription? No   How severe/bad is pain from 1 to 10? 1/10   Do you use any other substances recreationally? No     Social History     Tobacco Use    Smoking status: Never    Smokeless tobacco: Never   Vaping Use    Vaping status: Never Used   Substance Use Topics    Alcohol use: No    Drug use: No           5/17/2023   LAST FHS-7 RESULTS   1st degree relative breast or ovarian cancer No   Any relative bilateral breast cancer No   Any male have breast cancer No   Any ONE woman have BOTH breast AND ovarian cancer No   Any woman  with breast cancer before 50yrs No   2 or more relatives with breast AND/OR ovarian cancer No   2 or more relatives with breast AND/OR bowel cancer No        Mammogram Screening - Mammogram every 1-2 years updated in Health Maintenance based on mutual decision making    ASCVD Risk   The ASCVD Risk score (Bert ROGEL, et al., 2019) failed to calculate for the following reasons:    Cannot find a previous HDL lab    Cannot find a previous total cholesterol lab            Reviewed and updated as needed this visit by Provider                    Past Medical History:   Diagnosis Date    Arthritis     Cerebral infarction (H)     Depressive disorder     Depressive disorder, not elsewhere classified     Generalized anxiety disorder     Hypertension     No cardiologist    Meniere's disease 2005     Problem list name updated by automated process. Provider to review    Meniere's disease, unspecified     Sensorineural hearing loss, unspecified     left ear since birth complete    Unspecified episodic mood disorder     depression worsens winter SADD     Past Surgical History:   Procedure Laterality Date    ADENOIDECTOMY  1967    ARTHROPLASTY KNEE  2013    Procedure: ARTHROPLASTY KNEE;  Right Total Knee Arthroplasty  ;  Surgeon: Ganesh Sheikh MD;  Location: [facility]    ARTHROPLASTY KNEE UNICOMPARTMENT Left 2016    Procedure: ARTHROPLASTY KNEE UNICOMPARTMENT;  Surgeon: Ganesh Sheikh MD;  Location: [facility] OR    BIOPSY      Colonoscopy    C ANESTH, SECTION      x3    COLONOSCOPY N/A 2023    Procedure: COLONOSCOPY, WITH POLYPECTOMY AND BIOPSY;  Surgeon: Kraig Vides DO;  Location: [facility]    ESOPHAGOSCOPY, GASTROSCOPY, DUODENOSCOPY (EGD), COMBINED N/A 2023    Procedure: ESOPHAGOGASTRODUODENOSCOPY, WITH BIOPSY;  Surgeon: Kraig Vides DO;  Location: [facility]    IR LYMPH NODE BIOPSY  10/14/2021    LAPAROSCOPIC CHOLECYSTECTOMY WITH CHOLANGIOGRAMS N/A  2018    Procedure: LAPAROSCOPIC CHOLECYSTECTOMY WITH CHOLANGIOGRAMS;  Laparoscopic Cholecystectomy with Intraoperative Cholangiogram;  Surgeon: Erick Rivera MD;  Location: [facility]    TONSILLECTOMY  1967     OB History    Para Term  AB Living   3 3 0 0 0 3   SAB IAB Ectopic Multiple Live Births   0 0 0 0 0      # Outcome Date GA Lbr Domenic/2nd Weight Sex Type Anes PTL Lv   3 Para            2 Para            1 Para               Obstetric Comments   3 C-sections     Current providers sharing in care for this patient include:  Patient Care Team:  Annika Whitmore APRN CNP as PCP - General (Nurse Practitioner - Family)  Annika Whitmore APRN CNP as Assigned PCP    The following health maintenance items are reviewed in Epic and correct as of today:  Health Maintenance   Topic Date Due    RSV VACCINE (Pregnancy & 60+) (1 - 1-dose 60+ series) Never done    Pneumococcal Vaccine: 65+ Years (1 of 1 - PCV) Never done    DTAP/TDAP/TD IMMUNIZATION (2 - Td or Tdap) 10/06/2021    INFLUENZA VACCINE (1) 2023    COVID-19 Vaccine (4 - - season) 2023    LIPID  2024    ANNUAL REVIEW OF HM ORDERS  2024    MEDICARE ANNUAL WELLNESS VISIT  2024    DEXA  2024    PHQ-9  10/26/2024    FALL RISK ASSESSMENT  2025    MAMMO SCREENING  2025    GLUCOSE  2026    ADVANCE CARE PLANNING  2028    COLORECTAL CANCER SCREENING  2033    HEPATITIS C SCREENING  Completed    DEPRESSION ACTION PLAN  Completed    ZOSTER IMMUNIZATION  Completed    IPV IMMUNIZATION  Aged Out    HPV IMMUNIZATION  Aged Out    MENINGITIS IMMUNIZATION  Aged Out    RSV MONOCLONAL ANTIBODY  Aged Out    PAP  Discontinued         Review of Systems  Constitutional, HEENT, cardiovascular, pulmonary, GI, , musculoskeletal, neuro, skin, endocrine and psych systems are negative, except as otherwise noted.     Objective    Exam  /80 (BP Location: Right arm, Patient Position: Sitting,  "Cuff Size: Adult Regular)   Pulse 83   Temp 97.5  F (36.4  C) (Tympanic)   Resp 22   Ht 1.702 m (5' 7\")   Wt 108.6 kg (239 lb 6.4 oz)   LMP 07/01/2002   SpO2 95%   BMI 37.50 kg/m     Estimated body mass index is 37.5 kg/m  as calculated from the following:    Height as of this encounter: 1.702 m (5' 7\").    Weight as of this encounter: 108.6 kg (239 lb 6.4 oz).    Physical Exam  GENERAL: alert and no distress  EYES: Eyes grossly normal to inspection, PERRL and conjunctivae and sclerae normal  HENT: ear canals and TM's normal, nose and mouth without ulcers or lesions  NECK: no adenopathy, no asymmetry, masses, or scars  RESP: lungs clear to auscultation - no rales, rhonchi or wheezes  BREAST: normal without masses, tenderness or nipple discharge and no palpable axillary masses or adenopathy  CV: regular rate and rhythm, normal S1 S2, no S3 or S4, no murmur, click or rub, no peripheral edema  ABDOMEN: soft, nontender, no hepatosplenomegaly, no masses and bowel sounds normal  MS: no gross musculoskeletal defects noted, no edema  SKIN: no suspicious lesions or rashes  NEURO: Normal strength and tone, mentation intact and speech normal  PSYCH: mentation appears normal, affect normal/bright        4/26/2024   Mini Cog   Clock Draw Score 2 Normal   3 Item Recall 2 objects recalled   Mini Cog Total Score 4              Signed Electronically by: Pallavi Gant DNP,, APRN CNP      Chart documentation with Dragon Voice recognition Software. Although reviewed after completion, some words and grammatical errors may remain.   "

## 2024-04-27 LAB — VIT B12 SERPL-MCNC: 341 PG/ML (ref 232–1245)

## 2024-05-08 ENCOUNTER — MYC MEDICAL ADVICE (OUTPATIENT)
Dept: FAMILY MEDICINE | Facility: CLINIC | Age: 69
End: 2024-05-08

## 2024-05-08 NOTE — TELEPHONE ENCOUNTER
135/85 documented on patient reported vitals on assessment flowsheet. Patient was to send PCP a message per last office visit note dated 04/26/24  Ashli DUMONT RN

## 2024-05-15 ENCOUNTER — HOSPITAL ENCOUNTER (OUTPATIENT)
Dept: BONE DENSITY | Facility: CLINIC | Age: 69
Discharge: HOME | End: 2024-05-15
Attending: NURSE PRACTITIONER | Admitting: NURSE PRACTITIONER
Payer: COMMERCIAL

## 2024-05-15 DIAGNOSIS — M81.0 OSTEOPOROSIS WITHOUT CURRENT PATHOLOGICAL FRACTURE, UNSPECIFIED OSTEOPOROSIS TYPE: ICD-10-CM

## 2024-05-15 PROCEDURE — 77080 DXA BONE DENSITY AXIAL: CPT

## 2024-07-01 DIAGNOSIS — F41.1 GENERALIZED ANXIETY DISORDER: ICD-10-CM

## 2024-07-01 DIAGNOSIS — F32.0 MAJOR DEPRESSIVE DISORDER, SINGLE EPISODE, MILD (H): ICD-10-CM

## 2024-07-02 RX ORDER — BUPROPION HYDROCHLORIDE 150 MG/1
150 TABLET ORAL EVERY MORNING
Qty: 90 TABLET | Refills: 2 | Status: SHIPPED | OUTPATIENT
Start: 2024-07-02 | End: 2024-07-03

## 2024-07-03 DIAGNOSIS — M81.0 OSTEOPOROSIS WITHOUT CURRENT PATHOLOGICAL FRACTURE, UNSPECIFIED OSTEOPOROSIS TYPE: ICD-10-CM

## 2024-07-03 DIAGNOSIS — F32.0 MAJOR DEPRESSIVE DISORDER, SINGLE EPISODE, MILD (H): ICD-10-CM

## 2024-07-03 DIAGNOSIS — I10 ESSENTIAL HYPERTENSION, BENIGN: ICD-10-CM

## 2024-07-03 DIAGNOSIS — F41.1 GENERALIZED ANXIETY DISORDER: ICD-10-CM

## 2024-07-03 RX ORDER — BUPROPION HYDROCHLORIDE 150 MG/1
150 TABLET ORAL EVERY MORNING
Qty: 90 TABLET | Refills: 2 | Status: SHIPPED | OUTPATIENT
Start: 2024-07-03

## 2024-07-03 RX ORDER — AMLODIPINE BESYLATE 5 MG/1
5 TABLET ORAL DAILY
Qty: 90 TABLET | Refills: 3 | Status: SHIPPED | OUTPATIENT
Start: 2024-07-03 | End: 2024-07-12

## 2024-07-03 RX ORDER — ALENDRONATE SODIUM 70 MG/1
70 TABLET ORAL
Qty: 12 TABLET | Refills: 2 | Status: SHIPPED | OUTPATIENT
Start: 2024-07-03

## 2024-07-03 RX ORDER — ESCITALOPRAM OXALATE 20 MG/1
20 TABLET ORAL DAILY
Qty: 90 TABLET | Refills: 0 | Status: SHIPPED | OUTPATIENT
Start: 2024-07-03

## 2024-07-03 RX ORDER — METOPROLOL SUCCINATE 25 MG/1
25 TABLET, EXTENDED RELEASE ORAL DAILY
Qty: 90 TABLET | Refills: 0 | Status: SHIPPED | OUTPATIENT
Start: 2024-07-03

## 2024-07-03 NOTE — TELEPHONE ENCOUNTER
Left message on identified VM requesting patient call care team back to clarify what her preferred pharmacy is as she has current refills on file at [facility] in  and [facility].    Julie Behrendt RN

## 2024-07-03 NOTE — TELEPHONE ENCOUNTER
Patient called back and states she wants all the cued up scripts to be sent to [facility] mail [facility], refills sent.      BRENDA Lamb

## 2024-07-05 DIAGNOSIS — F32.0 MAJOR DEPRESSIVE DISORDER, SINGLE EPISODE, MILD: ICD-10-CM

## 2024-07-05 DIAGNOSIS — F41.1 GENERALIZED ANXIETY DISORDER: ICD-10-CM

## 2024-07-05 NOTE — TELEPHONE ENCOUNTER
Medication Question or Refill      What medication are you calling about (include dose and sig)?: Bupropion 24 XL Tab 300 mg     Preferred Pharmacy:    [facility] [place] - [facility] AT [facility]  [place] [address]  Phone: [phone] Fax: [phone]          Who prescribed the medication?: Annika Whitmore      Do you need a refill? Yes    NATY Plata

## 2024-07-06 RX ORDER — BUPROPION HYDROCHLORIDE 300 MG/1
TABLET ORAL
Qty: 90 TABLET | Refills: 1 | OUTPATIENT
Start: 2024-07-06

## 2024-07-12 ENCOUNTER — TELEPHONE (OUTPATIENT)
Dept: FAMILY MEDICINE | Facility: CLINIC | Age: 69
End: 2024-07-12
Payer: COMMERCIAL

## 2024-07-12 DIAGNOSIS — I10 ESSENTIAL HYPERTENSION, BENIGN: ICD-10-CM

## 2024-07-15 RX ORDER — AMLODIPINE BESYLATE 5 MG/1
7.5 TABLET ORAL DAILY
Qty: 135 TABLET | Refills: 3 | Status: SHIPPED | OUTPATIENT
Start: 2024-07-15

## 2024-10-14 ENCOUNTER — MYC MEDICAL ADVICE (OUTPATIENT)
Dept: FAMILY MEDICINE | Facility: CLINIC | Age: 69
End: 2024-10-14

## 2024-11-10 ASSESSMENT — ANXIETY QUESTIONNAIRES
6. BECOMING EASILY ANNOYED OR IRRITABLE: MORE THAN HALF THE DAYS
IF YOU CHECKED OFF ANY PROBLEMS ON THIS QUESTIONNAIRE, HOW DIFFICULT HAVE THESE PROBLEMS MADE IT FOR YOU TO DO YOUR WORK, TAKE CARE OF THINGS AT HOME, OR GET ALONG WITH OTHER PEOPLE: VERY DIFFICULT
2. NOT BEING ABLE TO STOP OR CONTROL WORRYING: NEARLY EVERY DAY
GAD7 TOTAL SCORE: 14
1. FEELING NERVOUS, ANXIOUS, OR ON EDGE: NEARLY EVERY DAY
7. FEELING AFRAID AS IF SOMETHING AWFUL MIGHT HAPPEN: SEVERAL DAYS
GAD7 TOTAL SCORE: 14
GAD7 TOTAL SCORE: 14
8. IF YOU CHECKED OFF ANY PROBLEMS, HOW DIFFICULT HAVE THESE MADE IT FOR YOU TO DO YOUR WORK, TAKE CARE OF THINGS AT HOME, OR GET ALONG WITH OTHER PEOPLE?: VERY DIFFICULT
4. TROUBLE RELAXING: MORE THAN HALF THE DAYS
7. FEELING AFRAID AS IF SOMETHING AWFUL MIGHT HAPPEN: SEVERAL DAYS
5. BEING SO RESTLESS THAT IT IS HARD TO SIT STILL: NOT AT ALL
3. WORRYING TOO MUCH ABOUT DIFFERENT THINGS: NEARLY EVERY DAY

## 2024-11-10 ASSESSMENT — PATIENT HEALTH QUESTIONNAIRE - PHQ9
SUM OF ALL RESPONSES TO PHQ QUESTIONS 1-9: 14
SUM OF ALL RESPONSES TO PHQ QUESTIONS 1-9: 14
10. IF YOU CHECKED OFF ANY PROBLEMS, HOW DIFFICULT HAVE THESE PROBLEMS MADE IT FOR YOU TO DO YOUR WORK, TAKE CARE OF THINGS AT HOME, OR GET ALONG WITH OTHER PEOPLE: VERY DIFFICULT

## 2024-11-11 ENCOUNTER — OFFICE VISIT (OUTPATIENT)
Dept: FAMILY MEDICINE | Facility: CLINIC | Age: 69
End: 2024-11-11
Payer: COMMERCIAL

## 2024-11-11 VITALS
HEIGHT: 67 IN | RESPIRATION RATE: 18 BRPM | BODY MASS INDEX: 38.3 KG/M2 | HEART RATE: 88 BPM | SYSTOLIC BLOOD PRESSURE: 120 MMHG | TEMPERATURE: 98.1 F | OXYGEN SATURATION: 99 % | DIASTOLIC BLOOD PRESSURE: 84 MMHG | WEIGHT: 244 LBS

## 2024-11-11 DIAGNOSIS — E66.01 MORBID OBESITY, UNSPECIFIED OBESITY TYPE (H): Chronic | ICD-10-CM

## 2024-11-11 DIAGNOSIS — I10 ESSENTIAL HYPERTENSION, BENIGN: ICD-10-CM

## 2024-11-11 DIAGNOSIS — F41.1 GENERALIZED ANXIETY DISORDER: ICD-10-CM

## 2024-11-11 DIAGNOSIS — L71.0 PERIORAL DERMATITIS: ICD-10-CM

## 2024-11-11 DIAGNOSIS — F32.0 MAJOR DEPRESSIVE DISORDER, SINGLE EPISODE, MILD (H): Primary | ICD-10-CM

## 2024-11-11 PROCEDURE — 99214 OFFICE O/P EST MOD 30 MIN: CPT | Performed by: NURSE PRACTITIONER

## 2024-11-11 RX ORDER — METOPROLOL SUCCINATE 25 MG/1
25 TABLET, EXTENDED RELEASE ORAL DAILY
Qty: 90 TABLET | Refills: 3 | Status: SHIPPED | OUTPATIENT
Start: 2024-11-11

## 2024-11-11 RX ORDER — BUPROPION HYDROCHLORIDE 300 MG/1
300 TABLET ORAL EVERY MORNING
Qty: 90 TABLET | Refills: 3 | Status: SHIPPED | OUTPATIENT
Start: 2024-11-11

## 2024-11-11 RX ORDER — ESCITALOPRAM OXALATE 20 MG/1
20 TABLET ORAL DAILY
Qty: 90 TABLET | Refills: 3 | Status: SHIPPED | OUTPATIENT
Start: 2024-11-11

## 2024-11-11 RX ORDER — MUPIROCIN 20 MG/G
OINTMENT TOPICAL 3 TIMES DAILY
Qty: 22 G | Refills: 0 | Status: SHIPPED | OUTPATIENT
Start: 2024-11-11

## 2024-11-11 ASSESSMENT — PAIN SCALES - GENERAL: PAINLEVEL_OUTOF10: NO PAIN (0)

## 2024-11-11 NOTE — PROGRESS NOTES
"  Assessment & Plan     Major depressive disorder, single episode, mild (H)  Generalized anxiety disorder  Chronic, significantly worsening due to family issues.  Anxiety feels through the rough.  Feeling unmotivated and lethargic.  Did start seeing a therapist a couple of months ago.  Is learning about setting boundaries and this is helping some.  Previously tried to decrease dose of Wellbutrin 250 mg, however that was worse for patient so did go up to 300 mg again.  Will continue at same doses of medications, patient feels she is okay on these doses at this time and continue her therapy.  Patient to notify provider if needs any changes.  - escitalopram (LEXAPRO) 20 MG tablet; Take 1 tablet (20 mg) by mouth daily.  - buPROPion (WELLBUTRIN XL) 300 MG 24 hr tablet; Take 1 tablet (300 mg) by mouth every morning.    Essential hypertension, benign  Chronic, stable.  Blood pressure below target goal in office today.  Continue metoprolol without any changes.  - metoprolol succinate ER (TOPROL XL) 25 MG 24 hr tablet; Take 1 tablet (25 mg) by mouth daily.     obesity, unspecified obesity type (HCC)  Chronic, difficulty losing, knows she can work on her nutrition.  Discussed nutrition referral, patient is open to this.  Referral placed.  - Adult Nutrition  Referral; Future    Perioral dermatitis  Chronic, intermittent dermatitis.  Sometimes Bactroban not always working.  Advised could  over-the-counter hydrocortisone cream and mix a little with Bactroban when applying.  - mupirocin (BACTROBAN) 2 % external ointment; Apply topically 3 times daily.          BMI  Estimated body mass index is 38.22 kg/m  as calculated from the following:    Height as of this encounter: 1.702 m (5' 7\").    Weight as of this encounter: 110.7 kg (244 lb).   Weight management plan: Discussed healthy diet and exercise guidelines      See Patient Instructions    Subjective   Danika is a 68 year old, presenting for the following health " "issues:  Anxiety, Depression, and Weight Problem        11/11/2024     1:38 PM   Additional Questions   Roomed by Cyndi MONACO   Accompanied by Self     History of Present Illness       Mental Health Follow-up:  Patient presents to follow-up on Depression & Anxiety.Patient's depression since last visit has been:  Worse  The patient is having other symptoms associated with depression.  Patient's anxiety since last visit has been:  Worse  The patient is having other symptoms associated with anxiety.  Any significant life events: relationship concerns and health concerns  Patient is feeling anxious or having panic attacks.  Patient has no concerns about alcohol or drug use.    Reason for visit:  Med Review,  Weight Management  and appetite suppressent    She eats 2-3 servings of fruits and vegetables daily.She consumes 0 sweetened beverage(s) daily.She exercises with enough effort to increase her heart rate 9 or less minutes per day.  She exercises with enough effort to increase her heart rate 3 or less days per week.   She is taking medications regularly.     Anxiety feels is through the roof   Unmotivated and lethargic  Is seeing a therapist ~started 2 months ago   Worries about things, hard time letting things go       Review of Systems  Constitutional, HEENT, cardiovascular, pulmonary, gi and gu systems are negative, except as otherwise noted.      Objective    /84   Pulse 88   Temp 98.1  F (36.7  C) (Tympanic)   Resp 18   Ht 1.702 m (5' 7\")   Wt 110.7 kg (244 lb)   LMP 07/01/2002   SpO2 99%   BMI 38.22 kg/m    Body mass index is 38.22 kg/m .  Physical Exam   GENERAL: alert and no distress  NECK: no adenopathy, no asymmetry, masses, or scars  RESP: lungs clear to auscultation - no rales, rhonchi or wheezes  CV: regular rate and rhythm, normal S1 S2, no S3 or S4, no murmur, click or rub, no peripheral edema  ABDOMEN: soft, nontender, no hepatosplenomegaly, no masses and bowel sounds normal  MS: no gross " musculoskeletal defects noted, no edema  SKIN: no suspicious lesions or rashes  NEURO: Normal strength and tone, mentation intact and speech normal  PSYCH: mentation appears normal, affect normal/bright, and anxious    Diagnostic Test Results:  Labs reviewed in Epic  none          Signed Electronically by: Pallavi Gant DNP,, TERRA CNP      Chart documentation with Dragon Voice recognition Software. Although reviewed after completion, some words and grammatical errors may remain.

## 2024-11-11 NOTE — NURSING NOTE
"Chief Complaint   Patient presents with    Anxiety    Depression    Weight Problem       Initial /84   Pulse 88   Temp 98.1  F (36.7  C) (Tympanic)   Resp 18   Ht 1.702 m (5' 7\")   Wt 110.7 kg (244 lb)   LMP 07/01/2002   SpO2 99%   BMI 38.22 kg/m   Estimated body mass index is 38.22 kg/m  as calculated from the following:    Height as of this encounter: 1.702 m (5' 7\").    Weight as of this encounter: 110.7 kg (244 lb).    Patient presents to the clinic using No DME    Is there anyone who you would like to be able to receive your results? No  If yes have patient fill out DARELL      "

## 2024-11-12 NOTE — PATIENT INSTRUCTIONS
Major depressive disorder, single episode, mild (H)  Generalized anxiety disorder  Chronic, significantly worsening due to family issues.  Anxiety feels through the rough.  Feeling unmotivated and lethargic.  Did start seeing a therapist a couple of months ago.  Is learning about setting boundaries and this is helping some.  Previously tried to decrease dose of Wellbutrin 250 mg, however that was worse for patient so did go up to 300 mg again.  Will continue at same doses of medications, patient feels she is okay on these doses at this time and continue her therapy.  Patient to notify provider if needs any changes.  - escitalopram (LEXAPRO) 20 MG tablet; Take 1 tablet (20 mg) by mouth daily.  - buPROPion (WELLBUTRIN XL) 300 MG 24 hr tablet; Take 1 tablet (300 mg) by mouth every morning.    Essential hypertension, benign  Chronic, stable.  Blood pressure below target goal in office today.  Continue metoprolol without any changes.  - metoprolol succinate ER (TOPROL XL) 25 MG 24 hr tablet; Take 1 tablet (25 mg) by mouth daily.     obesity, unspecified obesity type (HCC)  Chronic, difficulty losing, knows she can work on her nutrition.  Discussed nutrition referral, patient is open to this.  Referral placed.  - Adult Nutrition  Referral; Future    Perioral dermatitis  Chronic, intermittent dermatitis.  Sometimes Bactroban not always working.  Advised could  over-the-counter hydrocortisone cream and mix a little with Bactroban when applying.  - mupirocin (BACTROBAN) 2 % external ointment; Apply topically 3 times daily.

## 2024-12-19 ENCOUNTER — MYC MEDICAL ADVICE (OUTPATIENT)
Dept: FAMILY MEDICINE | Facility: CLINIC | Age: 69
End: 2024-12-19

## 2024-12-20 NOTE — TELEPHONE ENCOUNTER
Annika,     Please see patients my chart message. Is it okay to offer her a same day spot on 12/30/2024?    Blanche CRONIN CMA

## 2025-01-28 ENCOUNTER — OFFICE VISIT (OUTPATIENT)
Dept: URGENT CARE | Facility: URGENT CARE | Age: 70
End: 2025-01-28
Payer: COMMERCIAL

## 2025-01-28 VITALS
SYSTOLIC BLOOD PRESSURE: 150 MMHG | WEIGHT: 250 LBS | HEART RATE: 69 BPM | TEMPERATURE: 97.6 F | BODY MASS INDEX: 39.16 KG/M2 | OXYGEN SATURATION: 98 % | RESPIRATION RATE: 20 BRPM | DIASTOLIC BLOOD PRESSURE: 98 MMHG

## 2025-01-28 DIAGNOSIS — R30.0 DYSURIA: Primary | ICD-10-CM

## 2025-01-28 LAB
ALBUMIN UR-MCNC: NEGATIVE MG/DL
APPEARANCE UR: CLEAR
BACTERIA #/AREA URNS HPF: ABNORMAL /HPF
BILIRUB UR QL STRIP: NEGATIVE
COLOR UR AUTO: YELLOW
GLUCOSE UR STRIP-MCNC: NEGATIVE MG/DL
HGB UR QL STRIP: ABNORMAL
KETONES UR STRIP-MCNC: NEGATIVE MG/DL
LEUKOCYTE ESTERASE UR QL STRIP: ABNORMAL
NITRATE UR QL: NEGATIVE
PH UR STRIP: 5.5 [PH] (ref 5–7)
RBC #/AREA URNS AUTO: ABNORMAL /HPF
SP GR UR STRIP: 1.02 (ref 1–1.03)
SQUAMOUS #/AREA URNS AUTO: ABNORMAL /LPF
UROBILINOGEN UR STRIP-ACNC: 0.2 E.U./DL
WBC #/AREA URNS AUTO: ABNORMAL /HPF

## 2025-01-28 PROCEDURE — 87086 URINE CULTURE/COLONY COUNT: CPT | Performed by: PHYSICIAN ASSISTANT

## 2025-01-28 PROCEDURE — 81001 URINALYSIS AUTO W/SCOPE: CPT | Performed by: PHYSICIAN ASSISTANT

## 2025-01-28 PROCEDURE — 99213 OFFICE O/P EST LOW 20 MIN: CPT | Performed by: PHYSICIAN ASSISTANT

## 2025-01-28 RX ORDER — NITROFURANTOIN 25; 75 MG/1; MG/1
100 CAPSULE ORAL 2 TIMES DAILY
Qty: 10 CAPSULE | Refills: 0 | Status: SHIPPED | OUTPATIENT
Start: 2025-01-28 | End: 2025-02-02

## 2025-01-29 ENCOUNTER — TELEPHONE (OUTPATIENT)
Dept: FAMILY MEDICINE | Facility: CLINIC | Age: 70
End: 2025-01-29
Payer: COMMERCIAL

## 2025-01-29 LAB — BACTERIA UR CULT: NORMAL

## 2025-01-29 NOTE — TELEPHONE ENCOUNTER
Order/Referral Request    Who is requesting: was at [facility] on 1/28 for possible kidney stone.  Would like to get a CT scan   Please call pt to schedule when order is put in     Orders being requested: CT Scan   Reason service is needed/diagnosis: Kidney stone     When are orders needed by: asap    Has this been discussed with Provider: No    Does patient have a preference on a Group/Provider/Facility? [place]     Does patient have an appointment scheduled?: No    Where to send orders: Place orders within Epic    Could we send this information to you in [employer] or would you prefer to receive a phone call?:   Patient would prefer a phone call   Okay to leave a detailed message?: Yes at Cell number on file:    Telephone Information:   Mobile [phone]

## 2025-01-29 NOTE — PROGRESS NOTES
Assessment & Plan     Dysuria  Culture pending. Will treat with macrobid. Push fluids. Return to clinic if symptoms worsen or do not improve; otherwise follow up as needed     - UA Macroscopic with reflex to Microscopic and Culture  - UA Microscopic with Reflex to Culture  - Urine Culture Aerobic Bacterial - lab collect; Future  - nitroFURantoin macrocrystal-monohydrate (MACROBID) 100 MG capsule; Take 1 capsule (100 mg) by mouth 2 times daily for 5 days.  - Urine Culture Aerobic Bacterial - lab collect                Return in about 3 days (around 1/31/2025), or if symptoms worsen or fail to improve.        Subjective   Chief Complaint   Patient presents with    Urinary Problem     Pain in right side/back. Pain in the groin area. Worried about UTI or kidney stones. Started 3-4 days ago.         HPI     UTI    Onset of symptoms was 3day(s).  Course of illness is same  Severity moderate  Current and associated symptoms dysuria and frequency  Treatment and measures tried Increase fluid intake  Predisposing factors include kidney stones                    Objective    BP (!) 150/98   Pulse 69   Temp 97.6  F (36.4  C) (Tympanic)   Resp 20   Wt 113.4 kg (250 lb)   LMP 07/01/2002   SpO2 98%   BMI 39.16 kg/m    Body mass index is 39.16 kg/m .  Physical Exam  Constitutional:       General: She is not in acute distress.     Appearance: She is well-developed.   Psychiatric:         Behavior: Behavior normal.            Results for orders placed or performed in visit on 01/28/25 (from the past 24 hours)   UA Macroscopic with reflex to Microscopic and Culture    Specimen: Urine, Clean Catch   Result Value Ref Range    Color Urine Yellow Colorless, Straw, Light Yellow, Yellow    Appearance Urine Clear Clear    Glucose Urine Negative Negative mg/dL    Bilirubin Urine Negative Negative    Ketones Urine Negative Negative mg/dL    Specific Gravity Urine 1.020 1.003 - 1.035    Blood Urine Trace (A) Negative    pH Urine 5.5  5.0 - 7.0    Protein Albumin Urine Negative Negative mg/dL    Urobilinogen Urine 0.2 0.2, 1.0 E.U./dL    Nitrite Urine Negative Negative    Leukocyte Esterase Urine Small (A) Negative   UA Microscopic with Reflex to Culture   Result Value Ref Range    Bacteria Urine Few (A) None Seen /HPF    RBC Urine 0-2 0-2 /HPF /HPF    WBC Urine 5-10 (A) 0-5 /HPF /HPF    Squamous Epithelials Urine Few (A) None Seen /LPF    Narrative    Urine Culture not indicated           Signed Electronically by: Karishma Ford PA-C

## 2025-01-29 NOTE — TELEPHONE ENCOUNTER
"S-(situation):     \"My urine test was negative and Karishma told me if it (pain) increases at all, to call right away and get a CT Scan.  It isn't getting any better and I have had a Kidney scan before and I know it will take a few days to get in for the scan\"    B-(background):     Seen last night in Urgent care for Dysuria.      A-(assessment):     \"The pain has moved down to my groin and my abdomen. I do think I have seen some occasional dark spots in my urine. I don't want this to keep progressing and I don't want to wait that long to get a scan, I know what it feels like to have a kidney stone.I had one ten years ago or so\"    Rates pain: \"5 or 6\" of 10.    \"Taking just tylenol and it helps only a little while.     Pressure is present and is urinating .   Denies blood in urine is present. .   Afebrile.     R-(recommendations):     Continue fluids.    I did advise you usually do need to be seen for follow up to have a CT Scan ordered as  does not do any follow up care.       Please advise. Tahira Giang RN            "

## 2025-01-30 NOTE — TELEPHONE ENCOUNTER
FYI - Status Update    Who is Calling: patient    Update: Pt requesting update on this asap.    Does caller want a call/response back: Yes     Could we send this information to you in [facility] or would you prefer to receive a phone call?:   Patient would prefer a phone call   Okay to leave a detailed message?: Yes at Cell number on file:    Telephone Information:   Mobile [phone]

## 2025-01-30 NOTE — TELEPHONE ENCOUNTER
Patient reports that she is feeling better. She denies fever, flank pain. Advised if pain comes back, she gets fever she needs to be re-evaluated. She verbalizes understanding.     Kathy Armstrong RN on 1/30/2025 at 3:34 PM

## 2025-02-05 ENCOUNTER — OFFICE VISIT (OUTPATIENT)
Dept: FAMILY MEDICINE | Facility: CLINIC | Age: 70
End: 2025-02-05
Payer: COMMERCIAL

## 2025-02-05 VITALS
SYSTOLIC BLOOD PRESSURE: 122 MMHG | BODY MASS INDEX: 38.01 KG/M2 | RESPIRATION RATE: 18 BRPM | HEIGHT: 67 IN | HEART RATE: 82 BPM | OXYGEN SATURATION: 100 % | TEMPERATURE: 97.8 F | WEIGHT: 242.2 LBS | DIASTOLIC BLOOD PRESSURE: 84 MMHG

## 2025-02-05 DIAGNOSIS — E66.01 MORBID OBESITY, UNSPECIFIED OBESITY TYPE (H): ICD-10-CM

## 2025-02-05 DIAGNOSIS — F33.1 MODERATE RECURRENT MAJOR DEPRESSION (H): ICD-10-CM

## 2025-02-05 DIAGNOSIS — I10 ESSENTIAL HYPERTENSION: ICD-10-CM

## 2025-02-05 DIAGNOSIS — Z87.440 PERSONAL HISTORY OF URINARY TRACT INFECTION: Primary | ICD-10-CM

## 2025-02-05 PROCEDURE — G2211 COMPLEX E/M VISIT ADD ON: HCPCS | Performed by: NURSE PRACTITIONER

## 2025-02-05 PROCEDURE — 99214 OFFICE O/P EST MOD 30 MIN: CPT | Performed by: NURSE PRACTITIONER

## 2025-02-05 ASSESSMENT — ANXIETY QUESTIONNAIRES
5. BEING SO RESTLESS THAT IT IS HARD TO SIT STILL: NOT AT ALL
1. FEELING NERVOUS, ANXIOUS, OR ON EDGE: MORE THAN HALF THE DAYS
GAD7 TOTAL SCORE: 9
7. FEELING AFRAID AS IF SOMETHING AWFUL MIGHT HAPPEN: NOT AT ALL
2. NOT BEING ABLE TO STOP OR CONTROL WORRYING: MORE THAN HALF THE DAYS
GAD7 TOTAL SCORE: 9
7. FEELING AFRAID AS IF SOMETHING AWFUL MIGHT HAPPEN: NOT AT ALL
GAD7 TOTAL SCORE: 9
4. TROUBLE RELAXING: MORE THAN HALF THE DAYS
IF YOU CHECKED OFF ANY PROBLEMS ON THIS QUESTIONNAIRE, HOW DIFFICULT HAVE THESE PROBLEMS MADE IT FOR YOU TO DO YOUR WORK, TAKE CARE OF THINGS AT HOME, OR GET ALONG WITH OTHER PEOPLE: SOMEWHAT DIFFICULT
3. WORRYING TOO MUCH ABOUT DIFFERENT THINGS: MORE THAN HALF THE DAYS
6. BECOMING EASILY ANNOYED OR IRRITABLE: SEVERAL DAYS
8. IF YOU CHECKED OFF ANY PROBLEMS, HOW DIFFICULT HAVE THESE MADE IT FOR YOU TO DO YOUR WORK, TAKE CARE OF THINGS AT HOME, OR GET ALONG WITH OTHER PEOPLE?: SOMEWHAT DIFFICULT

## 2025-02-05 ASSESSMENT — PATIENT HEALTH QUESTIONNAIRE - PHQ9
SUM OF ALL RESPONSES TO PHQ QUESTIONS 1-9: 10
SUM OF ALL RESPONSES TO PHQ QUESTIONS 1-9: 10
10. IF YOU CHECKED OFF ANY PROBLEMS, HOW DIFFICULT HAVE THESE PROBLEMS MADE IT FOR YOU TO DO YOUR WORK, TAKE CARE OF THINGS AT HOME, OR GET ALONG WITH OTHER PEOPLE: SOMEWHAT DIFFICULT

## 2025-02-05 ASSESSMENT — PAIN SCALES - GENERAL: PAINLEVEL_OUTOF10: MILD PAIN (3)

## 2025-02-05 NOTE — NURSING NOTE
"Chief Complaint   Patient presents with    Hypertension    Weight Check    Back Pain       Initial LMP 07/01/2002  Estimated body mass index is 39.16 kg/m  as calculated from the following:    Height as of 11/11/24: 1.702 m (5' 7\").    Weight as of 1/28/25: 113.4 kg (250 lb).    Patient presents to the clinic using No DME    Is there anyone who you would like to be able to receive your results? No  If yes have patient fill out DARELL      "

## 2025-02-05 NOTE — PROGRESS NOTES
Assessment & Plan     Personal history of urinary tract infection  Okay would probably patient with recent history of painful urination, seen in urgent care.  Covered with prophylactic Macrobid for presumed urinary tract infection.  Urine culture grew back no bacteria.  Patient symptoms are improved.  Will continue to monitor follow-up if worsening.    Morbid obesity, unspecified obesity type (H)  Chronic history of difficulty losing weight.  Discussed diet, exercise, stress management and sleep.  Patient has been working on diet and exercise.  Nutrition referral placed previously, patient has not scheduled yet.  He is looking into hypnosis.  Discussed weight loss medication again, patient would like to trial and see if insurance will cover.  Will start low-dose Zepbound, patient to notify provider for refill on next dose in approximately 3 weeks and again the following month.  Patient to follow-up in 3 months for recheck.  - tirzepatide-Weight Management (ZEPBOUND) 2.5 MG/0.5ML prefilled pen; Inject 0.5 mLs (2.5 mg) subcutaneously every 7 days.    Moderate recurrent major depression (H)  Chronic, stable on current dose of Wellbutrin and Lexapro.  Continue without any changes.    Essential hypertension  Chronic, currently stable.  Patient has noted a couple recent elevated blood pressures at the dentist and most recently in the urgent care.  Does not otherwise check at home.  Given normal blood pressure in office today, would continue to monitor without any changes today.        See Patient Instructions    Subjective   Danika is a 69 year old, presenting for the following health issues:  Hypertension, Weight Check, and Back Pain        2/5/2025    10:12 AM   Additional Questions   Roomed by Marina ONTIVEROS([facility])     History of Present Illness       Back Pain:  She presents for follow up of back pain. Patient's back pain is a recurring problem.  Location of back pain:  Right lower back and right middle of back  Description of  back pain: dull ache  Back pain spreads: nowhere    Since patient first noticed back pain, pain is: always present, but gets better and worse  Does back pain interfere with her job:  Not applicable       She eats 2-3 servings of fruits and vegetables daily.She consumes 0 sweetened beverage(s) daily.She exercises with enough effort to increase her heart rate 9 or less minutes per day.  She exercises with enough effort to increase her heart rate 3 or less days per week. She is missing 1 dose(s) of medications per week.     Comes and goes  Has had a history of kidney stones ~ this episode of Urinary Tract Infection was not like she's had previously  Still in right lower back ~ not bad right now.       ED/UC Followup:    Facility:  [facility]   Date of visit: 1/28/25  Reason for visit: UTI  Current Status: Pain in the right lower side of the back  - urination is okay  now -   ( Pt wonders if the medication for osteoporosis could cause the kidney stone )     Yellow Stool - wonder if is because her antibiotic done during UC 1/28/25 Macrobid      Weigh check  Wt Readings from Last 4 Encounters:   02/05/25 109.9 kg (242 lb 3.2 oz)   01/28/25 113.4 kg (250 lb)   11/11/24 110.7 kg (244 lb)   04/26/24 108.6 kg (239 lb 6.4 oz)     Vaccines: Declined all today       Hypertension Follow-up    Do you check your blood pressure regularly outside of the clinic? No   Are you following a low salt diet? No  Are your blood pressures ever more than 140 on the top number (systolic) OR more   than 90 on the bottom number (diastolic), for example 140/90? N/A    Has had significantly elevated blood pressure in the past few months ~ at the dentist and at Urgent Care     BP Readings from Last 6 Encounters:   02/05/25 (!) 140/82   01/28/25 (!) 150/98   11/11/24 120/84   04/26/24 132/80   04/19/23 118/88   03/01/23 110/76             Review of Systems  Constitutional, HEENT, cardiovascular, pulmonary, gi and gu  "systems are negative, except as otherwise noted.      Objective    BP (!) 140/82   Pulse 82   Temp 97.8  F (36.6  C) (Tympanic)   Resp 18   Ht 1.7 m (5' 6.93\")   Wt 109.9 kg (242 lb 3.2 oz)   LMP 07/01/2002   SpO2 100%   BMI 38.01 kg/m    Body mass index is 38.01 kg/m .  Physical Exam   GENERAL: alert and no distress  NECK: no adenopathy, no asymmetry, masses, or scars  RESP: lungs clear to auscultation - no rales, rhonchi or wheezes  CV: regular rate and rhythm, normal S1 S2, no S3 or S4, no murmur, click or rub, no peripheral edema  ABDOMEN: soft, nontender, no hepatosplenomegaly, no masses and bowel sounds normal  MS: no gross musculoskeletal defects noted, no edema  NEURO: Normal strength and tone, mentation intact and speech normal  PSYCH: mentation appears normal, affect normal/bright    Diagnostic Test Results:  none          Signed Electronically by: Annika Olivo DNP      Chart documentation with Dragon Voice recognition Software. Although reviewed after completion, some words and grammatical errors may remain.   "

## 2025-02-06 NOTE — PATIENT INSTRUCTIONS
Personal history of urinary tract infection  Okay would probably patient with recent history of painful urination, seen in urgent care.  Covered with prophylactic Macrobid for presumed urinary tract infection.  Urine culture grew back no bacteria.  Patient symptoms are improved.  Will continue to monitor follow-up if worsening.    Morbid obesity, unspecified obesity type (H)  Chronic history of difficulty losing weight.  Discussed diet, exercise, stress management and sleep.  Patient has been working on diet and exercise.  Nutrition referral placed previously, patient has not scheduled yet.  He is looking into hypnosis.  Discussed weight loss medication again, patient would like to trial and see if insurance will cover.  Will start low-dose Zepbound, patient to notify provider for refill on next dose in approximately 3 weeks and again the following month.  Patient to follow-up in 3 months for recheck.  - tirzepatide-Weight Management (ZEPBOUND) 2.5 MG/0.5ML prefilled pen; Inject 0.5 mLs (2.5 mg) subcutaneously every 7 days.    Moderate recurrent major depression (H)  Chronic, stable on current dose of Wellbutrin and Lexapro.  Continue without any changes.    Essential hypertension  Chronic, currently stable.  Patient has noted a couple recent elevated blood pressures at the dentist and most recently in the urgent care.  Does not otherwise check at home.  Given normal blood pressure in office today, would continue to monitor without any changes today.

## 2025-02-14 ENCOUNTER — MYC MEDICAL ADVICE (OUTPATIENT)
Dept: FAMILY MEDICINE | Facility: CLINIC | Age: 70
End: 2025-02-14
Payer: COMMERCIAL

## 2025-02-14 DIAGNOSIS — E66.01 MORBID OBESITY, UNSPECIFIED OBESITY TYPE (H): Primary | ICD-10-CM

## 2025-02-20 ENCOUNTER — TELEPHONE (OUTPATIENT)
Dept: FAMILY MEDICINE | Facility: CLINIC | Age: 70
End: 2025-02-20
Payer: COMMERCIAL

## 2025-02-20 DIAGNOSIS — E66.01 MORBID OBESITY, UNSPECIFIED OBESITY TYPE (H): ICD-10-CM

## 2025-02-20 NOTE — TELEPHONE ENCOUNTER
Medication Question or Refill    Contacts       Contact Date/Time Type Contact Phone/Fax    02/20/2025 04:16 PM CST Phone (Incoming) Danika Lerner (Self) [phone] (H)            What medication are you calling about (include dose and sig)?: Pending Prescriptions:                       Disp   Refills    semaglutide-weight management (WEGOVY) 0.*2 mL   0            Sig: Inject 0.5 mLs (0.25 mg) subcutaneously once a           week.     Preferred Pharmacy:   [facility] [address] [place] [id number]  Phone: [phone] Fax: [phone]    Controlled Substance Agreement on file:   CSA -- Patient Level:    CSA: None found at the patient level.       Who prescribed the medication?: PCP:   Annika Whitmore DNP     Do you need a refill? Yes    When did you use the medication last? Has not used yet , [place] was just approved and pharmacy needed the script     Patient offered an appointment? No    Do you have any questions or concerns?  No      Could we send this information to you in [facility] or would you prefer to receive a phone call?:   Patient would prefer a phone call   Okay to leave a detailed message?: Yes at Home number on file [phone] (home)    Ashly Jeter/ Patient

## 2025-02-21 NOTE — TELEPHONE ENCOUNTER
Actually looks like zepbound was covered. Will send previously prescribed dose to pharmacy on behalf of Annika.     Dang Christie MD

## 2025-03-27 ENCOUNTER — PATIENT OUTREACH (OUTPATIENT)
Dept: CARE COORDINATION | Facility: CLINIC | Age: 70
End: 2025-03-27
Payer: COMMERCIAL

## 2025-04-10 ENCOUNTER — PATIENT OUTREACH (OUTPATIENT)
Dept: CARE COORDINATION | Facility: CLINIC | Age: 70
End: 2025-04-10
Payer: COMMERCIAL

## 2025-04-17 ENCOUNTER — PATIENT OUTREACH (OUTPATIENT)
Dept: CARE COORDINATION | Facility: CLINIC | Age: 70
End: 2025-04-17
Payer: COMMERCIAL

## 2025-04-25 SDOH — HEALTH STABILITY: PHYSICAL HEALTH: ON AVERAGE, HOW MANY MINUTES DO YOU ENGAGE IN EXERCISE AT THIS LEVEL?: 10 MIN

## 2025-04-25 SDOH — HEALTH STABILITY: PHYSICAL HEALTH: ON AVERAGE, HOW MANY DAYS PER WEEK DO YOU ENGAGE IN MODERATE TO STRENUOUS EXERCISE (LIKE A BRISK WALK)?: 1 DAY

## 2025-04-25 ASSESSMENT — SOCIAL DETERMINANTS OF HEALTH (SDOH): HOW OFTEN DO YOU GET TOGETHER WITH FRIENDS OR RELATIVES?: TWICE A WEEK

## 2025-04-29 NOTE — PATIENT INSTRUCTIONS
Patient Education   Preventive Care Advice   This is general advice given by our system to help you stay healthy. However, your care team may have specific advice just for you. Please talk to your care team about your preventive care needs.  Nutrition  Eat 5 or more servings of fruits and vegetables each day.  Try wheat bread, brown rice and whole grain pasta (instead of white bread, rice, and pasta).  Get enough calcium and vitamin D. Check the label on foods and aim for 100% of the RDA (recommended daily allowance).  Lifestyle  Exercise at least 150 minutes each week  (30 minutes a day, 5 days a week).  Do muscle strengthening activities 2 days a week. These help control your weight and prevent disease.  No smoking.  Wear sunscreen to prevent skin cancer.  Have a dental exam and cleaning every 6 months.  Yearly exams  See your health care team every year to talk about:  Any changes in your health.  Any medicines your care team has prescribed.  Preventive care, family planning, and ways to prevent chronic diseases.  Shots (vaccines)   HPV shots (up to age 26), if you've never had them before.  Hepatitis B shots (up to age 59), if you've never had them before.  COVID-19 shot: Get this shot when it's due.  Flu shot: Get a flu shot every year.  Tetanus shot: Get a tetanus shot every 10 years.  Pneumococcal, hepatitis A, and RSV shots: Ask your care team if you need these based on your risk.  Shingles shot (for age 50 and up)  General health tests  Diabetes screening:  Starting at age 35, Get screened for diabetes at least every 3 years.  If you are younger than age 35, ask your care team if you should be screened for diabetes.  Cholesterol test: At age 39, start having a cholesterol test every 5 years, or more often if advised.  Bone density scan (DEXA): At age 50, ask your care team if you should have this scan for osteoporosis (brittle bones).  Hepatitis C: Get tested at least once in your life.  STIs (sexually  transmitted infections)  Before age 24: Ask your care team if you should be screened for STIs.  After age 24: Get screened for STIs if you're at risk. You are at risk for STIs (including HIV) if:  You are sexually active with more than one person.  You don't use condoms every time.  You or a partner was diagnosed with a sexually transmitted infection.  If you are at risk for HIV, ask about PrEP medicine to prevent HIV.  Get tested for HIV at least once in your life, whether you are at risk for HIV or not.  Cancer screening tests  Cervical cancer screening: If you have a cervix, begin getting regular cervical cancer screening tests starting at age 21.  Breast cancer scan (mammogram): If you've ever had breasts, begin having regular mammograms starting at age 40. This is a scan to check for breast cancer.  Colon cancer screening: It is important to start screening for colon cancer at age 45.  Have a colonoscopy test every 10 years (or more often if you're at risk) Or, ask your provider about stool tests like a FIT test every year or Cologuard test every 3 years.  To learn more about your testing options, visit:   .  For help making a decision, visit:   https://bit.ly/jp25229.  Prostate cancer screening test: If you have a prostate, ask your care team if a prostate cancer screening test (PSA) at age 55 is right for you.  Lung cancer screening: If you are a current or former smoker ages 50 to 80, ask your care team if ongoing lung cancer screenings are right for you.  For informational purposes only. Not to replace the advice of your health care provider. Copyright   2023 [facility]. All rights reserved. Clinically reviewed by the [facility]. eTipping [id number] - REV 01/24.  Preventing Falls: Care Instructions  Injuries and health problems such as trouble walking or poor eyesight can increase your risk of falling. So can some medicines. But there are things you can do to help  "prevent falls. You can exercise to get stronger. You can also arrange your home to make it safer.    Talk to your doctor about the medicines you take. Ask if any of them increase the risk of falls and whether they can be changed or stopped.   Try to exercise regularly. It can help improve your strength and balance. This can help lower your risk of falling.         Practice fall safety and prevention.   Wear low-heeled shoes that fit well and give your feet good support. Talk to your doctor if you have foot problems that make this hard.  Carry a cellphone or wear a medical alert device that you can use to call for help.  Use stepladders instead of chairs to reach high objects. Don't climb if you're at risk for falls. Ask for help, if needed.  Wear the correct eyeglasses, if you need them.        Make your home safer.   Remove rugs, cords, clutter, and furniture from walkways.  Keep your house well lit. Use night-lights in hallways and bathrooms.  Install and use sturdy handrails on stairways.  Wear nonskid footwear, even inside. Don't walk barefoot or in socks without shoes.        Be safe outside.   Use handrails, curb cuts, and ramps whenever possible.  Keep your hands free by using a shoulder bag or backpack.  Try to walk in well-lit areas. Watch out for uneven ground, changes in pavement, and debris.  Be careful in the winter. Walk on the grass or gravel when sidewalks are slippery. Use de-icer on steps and walkways. Add non-slip devices to shoes.    Put grab bars and nonskid mats in your shower or tub and near the toilet. Try to use a shower chair or bath bench when bathing.   Get into a tub or shower by putting in your weaker leg first. Get out with your strong side first. Have a phone or medical alert device in the bathroom with you.   Where can you learn more?  Go to https://www.[employer].net/patiented  Enter G117 in the search box to learn more about \"Preventing Falls: Care Instructions.\"  Current as of: " July 31, 2024  Content Version: 14.4    1119-6390 [employer].   Care instructions adapted under license by your healthcare professional. If you have questions about a medical condition or this instruction, always ask your healthcare professional. [employer] disclaims any warranty or liability for your use of this information.    Hearing Loss: Care Instructions  Overview     Hearing loss is a sudden or slow decrease in how well you hear. It can range from slight to profound. Permanent hearing loss can occur with aging. It also can happen when you are exposed long-term to loud noise. Examples include listening to loud music, riding motorcycles, or being around other loud machines.  Hearing loss can affect your work and home life. It can make you feel lonely or depressed. You may feel that you have lost your independence. But hearing aids and other devices can help you hear better and feel connected to others.  Follow-up care is a key part of your treatment and safety. Be sure to make and go to all appointments, and call your doctor if you are having problems. It's also a good idea to know your test results and keep a list of the medicines you take.  How can you care for yourself at home?  Avoid loud noises whenever possible. This helps keep your hearing from getting worse.  Always wear hearing protection around loud noises.  Wear a hearing aid as directed.  A professional can help you pick a hearing aid that will work best for you.  You can also get hearing aids over the counter for mild to moderate hearing loss.  Have hearing tests as your doctor suggests. They can show whether your hearing has changed. Your hearing aid may need to be adjusted.  Use other devices as needed. These may include:  Telephone amplifiers and hearing aids that can connect to a television, stereo, radio, or microphone.  Devices that use lights or vibrations. These alert you to the doorbell, a ringing telephone, or a  "baby monitor.  Television closed-captioning. This shows the words at the bottom of the screen. Most new TVs can do this.  TTY (text telephone). This lets you type messages back and forth on the telephone instead of talking or listening. These devices are also called TDD. When messages are typed on the keyboard, they are sent over the phone line to a receiving TTY. The message is shown on a monitor.  Use text messaging, social media, and email if it is hard for you to communicate by telephone.  Try to learn a listening technique called speechreading. It is not lipreading. You pay attention to people's gestures, expressions, posture, and tone of voice. These clues can help you understand what a person is saying. Face the person you are talking to, and have them face you. Make sure the lighting is good. You need to see the other person's face clearly.  Think about counseling if you need help to adjust to your hearing loss.  When should you call for help?  Watch closely for changes in your health, and be sure to contact your doctor if:    You think your hearing is getting worse.     You have new symptoms, such as dizziness or nausea.   Where can you learn more?  Go to https://www.[facility].net/patiented  Enter R798 in the search box to learn more about \"Hearing Loss: Care Instructions.\"  Current as of: October 27, 2024  Content Version: 14.4    3292-2186 [employer].   Care instructions adapted under license by your healthcare professional. If you have questions about a medical condition or this instruction, always ask your healthcare professional. [employer] disclaims any warranty or liability for your use of this information.    Learning About Stress  What is stress?     Stress is your body's response to a hard situation. Your body can have a physical, emotional, or mental response. Stress is a fact of life for most people, and it affects everyone differently. What causes stress for you may not " be stressful for someone else.  A lot of things can cause stress. You may feel stress when you go on a job interview, take a test, or run a race. This kind of short-term stress is normal and even useful. It can help you if you need to work hard or react quickly. For example, stress can help you finish an important job on time.  Long-term stress is caused by ongoing stressful situations or events. Examples of long-term stress include long-term health problems, ongoing problems at work, or conflicts in your family. Long-term stress can harm your health.  How does stress affect your health?  When you are stressed, your body responds as though you are in danger. It makes hormones that speed up your heart, make you breathe faster, and give you a burst of energy. This is called the fight-or-flight stress response. If the stress is over quickly, your body goes back to normal and no harm is done.  But if stress happens too often or lasts too long, it can have bad effects. Long-term stress can make you more likely to get sick, and it can make symptoms of some diseases worse. If you tense up when you are stressed, you may develop neck, shoulder, or low back pain. Stress is linked to high blood pressure and heart disease.  Stress also harms your emotional health. It can make you marquez, tense, or depressed. Your relationships may suffer, and you may not do well at work or school.  What can you do to manage stress?  You can try these things to help manage stress:   Do something active. Exercise or activity can help reduce stress. Walking is a great way to get started. Even everyday activities such as housecleaning or yard work can help.  Try yoga or sharona chi. These techniques combine exercise and meditation. You may need some training at first to learn them.  Do something you enjoy. For example, listen to music or go to a movie. Practice your hobby or do volunteer work.  Meditate. This can help you relax, because you are not  "worrying about what happened before or what may happen in the future.  Do guided imagery. Imagine yourself in any setting that helps you feel calm. You can use online videos, books, or a teacher to guide you.  Do breathing exercises. For example:  From a standing position, bend forward from the waist with your knees slightly bent. Let your arms dangle close to the floor.  Breathe in slowly and deeply as you return to a standing position. Roll up slowly and lift your head last.  Hold your breath for just a few seconds in the standing position.  Breathe out slowly and bend forward from the waist.  Let your feelings out. Talk, laugh, cry, and express anger when you need to. Talking with supportive friends or family, a counselor, or a prabhjot leader about your feelings is a healthy way to relieve stress. Avoid discussing your feelings with people who make you feel worse.  Write. It may help to write about things that are bothering you. This helps you find out how much stress you feel and what is causing it. When you know this, you can find better ways to cope.  What can you do to prevent stress?  You might try some of these things to help prevent stress:  Manage your time. This helps you find time to do the things you want and need to do.  Get enough sleep. Your body recovers from the stresses of the day while you are sleeping.  Get support. Your family, friends, and community can make a difference in how you experience stress.  Limit your news feed. Avoid or limit time on social media or news that may make you feel stressed.  Do something active. Exercise or activity can help reduce stress. Walking is a great way to get started.  Where can you learn more?  Go to https://www.[employer].net/patiented  Enter N032 in the search box to learn more about \"Learning About Stress.\"  Current as of: October 24, 2024  Content Version: 14.4    7420-7242 [facility] [place].   Care instructions adapted under license by your " healthcare professional. If you have questions about a medical condition or this instruction, always ask your healthcare professional. [employer] disclaims any warranty or liability for your use of this information.    Learning About Sleeping Well  What does sleeping well mean?     Sleeping well means getting enough sleep to feel good and stay healthy. How much sleep is enough varies among people.  The number of hours you sleep and how you feel when you wake up are both important. If you do not feel refreshed, you probably need more sleep. Another sign of not getting enough sleep is feeling tired during the day.  Experts recommend that adults get at least 7 or more hours of sleep per day. Children and older adults need more sleep.  Why is getting enough sleep important?  Getting enough quality sleep is a basic part of good health. When your sleep suffers, your physical health, mood, and your thoughts can suffer too. You may find yourself feeling more grumpy or stressed. Not getting enough sleep also can lead to serious problems, including injury, accidents, anxiety, and depression.  What might cause poor sleeping?  Many things can cause sleep problems, including:  Changes to your sleep schedule.  Stress. Stress can be caused by fear about a single event, such as giving a speech. Or you may have ongoing stress, such as worry about work or school.  Depression, anxiety, and other mental or emotional conditions.  Changes in your sleep habits or surroundings. This includes changes that happen where you sleep, such as noise, light, or sleeping in a different bed. It also includes changes in your sleep pattern, such as having jet lag or working a late shift.  Health problems, such as pain, breathing problems, and restless legs syndrome.  Lack of regular exercise.  Using alcohol, nicotine, or caffeine before bed.  How can you help yourself?  Here are some tips that may help you sleep more soundly and wake up  "feeling more refreshed.  Your sleeping area   Use your bedroom only for sleeping and sex. A bit of light reading may help you fall asleep. But if it doesn't, do your reading elsewhere in the house. Try not to use your TV, computer, smartphone, or tablet while you are in bed.  Be sure your bed is big enough to stretch out comfortably, especially if you have a sleep partner.  Keep your bedroom quiet, dark, and cool. Use curtains, blinds, or a sleep mask to block out light. To block out noise, use earplugs, soothing music, or a \"white noise\" machine.  Your evening and bedtime routine   Create a relaxing bedtime routine. You might want to take a warm shower or bath, or listen to soothing music.  Go to bed at the same time every night. And get up at the same time every morning, even if you feel tired.  What to avoid   Limit caffeine (coffee, tea, caffeinated sodas) during the day, and don't have any for at least 6 hours before bedtime.  Avoid drinking alcohol before bedtime. Alcohol can cause you to wake up more often during the night.  Try not to smoke or use tobacco, especially in the evening. Nicotine can keep you awake.  Limit naps during the day, especially close to bedtime.  Avoid lying in bed awake for too long. If you can't fall asleep or if you wake up in the middle of the night and can't get back to sleep within about 20 minutes, get out of bed and go to another room until you feel sleepy.  Avoid taking medicine right before bed that may keep you awake or make you feel hyper or energized. Your doctor can tell you if your medicine may do this and if you can take it earlier in the day.  If you can't sleep   Imagine yourself in a peaceful, pleasant scene. Focus on the details and feelings of being in a place that is relaxing.  Get up and do a quiet or boring activity until you feel sleepy.  Avoid drinking any liquids before going to bed to help prevent waking up often to use the bathroom.  Where can you learn " "more?  Go to https://www.[employer].net/patiented  Enter J942 in the search box to learn more about \"Learning About Sleeping Well.\"  Current as of: July 31, 2024  Content Version: 14.4 2024-2025 [employer].   Care instructions adapted under license by your healthcare professional. If you have questions about a medical condition or this instruction, always ask your healthcare professional. [employer] disclaims any warranty or liability for your use of this information.       "

## 2025-04-30 ENCOUNTER — OFFICE VISIT (OUTPATIENT)
Dept: FAMILY MEDICINE | Facility: CLINIC | Age: 70
End: 2025-04-30
Payer: COMMERCIAL

## 2025-04-30 VITALS
SYSTOLIC BLOOD PRESSURE: 126 MMHG | WEIGHT: 238.4 LBS | DIASTOLIC BLOOD PRESSURE: 80 MMHG | HEART RATE: 67 BPM | BODY MASS INDEX: 37.42 KG/M2 | HEIGHT: 67 IN | TEMPERATURE: 97.8 F | RESPIRATION RATE: 16 BRPM | OXYGEN SATURATION: 100 %

## 2025-04-30 DIAGNOSIS — I10 ESSENTIAL HYPERTENSION, BENIGN: ICD-10-CM

## 2025-04-30 DIAGNOSIS — M81.0 OSTEOPOROSIS WITHOUT CURRENT PATHOLOGICAL FRACTURE, UNSPECIFIED OSTEOPOROSIS TYPE: ICD-10-CM

## 2025-04-30 DIAGNOSIS — F32.0 MAJOR DEPRESSIVE DISORDER, SINGLE EPISODE, MILD: ICD-10-CM

## 2025-04-30 DIAGNOSIS — F41.1 GENERALIZED ANXIETY DISORDER: ICD-10-CM

## 2025-04-30 DIAGNOSIS — Z12.31 ENCOUNTER FOR SCREENING MAMMOGRAM FOR BREAST CANCER: ICD-10-CM

## 2025-04-30 DIAGNOSIS — Z00.00 ENCOUNTER FOR MEDICARE ANNUAL WELLNESS EXAM: Primary | ICD-10-CM

## 2025-04-30 DIAGNOSIS — Z13.6 CARDIOVASCULAR SCREENING; LDL GOAL LESS THAN 130: ICD-10-CM

## 2025-04-30 LAB
ANION GAP SERPL CALCULATED.3IONS-SCNC: 9 MMOL/L (ref 7–15)
BUN SERPL-MCNC: 26.1 MG/DL (ref 8–23)
CALCIUM SERPL-MCNC: 9.5 MG/DL (ref 8.8–10.4)
CHLORIDE SERPL-SCNC: 102 MMOL/L (ref 98–107)
CHOLEST SERPL-MCNC: 184 MG/DL
CREAT SERPL-MCNC: 1.11 MG/DL (ref 0.51–0.95)
EGFRCR SERPLBLD CKD-EPI 2021: 54 ML/MIN/1.73M2
FASTING STATUS PATIENT QL REPORTED: YES
FASTING STATUS PATIENT QL REPORTED: YES
GLUCOSE SERPL-MCNC: 98 MG/DL (ref 70–99)
HCO3 SERPL-SCNC: 27 MMOL/L (ref 22–29)
HDLC SERPL-MCNC: 68 MG/DL
LDLC SERPL CALC-MCNC: 98 MG/DL
NONHDLC SERPL-MCNC: 116 MG/DL
POTASSIUM SERPL-SCNC: 4.7 MMOL/L (ref 3.4–5.3)
SODIUM SERPL-SCNC: 138 MMOL/L (ref 135–145)
TRIGL SERPL-MCNC: 88 MG/DL

## 2025-04-30 PROCEDURE — 36415 COLL VENOUS BLD VENIPUNCTURE: CPT | Performed by: NURSE PRACTITIONER

## 2025-04-30 PROCEDURE — 80048 BASIC METABOLIC PNL TOTAL CA: CPT | Performed by: NURSE PRACTITIONER

## 2025-04-30 PROCEDURE — G0439 PPPS, SUBSEQ VISIT: HCPCS | Performed by: NURSE PRACTITIONER

## 2025-04-30 PROCEDURE — G2211 COMPLEX E/M VISIT ADD ON: HCPCS | Performed by: NURSE PRACTITIONER

## 2025-04-30 PROCEDURE — 99214 OFFICE O/P EST MOD 30 MIN: CPT | Mod: 25 | Performed by: NURSE PRACTITIONER

## 2025-04-30 PROCEDURE — 3079F DIAST BP 80-89 MM HG: CPT | Performed by: NURSE PRACTITIONER

## 2025-04-30 PROCEDURE — 80061 LIPID PANEL: CPT | Performed by: NURSE PRACTITIONER

## 2025-04-30 PROCEDURE — 3074F SYST BP LT 130 MM HG: CPT | Performed by: NURSE PRACTITIONER

## 2025-04-30 PROCEDURE — 1126F AMNT PAIN NOTED NONE PRSNT: CPT | Performed by: NURSE PRACTITIONER

## 2025-04-30 RX ORDER — METOPROLOL SUCCINATE 25 MG/1
25 TABLET, EXTENDED RELEASE ORAL DAILY
Qty: 90 TABLET | Refills: 3 | Status: SHIPPED | OUTPATIENT
Start: 2025-04-30

## 2025-04-30 RX ORDER — BUPROPION HYDROCHLORIDE 300 MG/1
300 TABLET ORAL EVERY MORNING
Qty: 90 TABLET | Refills: 3 | Status: SHIPPED | OUTPATIENT
Start: 2025-04-30

## 2025-04-30 RX ORDER — ESCITALOPRAM OXALATE 20 MG/1
20 TABLET ORAL DAILY
Qty: 90 TABLET | Refills: 3 | Status: SHIPPED | OUTPATIENT
Start: 2025-04-30

## 2025-04-30 RX ORDER — AMLODIPINE BESYLATE 5 MG/1
7.5 TABLET ORAL DAILY
Qty: 135 TABLET | Refills: 3 | Status: SHIPPED | OUTPATIENT
Start: 2025-04-30

## 2025-04-30 RX ORDER — ALENDRONATE SODIUM 70 MG/1
70 TABLET ORAL
Qty: 12 TABLET | Refills: 3 | Status: SHIPPED | OUTPATIENT
Start: 2025-04-30

## 2025-04-30 ASSESSMENT — PAIN SCALES - GENERAL: PAINLEVEL_OUTOF10: NO PAIN (0)

## 2025-04-30 ASSESSMENT — PATIENT HEALTH QUESTIONNAIRE - PHQ9
SUM OF ALL RESPONSES TO PHQ QUESTIONS 1-9: 9
SUM OF ALL RESPONSES TO PHQ QUESTIONS 1-9: 9
10. IF YOU CHECKED OFF ANY PROBLEMS, HOW DIFFICULT HAVE THESE PROBLEMS MADE IT FOR YOU TO DO YOUR WORK, TAKE CARE OF THINGS AT HOME, OR GET ALONG WITH OTHER PEOPLE: SOMEWHAT DIFFICULT

## 2025-04-30 NOTE — PROGRESS NOTES
Preventive Care Visit  [facility]  Annika Olivo, FELIPE, Family Medicine  Apr 30, 2025      Assessment & Plan     Encounter for Medicare annual wellness exam  Very pleasant 69-year-old female in for annual Medicare wellness examination.    Essential hypertension, benign  Chronic, stable.  Blood pressure below target goal in office today.  Will check metabolic panel and continue medications without any changes.  - Basic metabolic panel  (Ca, Cl, CO2, Creat, Gluc, K, Na, BUN); Future  - amLODIPine (NORVASC) 5 MG tablet; Take 1.5 tablets (7.5 mg) by mouth daily.  - metoprolol succinate ER (TOPROL XL) 25 MG 24 hr tablet; Take 1 tablet (25 mg) by mouth daily.  - Basic metabolic panel  (Ca, Cl, CO2, Creat, Gluc, K, Na, BUN)    Osteoporosis without current pathological fracture, unspecified osteoporosis type  Chronic, started Fosamax in May of last year.  Tolerating well.  Will continue for minimum of 1 more year and recheck DEXA scan.2  - alendronate (FOSAMAX) 70 MG tablet; Take 1 tablet (70 mg) by mouth every 7 days.    Major depressive disorder, single episode, mild  Generalized anxiety disorder  Chronic, doing very well on current regimen.  Will continue without any changes.  - buPROPion (WELLBUTRIN XL) 300 MG 24 hr tablet; Take 1 tablet (300 mg) by mouth every morning.  - escitalopram (LEXAPRO) 20 MG tablet; Take 1 tablet (20 mg) by mouth daily.    Encounter for screening mammogram for breast cancer  Orders placed.  - MA Screen Bilateral w/[place]; Future    CARDIOVASCULAR SCREENING; LDL GOAL LESS THAN 130  Patient is fasting today, will check cholesterol levels  - Lipid Profile (Chol, Trig, HDL, LDL calc); Future  - Lipid Profile (Chol, Trig, HDL, LDL calc)      The longitudinal plan of care for the diagnosis(es)/condition(s) as documented were addressed during this visit. Due to the added complexity in care, I will continue to support Danika in the subsequent management and with ongoing  continuity of care.      Patient has been advised of split billing requirements and indicates understanding: Yes        Counseling  Appropriate preventive services were addressed with this patient via screening, questionnaire, or discussion as appropriate for fall prevention, nutrition, physical activity, Tobacco-use cessation, social engagement, weight loss and cognition.  Checklist reviewing preventive services available has been given to the patient.  Reviewed patient's diet, addressing concerns and/or questions.   She is at risk for lack of exercise and has been provided with information to increase physical activity for the benefit of her well-being.   She is at risk for psychosocial distress and has been provided with information to reduce risk.   Discussed possible causes of fatigue. The patient was provided with written information regarding signs of hearing loss.   The patient's PHQ-9 score is consistent with mild depression. She was provided with information regarding depression.       Follow-up    Follow-up Visit   Expected date:  May 06, 2026 (Approximate)      Follow Up Appointment Details:     Follow-up with whom?: PCP    Follow-Up for what?: Medicare Wellness    Welcome or Annual?: Annual Wellness    How?: In Person                 Subjective   Danika is a 69 year old, presenting for the following:  Physical        2/5/2025    10:12 AM   Additional Questions   Roomed by Marina ONTIVEROS([facility])           HPI       Doing better with moods  Working with her therapists still     Isn't taking Zepbound due to insurance coverage   Is going to try a mindful disc to help her with her appetite     Advance Care Planning    Discussed advance care planning with patient; informed AVS has link to [employer].        4/30/2025   General Health   How would you rate your overall physical health? (!) FAIR   Feel stress (tense, anxious, or unable to sleep) Rather much   (!) STRESS CONCERN      4/30/2025   Nutrition   Diet:  Carbohydrate counting         4/30/2025   Exercise   Days per week of moderate/strenous exercise 1 day   Average minutes spent exercising at this level 10 min   (!) EXERCISE CONCERN      4/30/2025   Social Factors   Frequency of gathering with friends or relatives Twice a week   Worry food won't last until get money to buy more No   Food not last or not have enough money for food? No   Do you have housing? (Housing is defined as stable permanent housing and does not include staying outside in a car, in a tent, in an abandoned building, in an overnight shelter, or couch-surfing.) Yes   Are you worried about losing your housing? No   Lack of transportation? No   Unable to get utilities (heat,electricity)? No         4/30/2025   Fall Risk   Gait Speed Test Interpretation Less than or equal to 5.00 seconds - PASS           4/30/2025   Activities of Daily Living- Home Safety   Needs help with the following daily activites None of the above   Safety concerns in the home None of the above         4/30/2025   Dental   Dentist two times every year? Yes         4/30/2025   Hearing Screening   Hearing concerns? (!) I NEED TO ASK PEOPLE TO SPEAK UP OR REPEAT THEMSELVES.    (!) IT'S HARD TO FOLLOW A CONVERSATION IN A NOISY RESTAURANT OR CROWDED ROOM.    (!) TROUBLE UNDERSTANDING SOFT OR WHISPERED SPEECH.       Multiple values from one day are sorted in reverse-chronological order         4/30/2025   Driving Risk Screening   Patient/family members have concerns about driving No         4/30/2025   General Alertness/Fatigue Screening   Have you been more tired than usual lately? (!) YES         4/30/2025   Urinary Incontinence Screening   Bothered by leaking urine in past 6 months No       Today's PHQ-9 Score:       4/30/2025    10:55 AM   PHQ-9 SCORE   PHQ-9 Total Score MyChart 9 (Mild depression)   PHQ-9 Total Score 9        Patient-reported         4/30/2025   Substance Use   Alcohol more than 3/day or more than 7/wk No   Do  you have a current opioid prescription? No   How severe/bad is pain from 1 to 10? 3/10   Do you use any other substances recreationally? No     Social History     Tobacco Use    Smoking status: Never    Smokeless tobacco: Never   Vaping Use    Vaping status: Never Used   Substance Use Topics    Alcohol use: No    Drug use: No           5/17/2023   LAST FHS-7 RESULTS   1st degree relative breast or ovarian cancer No   Any relative bilateral breast cancer No   Any male have breast cancer No   Any ONE woman have BOTH breast AND ovarian cancer No   Any woman with breast cancer before 50yrs No   2 or more relatives with breast AND/OR ovarian cancer No   2 or more relatives with breast AND/OR bowel cancer No        Mammogram Screening - Mammogram every 1-2 years updated in Health Maintenance based on mutual decision making      History of abnormal Pap smear: No - age 65 or older with adequate negative prior screening test results (3 consecutive negative cytology results, 2 consecutive negative cotesting results, or 2 consecutive negative HrHPV test results within 10 years, with the most recent test occurring within the recommended screening interval for the test used)        Latest Ref Rng & Units 4/3/2019    10:22 AM 4/3/2019     9:50 AM 3/28/2016    11:00 AM   PAP / HPV   PAP (Historical)  NIL      HPV 16 DNA NEG^Negative  Negative  Negative    HPV 18 DNA NEG^Negative  Negative  Negative    Other HR HPV NEG^Negative  Negative  Negative      ASCVD Risk   The ASCVD Risk score (Bert ROGEL, et al., 2019) failed to calculate for the following reasons:    Risk score cannot be calculated because patient has a medical history suggesting prior/existing ASCVD          Reviewed and updated as needed this visit by Provider                    Past Medical History:   Diagnosis Date    Arthritis     Cerebral infarction (H)     Depressive disorder     Depressive disorder, not elsewhere classified     Generalized anxiety  disorder     Hypertension     No cardiologist    Meniere's disease 2005     Problem list name updated by automated process. Provider to review    Meniere's disease, unspecified     Sensorineural hearing loss, unspecified     left ear since birth complete    Unspecified episodic mood disorder     depression worsens winter SADD     Past Surgical History:   Procedure Laterality Date    ADENOIDECTOMY  1967    ARTHROPLASTY KNEE  2013    Procedure: ARTHROPLASTY KNEE;  Right Total Knee Arthroplasty  ;  Surgeon: Ganesh Sheikh MD;  Location: [facility]    ARTHROPLASTY KNEE UNICOMPARTMENT Left 2016    Procedure: ARTHROPLASTY KNEE UNICOMPARTMENT;  Surgeon: Ganesh Sheikh MD;  Location:  OR    BIOPSY      Colonoscopy    C ANESTH, SECTION      x3    COLONOSCOPY N/A 2023    Procedure: COLONOSCOPY, WITH POLYPECTOMY AND BIOPSY;  Surgeon: Kraig Vides DO;  Location: [facility]    ESOPHAGOSCOPY, GASTROSCOPY, DUODENOSCOPY (EGD), COMBINED N/A 2023    Procedure: ESOPHAGOGASTRODUODENOSCOPY, WITH BIOPSY;  Surgeon: Kraig Vides DO;  Location: [facility]    IR LYMPH NODE BIOPSY  10/14/2021    LAPAROSCOPIC CHOLECYSTECTOMY WITH CHOLANGIOGRAMS N/A 2018    Procedure: LAPAROSCOPIC CHOLECYSTECTOMY WITH CHOLANGIOGRAMS;  Laparoscopic Cholecystectomy with Intraoperative Cholangiogram;  Surgeon: Erick Rivera MD;  Location: [facility]    TONSILLECTOMY       OB History    Para Term  AB Living   3 3 0 0 0 3   SAB IAB Ectopic Multiple Live Births   0 0 0 0 0      # Outcome Date GA Lbr Domenic/2nd Weight Sex Type Anes PTL Lv   3 Para            2 Para            1 Para               Obstetric Comments   3 C-sections     Current providers sharing in care for this patient include:  Patient Care Team:  Annika Whitmore DNP as PCP - General (Nurse Practitioner - Family)  Annika Whitmore DNP as Assigned PCP    The following health maintenance items are  "reviewed in Epic and correct as of today:  Health Maintenance   Topic Date Due    Pneumococcal Vaccine: 50+ Years (1 of 1 - PCV) Never done    DTAP/TDAP/TD IMMUNIZATION (2 - Td or Tdap) 10/06/2021    INFLUENZA VACCINE (1) 09/01/2024    COVID-19 Vaccine (4 - 2024-25 season) 09/01/2024    BMP  04/26/2025    MAMMO SCREENING  05/17/2025    DEPRESSION 12 MO INDEX REPEAT PHQ-9  05/14/2025    PHQ-9  10/30/2025    ANNUAL REVIEW OF HM ORDERS  02/05/2026    MEDICARE ANNUAL WELLNESS VISIT  04/30/2026    FALL RISK ASSESSMENT  04/30/2026    DIABETES SCREENING  04/26/2027    DEXA  05/15/2027    LIPID  04/26/2029    ADVANCE CARE PLANNING  04/30/2030    RSV VACCINE (1 - 1-dose 75+ series) 12/19/2030    COLORECTAL CANCER SCREENING  03/01/2033    HEPATITIS C SCREENING  Completed    DEPRESSION ACTION PLAN  Completed    ZOSTER IMMUNIZATION  Completed    HPV IMMUNIZATION  Aged Out    MENINGITIS IMMUNIZATION  Aged Out    PAP  Discontinued         Review of Systems  Constitutional, neuro, ENT, endocrine, pulmonary, cardiac, gastrointestinal, genitourinary, musculoskeletal, integument and psychiatric systems are negative, except as otherwise noted.     Objective    Exam  /80   Pulse 67   Temp 97.8  F (36.6  C) (Tympanic)   Resp 16   Ht 1.71 m (5' 7.32\")   Wt 108.1 kg (238 lb 6.4 oz)   LMP 07/01/2002   SpO2 100%   BMI 36.98 kg/m     Estimated body mass index is 36.98 kg/m  as calculated from the following:    Height as of this encounter: 1.71 m (5' 7.32\").    Weight as of this encounter: 108.1 kg (238 lb 6.4 oz).    Physical Exam  GENERAL: alert and no distress  EYES: Eyes grossly normal to inspection, PERRL and conjunctivae and sclerae normal  HENT: ear canals and TM's normal, nose and mouth without ulcers or lesions  NECK: no adenopathy, no asymmetry, masses, or scars  RESP: lungs clear to auscultation - no rales, rhonchi or wheezes  BREAST: deferred   CV: regular rate and rhythm, normal S1 S2, no S3 or S4, no murmur, click " or rub, no peripheral edema  ABDOMEN: soft, nontender, no hepatosplenomegaly, no masses and bowel sounds normal  MS: no gross musculoskeletal defects noted, no edema  SKIN: no suspicious lesions or rashes  NEURO: Normal strength and tone, mentation intact and speech normal  PSYCH: mentation appears normal, affect normal/bright         4/30/2025   Mini Cog   Clock Draw Score 2 Normal   3 Item Recall 3 objects recalled   Mini Cog Total Score 5              Signed Electronically by: Annika Olivo DNP    Answers submitted by the patient for this visit:  Patient Health Questionnaire (Submitted on 4/30/2025)  If you checked off any problems, how difficult have these problems made it for you to do your work, take care of things at home, or get along with other people?: Somewhat difficult  PHQ9 TOTAL SCORE: 9    Chart documentation with Dragon Voice recognition Software. Although reviewed after completion, some words and grammatical errors may remain.

## 2025-04-30 NOTE — NURSING NOTE
"Chief Complaint   Patient presents with    Physical       Initial LMP 07/01/2002  Estimated body mass index is 38.01 kg/m  as calculated from the following:    Height as of 2/5/25: 1.7 m (5' 6.93\").    Weight as of 2/5/25: 109.9 kg (242 lb 3.2 oz).    Patient presents to the clinic using No DME    Is there anyone who you would like to be able to receive your results? No  If yes have patient fill out DARELL      "

## 2025-05-16 ENCOUNTER — HOSPITAL ENCOUNTER (OUTPATIENT)
Dept: MAMMOGRAPHY | Facility: CLINIC | Age: 70
Discharge: HOME | End: 2025-05-16
Attending: NURSE PRACTITIONER | Admitting: NURSE PRACTITIONER
Payer: COMMERCIAL

## 2025-05-16 DIAGNOSIS — Z12.31 ENCOUNTER FOR SCREENING MAMMOGRAM FOR BREAST CANCER: ICD-10-CM

## 2025-05-16 PROCEDURE — 77063 BREAST TOMOSYNTHESIS BI: CPT

## 2025-09-01 DIAGNOSIS — L71.0 PERIORAL DERMATITIS: ICD-10-CM

## 2025-09-02 RX ORDER — MUPIROCIN 2 %
OINTMENT (GRAM) TOPICAL
Qty: 22 G | Refills: 0 | Status: SHIPPED | OUTPATIENT
Start: 2025-09-02

## (undated) DEVICE — TUBING IV EXTENSION SET 34"

## (undated) DEVICE — DRAPE C-ARM 60X42" 1013

## (undated) DEVICE — SUCTION IRRIGATION STRYKFLOW II W/TIP DISP 250-070-520

## (undated) DEVICE — SU MONOCRYL 4-0 PS-2 18" UND Y496G

## (undated) DEVICE — ANTIFOG SOLUTION W/FOAM PAD 31142527

## (undated) DEVICE — ENDO CANNULA 05MM VERSAONE UNIVERSAL UNVCA5STF

## (undated) DEVICE — SPONGE RAY-TEC 4X8" 7318

## (undated) DEVICE — ENDO TROCAR OPTICAL 05MM VERSAPORT PLUS W/FIX CAN ONB5STF

## (undated) DEVICE — SU SECURESEAL SKIN ADHESIVE 0.5ML CHSS-05

## (undated) DEVICE — CATH CHOLANGIOGRAM 4.5FR TAUT METAL TIP 20018-M55

## (undated) DEVICE — ESU PENCIL W/COATED BLADE E2450H

## (undated) DEVICE — GLOVE PROTEXIS BLUE W/NEU-THERA 7.5  2D73EB75

## (undated) DEVICE — ENDO FORCEP ENDOJAW BIOPSY 2.8MMX230CM FB-220U

## (undated) DEVICE — ENDO POUCH GOLD 10MM ECATCH 173050G

## (undated) DEVICE — SU VICRYL 0 TIE 54" UND J287G

## (undated) DEVICE — Device

## (undated) DEVICE — SYR 20ML LL W/O NDL

## (undated) DEVICE — GLOVE PROTEXIS W/NEU-THERA 7.5  2D73TE75

## (undated) DEVICE — DEVICE SUTURE GRASPER TROCAR CLOSURE 14GA PMITCSG

## (undated) DEVICE — ENDO TROCAR BALLOON TIP 10MM OMS-T10BT

## (undated) DEVICE — ENDO TROCAR BLUNT 100MM W/THRD ANCHOR BLUNTPORT BPT12STS

## (undated) RX ORDER — NEOSTIGMINE METHYLSULFATE 1 MG/ML
VIAL (ML) INJECTION
Status: DISPENSED
Start: 2018-05-11

## (undated) RX ORDER — ACETAMINOPHEN 325 MG/1
TABLET ORAL
Status: DISPENSED
Start: 2018-05-11

## (undated) RX ORDER — KETOROLAC TROMETHAMINE 30 MG/ML
INJECTION, SOLUTION INTRAMUSCULAR; INTRAVENOUS
Status: DISPENSED
Start: 2018-05-11

## (undated) RX ORDER — GLYCOPYRROLATE 0.2 MG/ML
INJECTION, SOLUTION INTRAMUSCULAR; INTRAVENOUS
Status: DISPENSED
Start: 2018-05-11

## (undated) RX ORDER — SODIUM CHLORIDE 9 MG/ML
INJECTION, SOLUTION INTRAVENOUS
Status: DISPENSED
Start: 2021-10-14

## (undated) RX ORDER — DEXAMETHASONE SODIUM PHOSPHATE 4 MG/ML
INJECTION, SOLUTION INTRA-ARTICULAR; INTRALESIONAL; INTRAMUSCULAR; INTRAVENOUS; SOFT TISSUE
Status: DISPENSED
Start: 2018-05-11

## (undated) RX ORDER — LIDOCAINE HYDROCHLORIDE 10 MG/ML
INJECTION, SOLUTION EPIDURAL; INFILTRATION; INTRACAUDAL; PERINEURAL
Status: DISPENSED
Start: 2021-10-14

## (undated) RX ORDER — FENTANYL CITRATE 50 UG/ML
INJECTION, SOLUTION INTRAMUSCULAR; INTRAVENOUS
Status: DISPENSED
Start: 2018-05-11

## (undated) RX ORDER — BUPIVACAINE HYDROCHLORIDE AND EPINEPHRINE 5; 5 MG/ML; UG/ML
INJECTION, SOLUTION EPIDURAL; INTRACAUDAL; PERINEURAL
Status: DISPENSED
Start: 2018-05-11

## (undated) RX ORDER — ONDANSETRON 2 MG/ML
INJECTION INTRAMUSCULAR; INTRAVENOUS
Status: DISPENSED
Start: 2018-05-11

## (undated) RX ORDER — CLINDAMYCIN PHOSPHATE 900 MG/50ML
INJECTION, SOLUTION INTRAVENOUS
Status: DISPENSED
Start: 2018-05-11

## (undated) RX ORDER — GABAPENTIN 300 MG/1
CAPSULE ORAL
Status: DISPENSED
Start: 2018-05-11

## (undated) RX ORDER — PROPOFOL 10 MG/ML
INJECTION, EMULSION INTRAVENOUS
Status: DISPENSED
Start: 2018-05-11

## (undated) RX ORDER — EPHEDRINE SULFATE 50 MG/ML
INJECTION, SOLUTION INTRAVENOUS
Status: DISPENSED
Start: 2018-05-11